# Patient Record
Sex: FEMALE | Race: WHITE | URBAN - METROPOLITAN AREA
[De-identification: names, ages, dates, MRNs, and addresses within clinical notes are randomized per-mention and may not be internally consistent; named-entity substitution may affect disease eponyms.]

---

## 2017-05-03 ENCOUNTER — TRANSFERRED RECORDS (OUTPATIENT)
Dept: HEALTH INFORMATION MANAGEMENT | Facility: CLINIC | Age: 72
End: 2017-05-03

## 2017-05-24 ENCOUNTER — HOSPITAL ENCOUNTER (OUTPATIENT)
Dept: BEHAVIORAL HEALTH | Facility: CLINIC | Age: 72
Discharge: HOME | End: 2017-05-24
Attending: PSYCHIATRY & NEUROLOGY | Admitting: PSYCHIATRY & NEUROLOGY
Payer: MEDICARE

## 2017-05-24 PROCEDURE — 90791 PSYCH DIAGNOSTIC EVALUATION: CPT

## 2017-05-24 ASSESSMENT — PAIN SCALES - GENERAL: PAINLEVEL: NO PAIN (0)

## 2017-05-24 NOTE — MR AVS SNAPSHOT
"                  MRN:[id number]                      After Visit Summary   2017    Katerina Crump    MRN: [id number]           Visit Information        Provider Department      2017  9:00 AM Tiffani Ayon LICSW [facility]        Your next 10 appointments already scheduled     May 24, 2017  9:00 AM CDT   Evaluation with SAHARA Terry   [facility] ([facility])    [address] [place] [id number]   [phone]              MyChart Information     [employer] lets you send messages to your doctor, view your test results, renew your prescriptions, schedule appointments and more. To sign up, go to [email or web address] . Click on \"Log in\" on the left side of the screen, which will take you to the Welcome page. Then click on \"Sign up Now\" on the right side of the page.     You will be asked to enter the access code listed below, as well as some personal information. Please follow the directions to create your username and password.     Your access code is: [email or web address]  Expires: 2017  8:20 AM     Your access code will  in 90 days. If you need help or a new code, please call your [place] clinic or [phone].        Care EveryWhere ID     This is your Care EveryWhere ID. This could be used by other organizations to access your [place] medical records  [id number]        "

## 2017-05-24 NOTE — MR AVS SNAPSHOT
Medication List       Patient:  CÉSAR STEEN   :  1945   Physician:  Rommel Coleman MD           This is your record.  Keep this with you and show to your community pharmacist(s) and physician(s) at each visit.     Allergies:  KEFLEX [CEPHALEXIN] - (reactions not documented)               Medications  Valid as of: May 24, 2017 -  8:20 AM    Generic Name Brand Name Tablet Size Instructions for use    Calcium OS- mg Ruby. Ca 500 MG Take 500 mg by mouth 2 times daily as needed         ClonazePAM klonoPIN 0.5 MG Take 0.5 tablets (0.25 mg) by mouth daily as needed for anxiety        Divalproex Sodium DEPAKOTE  MG Take 3 tablets (750 mg) by mouth At Bedtime        Gabapentin NEURONTIN 300 MG Take 2 capsules (600 mg) by mouth 2 times daily        Multiple Vitamin THERA-VIT  Take 1 tablet by mouth daily        OLANZapine zyPREXA 5 MG Take 1 tablet (5 mg) by mouth At Bedtime        .           .           .           .
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
## 2017-05-26 ENCOUNTER — HOSPITAL ENCOUNTER (OUTPATIENT)
Dept: BEHAVIORAL HEALTH | Facility: CLINIC | Age: 72
End: 2017-05-26
Attending: NURSE PRACTITIONER
Payer: MEDICARE

## 2017-05-26 PROBLEM — F31.9 BIPOLAR DISORDER (H): Status: ACTIVE | Noted: 2017-05-26

## 2017-05-26 PROCEDURE — H2012 BEHAV HLTH DAY TREAT, PER HR: HCPCS

## 2017-05-26 NOTE — PROGRESS NOTES
Group Therapy Progress Notes     Area of Treatment Focus:  Symptom Management, Personal Safety, Community Resources/Discharge Planning, Abstinence/Relapse Prevention, Develop / Improve Independent Living Skills and Develop Socialization / Interpersonal Relationship Skills    Therapeutic Interventions/Treatment Strategies:  Support, Feedback, Safety Assessments, Structured Activity, Problem Solving and Education    Response to Treatment Strategies:  Accepted Feedback, Gave Feedback, Listened, Focused on Goals, Attentive and Accepted Support    Name of Group:  Psychotherapy, Wellness    Progress Note  Psychotherapy:  Session 1: Today was Katerina's first day back at the 55+ program. She shared that she had been here before and it had been quite helpful. Now she finds herself immersed in anxiety again is struggling on daily basis. She fit in well with the group and was quick to begin sharing and participating. She is hopeful that this will again be helpful for her.  Session 2: The group addressed the theme of relapse. As several group members had been in the program before, it seemed to be important for all to explore what might be the signals or behavior changes to watch for that may indicate a potential relapse.  Wellness: The client actively participated in an exercise in sharing personal strengths. The clients followed the handout and directed questions at each other. The group indicated that they felt that this was helpful in getting to know each other and also recognize positive qualities in themselves.           Is this a Weekly Review of the Progress on the Treatment Plan?  Yes.      Are Treatment Plan Goals being addressed?  Yes, continue treatment goals      Are Treatment Plan Strategies to Address Goals Effective?  Yes, continue treatment strategies      Are there any current contracts in place?  Yes. safety

## 2017-05-26 NOTE — PROGRESS NOTES
Group Therapy Progress Notes     Area of Treatment Focus:  Symptom Management, Personal Safety, Community Resources/Discharge Planning, Abstinence/Relapse Prevention, Develop / Improve Independent Living Skills and Develop Socialization / Interpersonal Relationship Skills    Therapeutic Interventions/Treatment Strategies:  Support, Feedback, Safety Assessments, Structured Activity, Problem Solving and Education    Response to Treatment Strategies:  Accepted Feedback, Gave Feedback, Listened, Focused on Goals, Attentive and Accepted Support    Name of Group:  Mental Health Management.    Progress Note  Topic: Self Compassion for depression. Introduced topic, reviewed how to use skill, spent last 15 minutes practicing mindfulness with self compassion as focus. Katerina identified she can be very critical of herself especially when she is depressed and anxious. She engaged in session but was rather quiet, did not offer much feedback. Appeared anxious but engaged with peers.         Is this a Weekly Review of the Progress on the Treatment Plan?  Yes.      Are Treatment Plan Goals being addressed?  Yes, continue treatment goals      Are Treatment Plan Strategies to Address Goals Effective?  Yes, continue treatment strategies      Are there any current contracts in place?  Yes. safety

## 2017-05-30 ENCOUNTER — HOSPITAL ENCOUNTER (OUTPATIENT)
Dept: BEHAVIORAL HEALTH | Facility: CLINIC | Age: 72
End: 2017-05-30
Attending: NURSE PRACTITIONER
Payer: MEDICARE

## 2017-05-30 PROCEDURE — H2012 BEHAV HLTH DAY TREAT, PER HR: HCPCS

## 2017-05-30 PROCEDURE — 99213 OFFICE O/P EST LOW 20 MIN: CPT | Performed by: NURSE PRACTITIONER

## 2017-05-30 NOTE — PROGRESS NOTES
"RN Review of Medical History / Physical Health Screen  Outpatient Behavioral Programs      CLIENT'S NAME: Katerina Crump  MRN:   [id number]  :   1945 AGE:71 year old SEX: female    DATE OF DIAGNOSTIC ASSESSMENT: 17  DATE OF ADMISSION: 17   PROGRAM: 55+ Outpatient Program (55+)     Following admission, the RN reviewed the following:    - Medical History / Physical Health Screen completed during the DA noted above.  - Immediate Health Concerns as noted on the Initial Individual Treatment Plan.    Client height and weight recorded by RN in epic: yes    BMI Review:  Was the patient informed of BMI? yes      Findings No intervention     RN Recommendations include: Has had 8# wt loss over past two months due to \"heartburn\" she has appointment with PMD this week. Advised to follow up with primary care physician for any recommendations.  General nutrition education will be provided in group setting.    ALECIA ALVAREZ  2017    "

## 2017-05-30 NOTE — PROGRESS NOTES
"Group Therapy Progress Notes     Area of Treatment Focus:  Symptom Management and Develop / Improve Independent Living Skills    Therapeutic Interventions/Treatment Strategies:  Support, Feedback, Clarification and Education    Response to Treatment Strategies:  Accepted Feedback, Gave Feedback, Listened, Focused on Goals, Attentive, Accepted Support and Alert    Name of Group:  Psychotherapy     Progress Note  In Psychotherapy Group 1: Katerina reports that she is feeling anxious, is having difficulty making decisions, can't call people, is isolating, has negative thoughts about her life right now, needs a lot of reassurance from spouse and family, and has a hard time knowing what to do with her time. She is uncomfortable being alone. \"I don't feel like myself right now.\" She does walk and attends a support group for people with anxiety. She complains of \"shaking\" and thinks it may be a side effect of medication. She has been in touch with her medication provider and will see her on June 7. Katerina is seeing a therapist in the community but is wondering if she should change providers as she feels therapist is not supportive. Looked at ways to communicate with therapist about Katerina's needs and also ways to get referrals if Katerina decides to pursue a change.    In Psychotherapy Group 2: Katerina participated in a group discussing the theme from Group 1 of not feeling like one's self. Katerina says she loses her self confidence and self-esteem. She loses her sense of humor and ability to be around others. She has been volunteering but now will let that go for awhile. Looked at ways to communicate about depression with others. Katerina says that no one in her social [place], including spouse, understands depression. She feels relieved to be here in the group.            Is this a Weekly Review of the Progress on the Treatment Plan?  No     "

## 2017-05-31 NOTE — PROGRESS NOTES
"  Adult Mental Health Outpatient Group Therapy Progress Note     Katerina verbalizes the following treatment/discharge goals: \"To decrease the fear of each day. Increase my self confidence again. To be able to face the day. Decrease anxiety symptoms\".     Area of Treatment Focus:  Symptom Management and Develop Socialization / Interpersonal Relationship Skills    Therapeutic Interventions/Treatment Strategies:  Support, Feedback, Structured Activity and Education    Response to Treatment Strategies:  Accepted Feedback, Gave Feedback, Listened, Focused on Goals, Attentive, Accepted Support and Alert    Name of Group:  Mental health management     Description and Outcome:  Group checked in identifying sense of losing pieces of self with mental illness, notably for some their sense of humor.  Light music and beach balls were brought into group as a way of initiating sense of lightness and play.  Scarves were also introduced and \"played with\" to further explore lightness.  Group members identified increased energy and were able to acknowledge that they still maintained an ability to play, be creative and spontaneous.  Katerina was engaged in spontaneous movement development and used humor to interact with others.    Is this a Weekly Review of the Progress on the Treatment Plan?  No        "

## 2017-05-31 NOTE — H&P
" DATE OF SERVICE: 5/31/2017                                             ATTENDING PROVIDER: Celso GURROLA DNP  LEGAL STATUS:  Voluntary  SOURCES OF INFORMATION: Information was obtained from the patient and available records.  REASON FOR ASSESSMENT: Continuation of Senior Outpatient Program   HISTORY F PRESENT ILLNESS: Katerina Crump is a 71 year old female referred to the 55 Plus Program by herself. This is her second time in the program and she finds it quiet helpful. Mrs. Crump has a history of bipolar disorder, however she is disputing this diagnosis saying that she has never been manic or hypomanic, she is usually depressed and anxious. States she was first diagnosed with depression at age 16, after one of her friends past away. She has been hospitalized twice, once in the 70's and once in 2015 at [facility]. She has never attempted suicide and has not had ECT. Her PCP is Dr. Coleman and her psychiatrist is Dr. Oneal whom she saw in April and will see again July 3rd.   Current episode of depression and anxiety started in the spring of 2015 after a sergery to remove squamous cell carcinoma. She became very depressed, worried, unable to sleep, relax, eat or function in any way. She was contemplating suicide. She was hospitalized for 3 weeks at [facility] under Dr. Pereyra. She was discharged on regiment of Depakote, Klonopin Zyprexa and Vistaril. She was relatively stable until March of this year. She was scheduled to have an injection in March for osteoarthritis but does not feel that this has been stressful for her. She can't identify any stressors that may have contributed to her current mental states. Current symptoms include: negative thinking/image, high anxiety, poor sleep and appetite (lost 8 lbs this month), shakiness, racing thoughts, restlessness, fear of being alone, depression, and inability to function. She denies SI, SIB. She is avoiding her family and friends \"I don't want to be " "seen this way\". Her psychiatrist recently increased Depakote to a 1000 mg a day but she is not feeling much better.  Denies panic attacks, hallucinations, delusions, suicidal and homicidal ideation, self injuries behaviors, memory problems, obsessions, compulsions, specific fears, and manic symptoms. She is wondering if she needs to be hospitalised but states she hated the experience and does not want to go to the hospital.   SUBSTANCE USE HISTORY:   Denies.     PSYCHIATRIC HISTORY:   History of Bipolar disorder with psychosis for a brief period of time. She was hospitalized twice, the last one in 2015. No suicide attempts. No ECT Treatment.   PAST MEDICAL HISTORY:   Past Medical History:   Diagnosis Date     Arthritis     oesteoporosis and OA-hands     Cancer (H) 2015    skin     Depressive disorder     Bipolar     Past Surgical History:   Procedure Laterality Date     BIOPSY      , breast     COLONOSCOPY  age 66    OK     ENT SURGERY      tonsils removed at age 6     EYE SURGERY  2012    cataracts removed bilaterally     GYN SURGERY      removed pre-cancerous cells from uterous     ORTHOPEDIC SURGERY  2012    left wrist- plate put in       Denies seizures, head injuries, and loss of consciousness.  ALLERGIES:    Allergies   Allergen Reactions     Keflex [Cephalexin]      Nausea hard to eat     FAMILY HISTORY:  Family History   Problem Relation Age of Onset     Depression Mother      Anxiety Disorder Mother      Dementia Mother      Substance Abuse Father      quit at 46     Depression Sister      Substance Abuse Daughter      ?     MENTAL ILLNESS Daughter 19     ED- exercising too much     Depression Other      Suicide Other      Substance Abuse Brother      Depression Brother      Anxiety Disorder Sister      Depression Sister      Substance Abuse Daughter      ?     MENTAL ILLNESS Daughter 19     exercise and restricting     Dementia Maternal Aunt        SOCIAL HISTORY:         Early history: Born and rased in " [place], [place]. She is the 2 of 10 siblings. Two of her sisters have been diagnosed with depression. One nephew completed suicide.  Parents were  and now deciesed.  Father was and alcoholic.    Educational history: Some college    Marital history:    Children: 2 daughter and one son   Current living situation: With    Occupational history: clerical job    Current financial support: [employer]    history: none   Legal history: none   Abuse history:  none     ROS: Negative, except as noted in HPI.     MENTAL STATUS EXAM:      Appearance: well groomed, awake, alert, cooperative, severe distress and thin  Attitude:  cooperative  Eye Contact:  good  Mood:  anxious  Affect:  mood congruent  Speech:  clear, coherent and pressured speech  Psychomotor Behavior:  no evidence of tardive dyskinesia, dystonia, or tics  Throught Process:  logical and goal oriented  Associations:  no loose associations  Thought Content:  no evidence of suicidal ideation or homicidal ideation  Insight:  good  Judgement:  intact  Oriented to:  time, person, and place  Attention Span and Concentration:  intact  Recent and Remote Memory:  intact  Language: no problems expressing self   Fund of Knowledge: adequate for the level of education and training.    DIAGNOSIS:  1. Bipolar Effective Disorder  2. Generalized Anxiety Disorder, recurent, severe  CURRENT MEDICATIONS:   1. Gabapentin 600 mg TID  2. Depakote 1000 mg qhs  3. Melatonin 10 mg qhs  4. Zyprexa 2.5 mg qhs  5. Klonopin 0.25 mg qday, prn  PLAN AND RECOMMENDATIONS:  1. Continue Senior Outpatient Program.   2. Continue current medications. I recommend obtaining Depakote level and adjust dose accordingly. Check thyroid function; hyperthyroidism may cause high anxiety/agitation. Optimize Gabapentin. May use small doses 12.5-25 mg of Seroquel for anxiety and sleep. Klonopin may need to be increased. Seeing a therapist on weekly basis will be beneficial.   3. The  patient was encourage to follow up with PCP and Psychiatrist.   4. The patient was advised to let us know if inpatient admission or further help is needed.  5. The patient was advised to get involved in the community in order to continues to improve.   6. Care was coordinated with the treatment team.  Attestation: Patient has been seen and evaluated by hiren Matamoros DNP, RN, APRN, CNP.  5/31/2017  12:14 PM

## 2017-06-02 ENCOUNTER — HOSPITAL ENCOUNTER (OUTPATIENT)
Dept: BEHAVIORAL HEALTH | Facility: CLINIC | Age: 72
End: 2017-06-02
Attending: NURSE PRACTITIONER
Payer: MEDICARE

## 2017-06-02 PROCEDURE — H2012 BEHAV HLTH DAY TREAT, PER HR: HCPCS

## 2017-06-02 NOTE — PROGRESS NOTES
"  Adult Mental Health Outpatient Group Therapy Progress Note     Client Initial Individualized Goals for Treatment:Katerina verbalizes the following treatment/discharge goals: \"To decrease the fear of each day. Increase my self confidence again. To be able to face the day. Decrease anxiety symptoms\".      See Initial Treatment suggestions for the client during the time between Diagnostic Assessment and completion of the Master Individualized Treatment Plan.    Treatment Goals:     see above       Area of Treatment Focus:  Symptom Management and Develop Socialization / Interpersonal Relationship Skills    Therapeutic Interventions/Treatment Strategies:  Support, Feedback, Structured Activity, Clarification and Education    Response to Treatment Strategies:  Accepted Feedback, Gave Feedback, Listened, Focused on Goals, Attentive and Alert    Name of Group:  Mental health management     Description and Outcome:  Ongoing presentation on Radical Acceptance.  Review of past discussion of description of topic and focus today on barriers to radical acceptance.  Group members asked for clarification on what is meant by \"reality.\"  Identified negative self talk and distorted thinking (often magnified with depression) as a barrier for understanding the reality in some situations.  Stressed importance of reality checking either through feedback from supports and/or practising mindfulness. Katerina asked clarifying questions.  Had insight into need to accept anxiety as part of her reality as a way to better manage current symptoms.    Is this a Weekly Review of the Progress on the Treatment Plan?  No        "

## 2017-06-02 NOTE — PROGRESS NOTES
Group Therapy Progress Notes     Area of Treatment Focus:  Symptom Management, Personal Safety, Community Resources/Discharge Planning, Abstinence/Relapse Prevention, Develop / Improve Independent Living Skills and Develop Socialization / Interpersonal Relationship Skills    Therapeutic Interventions/Treatment Strategies:  Support, Feedback, Safety Assessments, Structured Activity, Problem Solving and Education    Response to Treatment Strategies:  Accepted Feedback, Gave Feedback, Listened, Focused on Goals, Attentive and Accepted Support    Name of Group:  Mental Health Management. Wellness    Progress Note   Topic:Wellness and Mental Health  Progress Note  .Introduced topic of Sympathetic Lindsey as tool for coping with depression. Group read an article together, stopping for reflection and soliciting examples of ways each member has experienced sympathetic lindsey. Katerina verbalized understanding. She shared that it has been hard for her to be happy for her friend who emailed [place] and enjoying the peace and quiet. Katerina endorsed feeling envy- good discussion about this.        Is this a Weekly Review of the Progress on the Treatment Plan?  Yes.      Are Treatment Plan Goals being addressed?  Yes, continue treatment goals      Are Treatment Plan Strategies to Address Goals Effective?  Yes, continue treatment strategies      Are there any current contracts in place?  Yes. Safety- no suicidal ideation endorsed.

## 2017-06-02 NOTE — PROGRESS NOTES
"Group Therapy Progress Notes     Area of Treatment Focus:  Symptom Management and Develop / Improve Independent Living Skills    Therapeutic Interventions/Treatment Strategies:  Support, Feedback, Problem Solving, Clarification and Education    Response to Treatment Strategies:  Accepted Feedback, Gave Feedback, Listened, Focused on Goals, Attentive, Accepted Support and Alert    Name of Group:  Psychotherapy     Progress Note  Session 1: Katerina reports that she is shaking every morning when she wakes up. The shaking lasts about 15 minutes and she feels frightened by it, worrying that this might become a permanent experience in her life. She has spoken with her medical doctor about it and will see her psychiatrist this coming week. Katerina says it has been going on for about two months. Katerina also reports continuing to feel very anxious much of the day. She tries to stay busy and becomes uncomfortable with not having something to do. \"I don't want to just sit there and feel anxious.\" Meditation is something that helps temporarily. She also walks and pushes herself to get out. She had dinner last night with friends and felt some relief. Concentration is difficult. Katerina says she feels abandoned by God and feels angry about having this condition even though she knows that others have problems too. She feels guilty about not being able to meet her roles and obligations, like caring for her grandchildren or house.    Session 2: Katerina participated in a discussion of working with feelings of guilt which was a theme in the first session. Id'd unreasonable guilt as a possible symptom during a depressive episode. Looked at ways to cope with it more effectively by taking action on small goals, increasing tolerance and self acceptance, and countering the thoughts associated with guilty feelings with more realistic expectations.        Is this a Weekly Review of the Progress on the Treatment Plan?  Yes. Continue treatment plan. " Behavioral safety plan in place.

## 2017-06-06 ENCOUNTER — HOSPITAL ENCOUNTER (OUTPATIENT)
Dept: BEHAVIORAL HEALTH | Facility: CLINIC | Age: 72
End: 2017-06-06
Attending: NURSE PRACTITIONER
Payer: MEDICARE

## 2017-06-06 PROCEDURE — H2012 BEHAV HLTH DAY TREAT, PER HR: HCPCS

## 2017-06-06 NOTE — PROGRESS NOTES
Group Therapy Progress Notes     Area of Treatment Focus:  Symptom Management, Personal Safety, Community Resources/Discharge Planning, Abstinence/Relapse Prevention, Develop / Improve Independent Living Skills and Develop Socialization / Interpersonal Relationship Skills    Therapeutic Interventions/Treatment Strategies:  Support, Safety Assessments, Structured Activity and Education    Response to Treatment Strategies:  Accepted Feedback, Listened, Focused on Goals, Attentive and Accepted Support    Name of Group:  Psychotherapy Group 1, Psychotherapy Group 2 and Mental Health Management    Progress Note  In Psychotherapy Group 1, Katerina was able to self disclose in group her mood symptoms as well as her mental health history. She reports anxious mood, worry about her future, difficulty making decisions and restlessness. Denies S/I or safety issues. She was able to drive herself to the program today. She reports feeling uncomfortable due to shakiness.  She was not able to watch her grandchildren over the weekend due to her mood symptoms. She asked her  to go shopping with her and he said no. She does not want to be left alone due to fear. She has an appointment with Dr. Hendrix tomorrow for medication management. We discussed grounding strategies, such as, YOGA, meditation which she has tried in the past. She enjoys reading mysteries.     In Psychotherapy Group 2, coping skills for symptom management were explored including mindfulness and realistic structure.  Katerina was able to identify who she could connect with for social support.    In Mental Health Management, Katerina was able to identify his strengths and create a personal affirmation statement to cultivate self esteem and use as a cognitive behavioral tool.She was able to process with a peer in group.           Is this a Weekly Review of the Progress on the Treatment Plan?    No

## 2017-06-06 NOTE — PROGRESS NOTES
"  Adult Mental Health Outpatient Group Therapy Progress Note     Client Initial Individualized Goals for Treatment:Katerina verbalizes the following treatment/discharge goals: \"To decrease the fear of each day. Increase my self confidence again. To be able to face the day. Decrease anxiety symptoms\".      See Initial Treatment suggestions for the client during the time between Diagnostic Assessment and completion of the Master Individualized Treatment Plan.    Treatment Goals:     see above       Area of Treatment Focus:  Symptom Management and Develop Socialization / Interpersonal Relationship Skills    Therapeutic Interventions/Treatment Strategies:  Support, Feedback, Structured Activity and Education    Response to Treatment Strategies:  Accepted Feedback, Gave Feedback, Listened, Focused on Goals, Attentive, Accepted Support and Alert    Name of Group:  Mental health management     Description and Outcome:  Group began with warmup utilizing music and rhythm to activate.  Information given regarding \"authentic movement\", moving from the inside out.  Group members practised and explored expansion and contraction initiating from the inside.  Discussion developed around attention and focus required to move with intention and how this contrasts with decreased sense of self control with depression.  Katerina was engaged.  She shared that she has meditation CDs but has not listened.  She asked for feedback regarding how to listen to them and was encouraged to listen with intent of being a participant in the relaxation.    Is this a Weekly Review of the Progress on the Treatment Plan?  No        "

## 2017-06-09 ENCOUNTER — HOSPITAL ENCOUNTER (OUTPATIENT)
Dept: BEHAVIORAL HEALTH | Facility: CLINIC | Age: 72
End: 2017-06-09
Attending: NURSE PRACTITIONER
Payer: MEDICARE

## 2017-06-09 PROCEDURE — H2012 BEHAV HLTH DAY TREAT, PER HR: HCPCS

## 2017-06-09 NOTE — PROGRESS NOTES
Group Therapy Progress Notes     Area of Treatment Focus:  Symptom Management, Personal Safety, Community Resources/Discharge Planning, Abstinence/Relapse Prevention, Develop / Improve Independent Living Skills and Develop Socialization / Interpersonal Relationship Skills    Therapeutic Interventions/Treatment Strategies:  Support, Feedback, Safety Assessments, Structured Activity, Problem Solving and Education    Response to Treatment Strategies:  Accepted Feedback, Gave Feedback, Listened, Focused on Goals, Attentive and Accepted Support    Name of Group:  Mental Health Management. Wellness    Progress Note   Topic:Wellness and Mental Health  Progress Note  Read poarmaan Guerrero and reviewed questions the poem addressed. Katerina verbalized that despite severe anxiety and depression she is keeping herself with structure at home, she is eating, exercising and cooking meals. Group impressed with how how she is working and gave her that feedback which she seemed able to take in.    Is this a Weekly Review of the Progress on the Treatment Plan?  Yes.      Are Treatment Plan Goals being addressed?  Yes, continue treatment goals      Are Treatment Plan Strategies to Address Goals Effective?  Yes, continue treatment strategies      Are there any current contracts in place?  Yes. Safety- no suicidal ideation endorsed.

## 2017-06-09 NOTE — PROGRESS NOTES
Group Therapy Progress Notes     Area of Treatment Focus:  Symptom Management, Personal Safety, Community Resources/Discharge Planning, Abstinence/Relapse Prevention, Develop / Improve Independent Living Skills and Develop Socialization / Interpersonal Relationship Skills    Therapeutic Interventions/Treatment Strategies:  Support, Safety Assessments, Structured Activity and Education    Response to Treatment Strategies:  Accepted Feedback, Listened, Focused on Goals, Attentive and Accepted Support    Name of Group:  Psychotherapy Group 1, Psychotherapy Group 2 and Coping with Depression      Progress Note  In Psychotherapy Group 1, Katerina reports less shakiness and anxious mood. She continues to reports rumination and worry. Denies S/I or safety issues. She had a medication consultation with her psychiatrist and medication changes were made. She could not remember her medication and will bring in an updated medication list on Tuesday. She was able to spend time with each daughter on different days. She asserted her need for her  to go to the grocery store and complete errands. She was able to drive today. She has ideas to structure her time over the next few days. Her sister will be coming down from [place] to visit. She was able to disclose in group her mental health history.  The group discussed the impact of aging on functioning level and mood. Group members provided compassion, empathy and understanding. She is finding the structure of the program helpful.     In Psychotherapy Group 2, the group processed coping with symptom elevation in the recovery process. Anxiety Management tools were explored.     In Coping with Depression Group, Guided Imagery was discussed and practiced as a symptom management tool.  Highlights of her week were shared with group members.      Is this a Weekly Review of the Progress on the Treatment Plan?    No

## 2017-06-13 ENCOUNTER — HOSPITAL ENCOUNTER (OUTPATIENT)
Dept: BEHAVIORAL HEALTH | Facility: CLINIC | Age: 72
End: 2017-06-13
Attending: NURSE PRACTITIONER
Payer: MEDICARE

## 2017-06-13 PROCEDURE — H2012 BEHAV HLTH DAY TREAT, PER HR: HCPCS

## 2017-06-13 NOTE — PROGRESS NOTES
Group Therapy Progress Notes     Area of Treatment Focus:  Symptom Management, Personal Safety, Community Resources/Discharge Planning, Abstinence/Relapse Prevention, Develop / Improve Independent Living Skills and Develop Socialization / Interpersonal Relationship Skills    Therapeutic Interventions/Treatment Strategies:  Support, Safety Assessments, Structured Activity and Education    Response to Treatment Strategies:  Accepted Feedback, Listened, Focused on Goals, Attentive and Accepted Support    Name of Group:  Psychotherapy 1, Psychotherapy 2 and Coping with Depression    Progress Note  In Psychotherapy Group 1, Katerina  Reports depressed mood with anxiety. She continues to have shakiness, worry, rumination, fear of being alone, and catastrophizing. She also has fecal incontinence which is creating more anxiety as well as concerns about planning. She has a walk planned with her daughter on Thursday. We explored problem solving ideas. Writer discussed the value of mindfulness as an anxiety management skill. The group identified ways to cope with loss of functioning including both mental health symptoms and co-morbid physical health issues.       In Psychotherapy Group 2, the group processed the importance of realistic, balanced structure as a symptom management tool. Katerina has ideas to structure her time till next day at [facility].    In Coping with Depression, writer and Victor MMohit processed how to move from the emotional space of despair to hope along with helpful spiritual tools. Coping with loss, such as functioning level  was processed with peers in group.       Is this a Weekly Review of the Progress on the Treatment Plan?  No

## 2017-06-13 NOTE — PROGRESS NOTES
"  Adult Mental Health Outpatient Group Therapy Progress Note     Client Initial Individualized Goals for Treatment:Katerina verbalizes the following treatment/discharge goals: \"To decrease the fear of each day. Increase my self confidence again. To be able to face the day. Decrease anxiety symptoms\".      See Initial Treatment suggestions for the client during the time between Diagnostic Assessment and completion of the Master Individualized Treatment Plan.    Treatment Goals:     see above     Area of Treatment Focus:  Symptom Management and Develop Socialization / Interpersonal Relationship Skills    Therapeutic Interventions/Treatment Strategies:  Support, Feedback, Structured Activity, Clarification and Education    Response to Treatment Strategies:  Accepted Feedback, Gave Feedback, Listened, Focused on Goals, Attentive, Accepted Support and Alert    Name of Group:  Mental health management     Description and Outcome:  Group members identified desire to both relax and have fun in this group.  Brief discussion around purpose of group, acknowledging importance of \"opening up\".  Explored postures related to \"being as one should be\" or \"proper\" and \"yielding\".  The group noted that when one was wearing \"proper\" posture breathing was negatively affected.  Breathing became easier when yielding or accepting.  The group also took turns leading movements and asking each other what they needed. Katerina was assertive in her request for fun today.  Shared that she feels her depression has led to a loss in this area.  Shared that she appreciated being able to have fun today.    Is this a Weekly Review of the Progress on the Treatment Plan?  No        "

## 2017-06-16 ENCOUNTER — HOSPITAL ENCOUNTER (OUTPATIENT)
Dept: BEHAVIORAL HEALTH | Facility: CLINIC | Age: 72
End: 2017-06-16
Attending: NURSE PRACTITIONER
Payer: MEDICARE

## 2017-06-16 PROCEDURE — H2012 BEHAV HLTH DAY TREAT, PER HR: HCPCS

## 2017-06-16 NOTE — PROGRESS NOTES
Group Therapy Progress Notes     Area of Treatment Focus:  Symptom Management, Personal Safety, Community Resources/Discharge Planning, Abstinence/Relapse Prevention, Develop / Improve Independent Living Skills and Develop Socialization / Interpersonal Relationship Skills    Therapeutic Interventions/Treatment Strategies:  Support, Safety Assessments, Structured Activity and Education    Response to Treatment Strategies:  Accepted Feedback, Listened, Focused on Goals, Attentive and Accepted Support    Name of Group: Psychotherapy Group 1 and Psychotherapy Group 2    Progress Note  In Psychotherapy Group 1, Katerina reports anxious mood. Denies S/I or safety issues. She was able to go to her PCP who prescribed Citrucel which was helpful. She had an appointment with her therapist. On Wednesday,  she met with a friend for lunch. At night, she only had two hours of sleep due to worry and rumination about her concerns of fecal incontinence. She managed to go on a 4 mile walk with her daughter without incidence.  She reports feeling more hopeful.  Katerina reports difficulty making decisions. She will spend time with a friend over the weekend cutting out fabric squares for a quilt. She and her  will spend Fathers Day together since her children are out of town. They will get together on Monday night.  In this group, themes included the physiology of anxiety and helpful coping skills including breathing and mindfulness.     In Psychotherapy Group 2, the group explored coping with uncertainty and the benefits and importance of self care.      Is this a Weekly Review of the Progress on the Treatment Plan?    No

## 2017-06-16 NOTE — PROGRESS NOTES
"Adult Mental Health Outpatient Group Therapy Progress Note     Client Initial Individualized Goals for Treatment: \"To decrease the fear of each day. Increase my self confidence again. To be able to face the day. Decrease anxiety symptoms\".    See Initial Treatment suggestions for the client during the time between Diagnostic Assessment and completion of the Master Individualized Treatment Plan.    Treatment Goals:    See above     Area of Treatment Focus:  Symptom Management    Therapeutic Interventions/Treatment Strategies:  Support, Feedback, Safety Assessments, Structured Activity and Education    Response to Treatment Strategies:  Accepted Feedback, Listened, Attentive, Accepted Support and Alert    Name of Group:  Coping with Depression and Anxiety     Description and Outcome:  Client presented as alert and mildly anxious..Good focus and concentration during a discussion related to depression management with a focus on crating a vision statement for health and wellness.  Client would benefit from additional opportunities to practice and implement content from this session in her daily mental health recovery.      Is this a Weekly Review of the Progress on the Treatment Plan?  Yes.      Are Treatment Plan Goals being addressed?  Yes, continue treatment goals      Are Treatment Plan Strategies to Address Goals Effective?  Yes, continue treatment strategies      Are there any current contracts in place?  No      "

## 2017-06-16 NOTE — PROGRESS NOTES
"  Adult Mental Health Outpatient Group Therapy Progress Note     Client Initial Individualized Goals for Treatment:Katerina verbalizes the following treatment/discharge goals: \"To decrease the fear of each day. Increase my self confidence again. To be able to face the day. Decrease anxiety symptoms\".      See Initial Treatment suggestions for the client during the time between Diagnostic Assessment and completion of the Master Individualized Treatment Plan.    Treatment Goals:     see above     Area of Treatment Focus:  Symptom Management and Develop Socialization / Interpersonal Relationship Skills    Therapeutic Interventions/Treatment Strategies:  Support, Feedback, Structured Activity, Clarification and Education    Response to Treatment Strategies:  Accepted Feedback, Gave Feedback, Listened, Focused on Goals, Attentive, Accepted Support and Alert    Name of Group:  Mental health management     Description and Outcome:  Education provided on radical acceptance.  Radical acceptance was offered as a skill to tolerate distress.  Discussion around the difficulties in radical acceptance, including fears around what is needed to accept as well as not having the necessary skills.  Group members identified needs to accept realities of aging, mental illness, loss, aging parents and need to move.  Group members were encouraged to practise reality acceptance on areas that are not as significant as above mentioned.  Will continue with this discussion, focusing on skills and practise.  Katerina was engaged.  She shared her fear of allowing herself to accept depression.  She accepted feedback and acknowledged understanding of topic.    Is this a Weekly Review of the Progress on the Treatment Plan?  No        "

## 2017-06-20 ENCOUNTER — HOSPITAL ENCOUNTER (OUTPATIENT)
Dept: BEHAVIORAL HEALTH | Facility: CLINIC | Age: 72
End: 2017-06-20
Attending: NURSE PRACTITIONER
Payer: MEDICARE

## 2017-06-20 PROCEDURE — H2012 BEHAV HLTH DAY TREAT, PER HR: HCPCS

## 2017-06-20 NOTE — PROGRESS NOTES
Group Therapy Progress Notes     Area of Treatment Focus:  Symptom Management, Personal Safety, Community Resources/Discharge Planning, Abstinence/Relapse Prevention, Develop / Improve Independent Living Skills and Develop Socialization / Interpersonal Relationship Skills    Therapeutic Interventions/Treatment Strategies:  Support, Safety Assessments, Structured Activity and Education    Response to Treatment Strategies:  Accepted Feedback, Listened, Focused on Goals, Attentive and Accepted Support    Name of Group: Psychotherapy Group 1, Psychotherapy Group 2 and Coping with Depression    Progress Note  In Psychotherapy Group 1, Katerina reports anxious mood, worry and rumination. Denies S/I or safety issues. She reports she doesn't like to be alone at home. She spent time with her  over the weekend. Her family came over yesterday and brought dinner. She was able to make banana bread. Katerina and the group explored tools to manage anxiety including radical acceptance, reconnecting with social supports and engaging intentional activities that create calm and ease in the body. She plans on going for a walk on Wednesday with her daughter. Katerina has a psychologist appointment on Thursday. She will continue in the 55+ program.    In Psychotherapy Group 2, Katerina and the group explored the cycle of symptom relapse and the importance of lifestyle balance as well as self care.     In Coping with Depression, Katerina  was  able to make a map of her relationships and identify interpersonal relationship dynamics that may contributing to her mood symptoms, stressors and who she would like to enroll in her recovery. She reports some difficulty completing this exercise. She was able to process these themes with peers in group.     Is this a Weekly Review of the Progress on the Treatment Plan?    No

## 2017-06-21 NOTE — PROGRESS NOTES
"  Adult Mental Health Outpatient Group Therapy Progress Note     Client Initial Individualized Goals for Treatment:Katerina verbalizes the following treatment/discharge goals: \"To decrease the fear of each day. Increase my self confidence again. To be able to face the day. Decrease anxiety symptoms\".      See Initial Treatment suggestions for the client during the time between Diagnostic Assessment and completion of the Master Individualized Treatment Plan.    Treatment Goals:     see above       Area of Treatment Focus:  Symptom Management and Develop Socialization / Interpersonal Relationship Skills    Therapeutic Interventions/Treatment Strategies:  Support, Feedback, Structured Activity, Clarification and Education    Response to Treatment Strategies:  Accepted Feedback, Gave Feedback, Listened, Focused on Goals, Attentive, Accepted Support and Alert    Name of Group:  Mental health management     Description and Outcome:  Group began with structured breathing focusing on self awareness and practise in \"attending\" to oneself.  The warmup continued with stretching, developing into a theme of \"letting go.\"  Group members identified needs to let go of anxiety and of low self esteem.  The explored a variety of movement options for expression of \"letting go.\"  Many of these included strength and quickness. This was followed by intentional breathing, with discussion about using breath to intentionally accept pieces that we cannot change by force or will.  Katerina was engaged.  She identified wanting to release \"anxiety.\"  She accepted feedback about her ability to be spontaneous and playful in this group today.    Is this a Weekly Review of the Progress on the Treatment Plan?  No        "

## 2017-06-23 ENCOUNTER — HOSPITAL ENCOUNTER (OUTPATIENT)
Dept: BEHAVIORAL HEALTH | Facility: CLINIC | Age: 72
End: 2017-06-23
Attending: NURSE PRACTITIONER
Payer: MEDICARE

## 2017-06-23 PROCEDURE — H2012 BEHAV HLTH DAY TREAT, PER HR: HCPCS

## 2017-06-23 NOTE — PROGRESS NOTES
Group Therapy Progress Notes     Area of Treatment Focus:  Symptom Management, Personal Safety, Community Resources/Discharge Planning, Abstinence/Relapse Prevention, Develop / Improve Independent Living Skills and Develop Socialization / Interpersonal Relationship Skills    Therapeutic Interventions/Treatment Strategies:  Support, Safety Assessments, Structured Activity and Education    Response to Treatment Strategies:  Accepted Feedback, Listened, Focused on Goals, Attentive and Accepted Support    Name of Group:  Psychotherapy Group 1, Psychotherapy Group 2 and Coping with Depression    Progress Note  In Psychotherapy Group 1, Katerina continues to report anxious mood, worry and rumination. Denies S/I or safety issues.  She saw her psychologist yesterday and was feeling overwhelmed by the feedback during the session. She processed what might be helpful in managing her anxiety symptoms. Validation and reassurance were offered. She will have a psychiatrist appointment on Monday and she will most likely be prescribed a new medication for the shakiness. She had been cooking more at home. She made banana bread and tuna hot dish. She also has been reaching out to friends. She continues her walks. She attended her [facility] group on Tuesday. Katerina and the group discussed the benefits of reassurance from support people  when mood symptoms elevate and how to have balance  productivity level when beginning to feel better to decrease the risk of relapse.     In Psychotherapy Group 2, Katerina and the group discussed how co-morbid physical health issues impact your mental health. Katerina continues to have gastrointestinal issues. The group discussed coping with lapses in mood symptoms as well as asserting needs.    In Coping with Depression, Katerina and the group explored the physiology of anxiety and helpful coping strategies including Guided Imagery. The members of group practiced a stress management guided imagery exercise.     Is this  a Weekly Review of the Progress on the Treatment Plan?    No

## 2017-06-23 NOTE — PROGRESS NOTES
"  Adult Mental Health Outpatient Group Therapy Progress Note     Client Initial Individualized Goals for Treatment:Katerina verbalizes the following treatment/discharge goals: \"To decrease the fear of each day. Increase my self confidence again. To be able to face the day. Decrease anxiety symptoms\".      See Initial Treatment suggestions for the client during the time between Diagnostic Assessment and completion of the Master Individualized Treatment Plan.    Treatment Goals:     see above     Area of Treatment Focus:  Symptom Management and Develop Socialization / Interpersonal Relationship Skills    Therapeutic Interventions/Treatment Strategies:  Support, Feedback, Structured Activity, Clarification and Education    Response to Treatment Strategies:  Accepted Feedback, Gave Feedback, Listened, Focused on Goals, Attentive, Accepted Support and Alert    Name of Group:  Mental health management     Description and Outcome:  Topic- Self Soothing using the Five Senses: Provided information about self care and self soothing, provided handout with some warm up ideas then asked each client to identify 1 activity from each category they could practice over the weekend. Client verbalized understanding of session content by fulling participating and offering ideas to group. Katerina has rather static stare, ruminative, flat affect, endorsed anxiety but still engaged in this activity able to identify all senses she could use and has been using.    Is this a Weekly Review of the Progress on the Treatment Plan?  No      "

## 2017-06-27 ENCOUNTER — HOSPITAL ENCOUNTER (OUTPATIENT)
Dept: BEHAVIORAL HEALTH | Facility: CLINIC | Age: 72
End: 2017-06-27
Attending: NURSE PRACTITIONER
Payer: MEDICARE

## 2017-06-27 PROCEDURE — H2012 BEHAV HLTH DAY TREAT, PER HR: HCPCS

## 2017-06-27 NOTE — PROGRESS NOTES
Group Therapy Progress Notes     Area of Treatment Focus:  Symptom Management, Personal Safety, Community Resources/Discharge Planning, Abstinence/Relapse Prevention, Develop / Improve Independent Living Skills and Develop Socialization / Interpersonal Relationship Skills    Therapeutic Interventions/Treatment Strategies:  Support, Safety Assessments, Structured Activity and Education    Response to Treatment Strategies:  Accepted Feedback, Listened, Focused on Goals, Attentive and Accepted Support    Name of Group:  Psychotherapy Group 1 and Psychotherapy Group 2    Progress Note  In Psychotherapy Group 1, Katerina reports anxious mood, worry and ruminating thoughts. She did meet with her psychiatrist who recommended a small dose of Xanax in the afternoon. Katerina became aware of cognitive distortions of comparing and catastrophizing. We discussed mindfulness and helpful CBT tools. Katerina was able to go on two outings with friends for two hours each. She went shopping at [employer] for a birthday present for her granddaughter. Yesterday she could only stay for ten minutes at her daughter's house. She reports having rumination about her sleep. She has a sore on her ear and was concerned she may not be able to go to sleep. She was going to meet with her MD.  She also was going to meet with a therapist on July 7 that is in her health insurance network.  She has seen this therapist one time before and could not see her weekly. Katerina and the and peers in group explored the importance of lifestyle balance in recovery as well as the benefits of  acceptance when mood symptoms persist. Katerina will continue in the 55+ program.    In Psychotherapy Group 2, Katerina and the group explored activities of self care and explored the emotions that go along with lapses in recovery.           Is this a Weekly Review of the Progress on the Treatment Plan?    No

## 2017-06-27 NOTE — ADDENDUM NOTE
Encounter addended by: Dylon Rahman on: 6/27/2017  9:23 AM<BR>     Actions taken: Visit Navigator Flowsheet section accepted

## 2017-06-28 ASSESSMENT — PATIENT HEALTH QUESTIONNAIRE - PHQ9: SUM OF ALL RESPONSES TO PHQ QUESTIONS 1-9: 15

## 2017-06-28 NOTE — PROGRESS NOTES
"  Adult Mental Health Outpatient Group Therapy Progress Note     Client Initial Individualized Goals for Treatment:Katerina verbalizes the following treatment/discharge goals: \"To decrease the fear of each day. Increase my self confidence again. To be able to face the day. Decrease anxiety symptoms\".      See Initial Treatment suggestions for the client during the time between Diagnostic Assessment and completion of the Master Individualized Treatment Plan.    Treatment Goals:     see above       Area of Treatment Focus:  Symptom Management and Develop Socialization / Interpersonal Relationship Skills    Therapeutic Interventions/Treatment Strategies:  Support, Feedback, Structured Activity and Clarification    Response to Treatment Strategies:  Accepted Feedback, Gave Feedback, Listened, Focused on Goals, Attentive, Accepted Support and Alert    Name of Group:  Mental health management     Description and Outcome:  Group began with focus on breathe and idea of \"yielding.\"  Yield was described as an intentional use of chair for support and was contrasted with idea of collapsing into the chair.  A movement theme developed from the stretch which was described as \"arriving.\" This theme was further developed to include attention to arriving in one own's timing and to make contact with one another when arrived.  The group also explored taking steps forward once arrived.  Katerina was engaged and assertive.  Initiated strength in her movement qualities.    Is this a Weekly Review of the Progress on the Treatment Plan?  No        "

## 2017-06-30 ENCOUNTER — HOSPITAL ENCOUNTER (OUTPATIENT)
Dept: BEHAVIORAL HEALTH | Facility: CLINIC | Age: 72
End: 2017-06-30
Attending: NURSE PRACTITIONER
Payer: MEDICARE

## 2017-06-30 PROCEDURE — H2012 BEHAV HLTH DAY TREAT, PER HR: HCPCS

## 2017-06-30 NOTE — PROGRESS NOTES
"  Adult Mental Health Outpatient Group Therapy Progress Note     Client Initial Individualized Goals for Treatment:Katerina verbalizes the following treatment/discharge goals: \"To decrease the fear of each day. Increase my self confidence again. To be able to face the day. Decrease anxiety symptoms\".      See Initial Treatment suggestions for the client during the time between Diagnostic Assessment and completion of the Master Individualized Treatment Plan.    Treatment Goals:  . Will plan and problem-solve to return to previous socia, enjoyable activities and decrease lonliness and isolation  Will plan and problem-solve to decrease fears and anxiety and increase self confidence  Client will notify staff when needing assistance to develop or implement a coping plan to manage suicidal or self injurious urges.     Area of Treatment Focus:  Symptom Management and Develop Socialization / Interpersonal Relationship Skills    Therapeutic Interventions/Treatment Strategies:  Support, Feedback, Structured Activity and Education    Response to Treatment Strategies:  Accepted Feedback, Gave Feedback, Listened, Focused on Goals, Attentive, Accepted Support and Alert    Name of Group:  Mental health management     Description and Outcome:  Education provided on radical acceptance, as a way to improve tolerance of distressful events.  A brief review of definition of radical acceptance and areas needing to be accepted was given.  Focus today on specific ways of practising the skill of radical acceptance.  Areas needed to be acceptance include aging, losses, illness, and family contact.  Opposite action, observing our thoughts and pros/cons of acceptance were examples of ways to practise.  Katerina shared that she struggles with acceptance of anxiety.  Identified the pros of acceptance vs. The cons of denying reality of illness.    Is this a Weekly Review of the Progress on the Treatment Plan?  No        "

## 2017-06-30 NOTE — PROGRESS NOTES
"  Adult Mental Health Outpatient Group Therapy Progress Note     Client Initial Individualized Goals for Treatment:Katerina verbalizes the following treatment/discharge goals: \"To decrease the fear of each day. Increase my self confidence again. To be able to face the day. Decrease anxiety symptoms\".      See Initial Treatment suggestions for the client during the time between Diagnostic Assessment and completion of the Master Individualized Treatment Plan.    Treatment Goals:     see above     Area of Treatment Focus:  Symptom Management and Develop Socialization / Interpersonal Relationship Skills    Therapeutic Interventions/Treatment Strategies:  Support, Feedback, Structured Activity, Clarification and Education    Response to Treatment Strategies:  Accepted Feedback, Gave Feedback, Listened, Focused on Goals, Attentive, Accepted Support and Alert    Name of Group:  Mental health management Wellness    Description and Outcome:    Viewed VIRIDIANA talk on \" The Happiness Project\". Katerina endorsed feeling very sedated and fell asleep. She reports the xanax is making her sleeping and was to take another dose at 2PM but planned to hold off as she needed to drive home and will talk to her doctor on Monday. She offered that she tries to keep place on the couch where she sits decluttered and a place of peace and respite which can improve energy and happiness.        Is this a Weekly Review of the Progress on the Treatment Plan?  No    "

## 2017-06-30 NOTE — PROGRESS NOTES
Group Therapy Progress Notes     Area of Treatment Focus:  Symptom Management, Personal Safety, Community Resources/Discharge Planning, Abstinence/Relapse Prevention, Develop / Improve Independent Living Skills and Develop Socialization / Interpersonal Relationship Skills    Therapeutic Interventions/Treatment Strategies:  Support, Safety Assessments, Structured Activity and Education    Response to Treatment Strategies:  Accepted Feedback, Listened, Focused on Goals, Attentive and Accepted Support    Name of Group:  Psychotherapy Group 1 and Psychotherapy Group 2    Progress Note  In Psychotherapy Group 1, Katerina and group members explored the benefits of asserting needs and the cycle of acceptance with accepting support in health recovery.     In Psychotherapy Group 2, Katerina reports depressed mood with anxiety, worry, rumination, negative self talk and continues to lose weight. She feels helpless. Denies S/I or safety issues. She does drink Ensure.  Katerina is aware that her anxiety elevates with her rumination and negative thought process. She is aware of cognitive distortions including catastrophizing. We explored cognitive reframing skills and mindfulness tools including a here and now focus. She went for a walk with her daughter and spent time with her grandchildren yesterday which she found enjoyable. Katerina is concerned about taking the Xanax in the afternoon she was getting too sleepy to drive. Writer encouraged her to call her psychiatrist office which she agreed.  She did have her outpatient therapy appointment yesterday and found it helpful. The group explored the relationship between co-morbid health issues and mental health symptoms.  Katerina has ideas for activities to structure her time. She hopes to find a book on mindfulness at the library.  Katerina will continue in the 55+ program.        Is this a Weekly Review of the Progress on the Treatment Plan?    No

## 2017-07-07 ENCOUNTER — HOSPITAL ENCOUNTER (OUTPATIENT)
Dept: BEHAVIORAL HEALTH | Facility: CLINIC | Age: 72
End: 2017-07-07
Attending: NURSE PRACTITIONER
Payer: MEDICARE

## 2017-07-07 PROCEDURE — H2012 BEHAV HLTH DAY TREAT, PER HR: HCPCS

## 2017-07-07 NOTE — PROGRESS NOTES
Group Therapy Progress Notes     Area of Treatment Focus:  Symptom Management, Personal Safety, Community Resources/Discharge Planning, Abstinence/Relapse Prevention, Develop / Improve Independent Living Skills and Develop Socialization / Interpersonal Relationship Skills    Therapeutic Interventions/Treatment Strategies:  Support, Safety Assessments, Structured Activity and Education    Response to Treatment Strategies:  Accepted Feedback, Listened, Focused on Goals, Attentive and Accepted Support    Name of Group:  Psychotherapy Group 1, Psychotherapy Group 2    Progress Note  In Psychotherapy Group 1, Katerina continues to report depressed mood, anxious mood with shakiness, irritability and interrupted sleep. She only had one hour sleep last night. She had an appointment with a [facility] therapist on Wednesday. Her next appointment is in several weeks so she will continue with the therapist whom she pays out of pocket.  Katerina continues to walk with her two daughters, is making dinner and enjoys Yoga. She wraps herself in a blanket upon awakening for 12 minutes for self soothing and to manage the shakiness. She has an episode of fecal incontinence which creates more anxiety when she is out of the home. She met with psychiatrist who recommended Buspar and has taken off the Xanax which was creating drowsiness. This provider recommended a consult with a Neurologist for a Dementia Check. Katerina reports confusion and forgetfulness. She has ideas to structure her time over the next couple days. She may play Golf with her . Katerina finds reassurance helpful and she is able to accept feedback from group members. Group members discussed how daily structure and asserting needs can assist in symptom management. She will continue in the 55+ program.    In Psychotherpy Group 2,  Katerina and peers in group discussed the benefits of radical acceptance as well as meaningful coping strategies that have been helpful in their  recovery.           Is this a Weekly Review of the Progress on the Treatment Plan?  No

## 2017-07-07 NOTE — PROGRESS NOTES
"  Adult Mental Health Outpatient Group Therapy Progress Note     Client Initial Individualized Goals for Treatment:Katerina verbalizes the following treatment/discharge goals: \"To decrease the fear of each day. Increase my self confidence again. To be able to face the day. Decrease anxiety symptoms\".      See Initial Treatment suggestions for the client during the time between Diagnostic Assessment and completion of the Master Individualized Treatment Plan.    Treatment Goals:     see above     Area of Treatment Focus:  Symptom Management and Develop Socialization / Interpersonal Relationship Skills    Therapeutic Interventions/Treatment Strategies:  Support, Feedback, Structured Activity, Clarification and Education    Response to Treatment Strategies:  Accepted Feedback, Gave Feedback, Listened, Focused on Goals, Attentive, Accepted Support and Alert    Name of Group:  Mental health management Wellness    Description and Outcome:    Facilitated chair yoga with 7 poses focusing on affirmations and breath work for anxiety and depression. Then facilitated a body relaxation guided meditation and ended with guided visualization. Katerina reported increased feeling of relaxation and ease. Less anxious.           Is this a Weekly Review of the Progress on the Treatment Plan?  No  "

## 2017-07-11 ENCOUNTER — HOSPITAL ENCOUNTER (OUTPATIENT)
Dept: BEHAVIORAL HEALTH | Facility: CLINIC | Age: 72
End: 2017-07-11
Attending: NURSE PRACTITIONER
Payer: MEDICARE

## 2017-07-11 PROCEDURE — H2012 BEHAV HLTH DAY TREAT, PER HR: HCPCS

## 2017-07-11 NOTE — PROGRESS NOTES
Group Therapy Progress Notes     Area of Treatment Focus:  Symptom Management, Personal Safety, Community Resources/Discharge Planning, Abstinence/Relapse Prevention, Develop / Improve Independent Living Skills and Develop Socialization / Interpersonal Relationship Skills    Therapeutic Interventions/Treatment Strategies:  Support, Safety Assessments, Structured Activity and Education    Response to Treatment Strategies:  Accepted Feedback, Listened, Focused on Goals, Attentive and Accepted Support    Name of Group:  Psychotherapy Group 1, Psychotherapy Group 2 and Coping with Depression    Progress Note  In Psychotherapy Group 1, Katerina reports anxiety and depressed mood. Denies S/I or safety issues. She was able to contact the nurse at her psychiatrist office and was taken off the Buspar due to shakiness and placed on Xanax again. She looked more calm today. She continues to have rumination and worry. She is able to connect with friends and family to engage in distracting activities. She cooks dinner daily. Katerina and her  were able to go to the golf course.  They painted their fence. She explored her acceptance level. Katerina and the peers in group explored symptom management tools to manage depression and anxiety symptoms. In Psychotherapy Group 2, Katerina and the peers in group explored activities of productivity to structure their time in mental health recovery despite elevated mood symptoms. In Coping with Depression, Katerina  identified strengths and traits to create a personal affirmation statement to use as a tool for cognitive reframing and to cultivate self esteem. She was able to process family dynamics as a child that contributed to developing a mood disorder. She will continue in the 55+ program.          Is this a Weekly Review of the Progress on the Treatment Plan?    No

## 2017-07-12 NOTE — PROGRESS NOTES
"  Adult Mental Health Outpatient Group Therapy Progress Note     Client Initial Individualized Goals for Treatment:Katerina verbalizes the following treatment/discharge goals: \"To decrease the fear of each day. Increase my self confidence again. To be able to face the day. Decrease anxiety symptoms\".      See Initial Treatment suggestions for the client during the time between Diagnostic Assessment and completion of the Master Individualized Treatment Plan.    Treatment Goals:  . Will plan and problem-solve to return to previous socia, enjoyable activities and decrease lonliness and isolation  Will plan and problem-solve to decrease fears and anxiety and increase self confidence  Client will notify staff when needing assistance to develop or implement a coping plan to manage suicidal or self injurious urges.     Area of Treatment Focus:  Symptom Management and Develop Socialization / Interpersonal Relationship Skills    Therapeutic Interventions/Treatment Strategies:  Support, Feedback, Clarification and Education    Response to Treatment Strategies:  Accepted Feedback, Gave Feedback, Listened, Focused on Goals, Attentive, Accepted Support and Alert    Name of Group:  Mental health management     Description and Outcome:  Group began with focus on breathing with intention of self soothing and nurturing.  Directions were given to scan body for areas of emotion including finding that part of themselves that they can look to for peace.  Group was then offered instructions for yoga warrior pose, using the pose to ground themselves in the present.  They were then instructed to extend arm forward toward present and extend behind themselves to past.  Group members were encouraged to bring something from the past they needed and to notice the connections with others as they extended forward.   Katerina shared that she was able to experience some calming of anxiety in group.    Is this a Weekly Review of the Progress on the " Treatment Plan?  No

## 2017-07-14 ENCOUNTER — HOSPITAL ENCOUNTER (OUTPATIENT)
Dept: BEHAVIORAL HEALTH | Facility: CLINIC | Age: 72
End: 2017-07-14
Attending: NURSE PRACTITIONER
Payer: MEDICARE

## 2017-07-14 PROCEDURE — H2012 BEHAV HLTH DAY TREAT, PER HR: HCPCS

## 2017-07-14 NOTE — PROGRESS NOTES
"  Adult Mental Health Outpatient Group Therapy Progress Note     Client Initial Individualized Goals for Treatment:Katerina verbalizes the following treatment/discharge goals: \"To decrease the fear of each day. Increase my self confidence again. To be able to face the day. Decrease anxiety symptoms\".      See Initial Treatment suggestions for the client during the time between Diagnostic Assessment and completion of the Master Individualized Treatment Plan.    Treatment Goals:  . Will plan and problem-solve to return to previous socia, enjoyable activities and decrease lonliness and isolation  Will plan and problem-solve to decrease fears and anxiety and increase self confidence  Client will notify staff when needing assistance to develop or implement a coping plan to manage suicidal or self injurious urges.     Area of Treatment Focus:  Symptom Management and Develop Socialization / Interpersonal Relationship Skills    Therapeutic Interventions/Treatment Strategies:  Support, Feedback, Structured Activity, Clarification and Education    Response to Treatment Strategies:  Accepted Feedback, Gave Feedback, Listened, Focused on Goals, Attentive, Accepted Support and Alert    Name of Group:  Mental health management     Description and Outcome:  Ongoing presentation and discussion on radical acceptance.  Group viewed Kelly Jain video on reality acceptance.  Directed to handout that was previously given and to worksheet.  Reviewed directions for identifying area that needs to be radically accepted and ways in which to practise the skill.  Group members were encouraged to keep a record of their practise and rate their levels of acceptance.  Katerina identified wanting to practise radically acceptance her anxiety and shakiness.  She asked for clarification around experience of sadness and acceptance.    Is this a Weekly Review of the Progress on the Treatment Plan?  No        "

## 2017-07-14 NOTE — PROGRESS NOTES
Group Therapy Progress Notes     Area of Treatment Focus:  Symptom Management, Personal Safety, Community Resources/Discharge Planning, Abstinence/Relapse Prevention, Develop / Improve Independent Living Skills and Develop Socialization / Interpersonal Relationship Skills    Therapeutic Interventions/Treatment Strategies:  Support, Safety Assessments, Structured Activity and Education    Response to Treatment Strategies:  Accepted Feedback, Listened, Focused on Goals, Attentive and Accepted Support    Name of Group:  Psychotherapy Group 1 and Psychotherapy Group 2    Progress Note  In Psychotherapy Group 1, Katerina reports anxious mood with worry, rumination and shakiness. She reports increased hopelessness. Denies safety issues. She denies any plan or intent to hurt herself. She reports she can be responsible for herself and her safety. Katerina also reported the shakiness has been influencing interrupted sleep cycle. She continue to do her morning ritual of practicing mindfulness with a blanket and Yoga. She reports how her family is getting caregiver fatigue. We discussed the role of support and explored other people she may want to enroll into her recovery. She also may benefit from being asserting asking for reassurance vs. Problem solving from her family. and group members explored anxiety management strategies. We discussed the physiology of flight and fight response in the Sympathetic Nervous System. We also processed helpful CBT tools for self talk reframing. Katerina is using symptom management tools. She has activities to structure her time tomorrow when her  is at his part time job at [employer]. She will meet a friend for a bagel and another friend for a walk. She will continue in the 55+ program.      In Psychotherapy Group 2, Katerina and the group discussed balancing productivity with self care as a theme. Writer discussed the therapist transition to Krystian Baltazar who is returning from medical leave on 7/17/17.      Is this a Weekly Review of the Progress on the Treatment Plan?    No

## 2017-07-18 ENCOUNTER — HOSPITAL ENCOUNTER (OUTPATIENT)
Dept: BEHAVIORAL HEALTH | Facility: CLINIC | Age: 72
End: 2017-07-18
Attending: NURSE PRACTITIONER
Payer: MEDICARE

## 2017-07-18 PROCEDURE — H2012 BEHAV HLTH DAY TREAT, PER HR: HCPCS

## 2017-07-18 NOTE — PROGRESS NOTES
"  Adult Mental Health Outpatient Group Therapy Progress Note     Client Initial Individualized Goals for Treatment:Katerina verbalizes the following treatment/discharge goals: \"To decrease the fear of each day. Increase my self confidence again. To be able to face the day. Decrease anxiety symptoms\".      See Initial Treatment suggestions for the client during the time between Diagnostic Assessment and completion of the Master Individualized Treatment Plan.    Treatment Goals:     see above     Area of Treatment Focus:  Symptom Management and Develop Socialization / Interpersonal Relationship Skills    Therapeutic Interventions/Treatment Strategies:  Support, Feedback, Structured Activity, Clarification and Education    Response to Treatment Strategies:  Accepted Feedback, Gave Feedback, Listened, Focused on Goals, Attentive, Accepted Support and Alert    Name of Group:  Psychotherapy    Description and Outcome:  Hour 1: Katerina shared with the group that she continues to have extreme anxiety. It is to the point that in the morning she needs to sit wrapped in blanket for a while before she can get herself to stop trembling. She shared that she is now afraid to be in her own home, not only alone, but even if her  is there. She indicated that she does get out and walk daily if possible. She is upset with her psychiatrist because of frequent medication changes, but none seem to help. She is now on Xanax, but complains that she is far too groggy.  Hour 2: The group discussed the theme that anxiety and depression seem to go hand in hand with each other and how each person in the group is battling both. This was validating for the group members and they shared how they each tried to cope with this combination.        Is this a Weekly Review of the Progress on the Treatment Plan?  No  "

## 2017-07-19 NOTE — PROGRESS NOTES
"  Adult Mental Health Outpatient Group Therapy Progress Note     Client Initial Individualized Goals for Treatment:Katerina verbalizes the following treatment/discharge goals: \"To decrease the fear of each day. Increase my self confidence again. To be able to face the day. Decrease anxiety symptoms\".      See Initial Treatment suggestions for the client during the time between Diagnostic Assessment and completion of the Master Individualized Treatment Plan.    Treatment Goals:  . Will plan and problem-solve to return to previous socia, enjoyable activities and decrease lonliness and isolation  Will plan and problem-solve to decrease fears and anxiety and increase self confidence  Client will notify staff when needing assistance to develop or implement a coping plan to manage suicidal or self injurious urges.       Area of Treatment Focus:  Symptom Management and Develop Socialization / Interpersonal Relationship Skills    Therapeutic Interventions/Treatment Strategies:  Support, Feedback, Structured Activity, Clarification and Education    Response to Treatment Strategies:  Accepted Feedback, Gave Feedback, Listened, Focused on Goals, Attentive, Accepted Support and Alert    Name of Group:  Mental health management     Description and Outcome:  Group began with guided breathing with focus on self awareness.  Group members were then encouraged to fill in blank \"I want...\" from  Group today.  Common themes of wanting to move and to decrease anxiety.  The group explored use of center and weight shifting to mindfully focus on the present and decrease anxiety.  As the group explored balance, they were encouraged to utilize each other for support.  Finally a prop was introduced to further explore use of center and support.  Katerina was engaged.  She had difficulty identifying her wants in group and in using available support.  Identified anxiety.    Is this a Weekly Review of the Progress on the Treatment Plan?  No        "

## 2017-07-20 NOTE — PROGRESS NOTES
"Group Therapy Progress Notes     Area of Treatment Focus:  Symptom Management    Therapeutic Interventions/Treatment Strategies:  Support, Feedback, Structured Activity, Problem Solving and Clarification    Response to Treatment Strategies:  Accepted Feedback, Gave Feedback, Listened, Focused on Goals, Attentive, Accepted Support and Alert    Name of Group:  Coping with Depression/Anxiety    Progress Note  Client participated in a discussion about \"Ways to Increase Hope\". She learned about techniques and identified those that she will implement. Katerina identified the importance of a grateful journal.          Is this a Weekly Review of the Progress on the Treatment Plan?  No       "

## 2017-07-21 ENCOUNTER — HOSPITAL ENCOUNTER (OUTPATIENT)
Dept: BEHAVIORAL HEALTH | Facility: CLINIC | Age: 72
End: 2017-07-21
Attending: NURSE PRACTITIONER
Payer: MEDICARE

## 2017-07-21 PROCEDURE — H2012 BEHAV HLTH DAY TREAT, PER HR: HCPCS

## 2017-07-21 NOTE — PROGRESS NOTES
"  Adult Mental Health Outpatient Group Therapy Progress Note     Client Initial Individualized Goals for Treatment:Katerina verbalizes the following treatment/discharge goals: \"To decrease the fear of each day. Increase my self confidence again. To be able to face the day. Decrease anxiety symptoms\".      See Initial Treatment suggestions for the client during the time between Diagnostic Assessment and completion of the Master Individualized Treatment Plan.    Treatment Goals:     see above     Area of Treatment Focus:  Symptom Management and Develop Socialization / Interpersonal Relationship Skills    Therapeutic Interventions/Treatment Strategies:  Support, Feedback, Structured Activity, Clarification and Education    Response to Treatment Strategies:  Accepted Feedback, Gave Feedback, Listened, Focused on Goals, Attentive, Accepted Support and Alert    Name of Group:  Mental health management Wellness    Description and Outcome:    Facilitated 7 chair yoga poses with attention on calming the nervous system by using breath and gentle stretch. Then led progressive relaxation with focus on letting go of thinking, finally led guided meditation. Katerina observing she is tight from sitting all day, reports feeling calmed after guided meditation, reported she thinks it would help to have a taped version of the meditation that she could use.       Is this a Weekly Review of the Progress on the Treatment Plan?  No  "

## 2017-07-21 NOTE — PROGRESS NOTES
"  Adult Mental Health Outpatient Group Therapy Progress Note     Client Initial Individualized Goals for Treatment:Katerina verbalizes the following treatment/discharge goals: \"To decrease the fear of each day. Increase my self confidence again. To be able to face the day. Decrease anxiety symptoms\".      See Initial Treatment suggestions for the client during the time between Diagnostic Assessment and completion of the Master Individualized Treatment Plan.    Treatment Goals:  . Will plan and problem-solve to return to previous socia, enjoyable activities and decrease lonliness and isolation  Will plan and problem-solve to decrease fears and anxiety and increase self confidence  Client will notify staff when needing assistance to develop or implement a coping plan to manage suicidal or self injurious urges.     Area of Treatment Focus:  Symptom Management and Develop Socialization / Interpersonal Relationship Skills    Therapeutic Interventions/Treatment Strategies:  Support, Feedback, Structured Activity, Clarification and Education    Response to Treatment Strategies:  Accepted Feedback, Gave Feedback, Listened, Focused on Goals, Attentive, Accepted Support and Alert    Name of Group:  Mental health management     Description and Outcome:  Ongoing education and discussion of radical acceptance.  Completion of Kelly Jain video on chaos to freedom, focusing on turning the mind and willingness.  Processing of homework completion and barriers to completion. Katerina was engaged and insightful.  States that she is aware of anxiety involved in radical acceptance.  Accepted feedback into her nonverbals as she talked about radical acceptance.  Accepted reassurance.  Is this a Weekly Review of the Progress on the Treatment Plan?  No        "

## 2017-07-21 NOTE — PROGRESS NOTES
"  Adult Mental Health Outpatient Group Therapy Progress Note     Client Initial Individualized Goals for Treatment:Katerina verbalizes the following treatment/discharge goals: \"To decrease the fear of each day. Increase my self confidence again. To be able to face the day. Decrease anxiety symptoms\".      See Initial Treatment suggestions for the client during the time between Diagnostic Assessment and completion of the Master Individualized Treatment Plan.    Treatment Goals:     see above     Area of Treatment Focus:  Symptom Management and Develop Socialization / Interpersonal Relationship Skills    Therapeutic Interventions/Treatment Strategies:  Support, Feedback, Structured Activity, Clarification and Education    Response to Treatment Strategies:  Accepted Feedback, Gave Feedback, Listened, Focused on Goals, Attentive, Accepted Support and Alert    Name of Group:  Psychotherapy    Description and Outcome:  Hour 1: Katerina shared with the group that she continues to be quite anxious. She stated that she is feeling a bit more relaxed now that she was in group. She reported that she did not sleep at all last night and attributes this to watching a rather intense TV show before going to bed. She reported that she became extremely anxious yesterday during the day when her  left to go to work with their son. She was encouraged to try and explore what it is that she is thinking when she has the intense episodes of anxiety. She was also encouraged to practice some relaxation breathing techniques. She was given the \"Anxiety and Phobia Workbook\" to review over the week end to see if there were concrete exercises she could use to help calm herself.  Hour 2: The group explored the common theme of how to deal with mornings when dealing with depression and/or anxiety. It appears to be a struggle to get out of bed or get moving to face the day. The group discussed ways to use meditation and spirituality practices as a way " to calm themselves.        Is this a Weekly Review of the Progress on the Treatment Plan?  Yes.

## 2017-07-25 ENCOUNTER — HOSPITAL ENCOUNTER (OUTPATIENT)
Dept: BEHAVIORAL HEALTH | Facility: CLINIC | Age: 72
End: 2017-07-25
Attending: NURSE PRACTITIONER
Payer: MEDICARE

## 2017-07-25 PROCEDURE — H2012 BEHAV HLTH DAY TREAT, PER HR: HCPCS

## 2017-07-25 NOTE — PROGRESS NOTES
"  Adult Mental Health Outpatient Group Therapy Progress Note     Client Initial Individualized Goals for Treatment:Katerina verbalizes the following treatment/discharge goals: \"To decrease the fear of each day. Increase my self confidence again. To be able to face the day. Decrease anxiety symptoms\".      See Initial Treatment suggestions for the client during the time between Diagnostic Assessment and completion of the Master Individualized Treatment Plan.    Treatment Goals:     see above     Area of Treatment Focus:  Symptom Management and Develop Socialization / Interpersonal Relationship Skills    Therapeutic Interventions/Treatment Strategies:  Support, Feedback, Structured Activity, Clarification and Education    Response to Treatment Strategies:  Accepted Feedback, Gave Feedback, Listened, Focused on Goals, Attentive, Accepted Support and Alert    Name of Group:  Psychotherapy    Description and Outcome:  Hour 1: Katerina reported to the group that she is frustrated with increasing tremulousness in her hands. She indicated that it is particularly bad in the morning and it takes her \"12 minutes\" to calm herself. Her psychiatrist has referred her to see neurologist, but she cannot get in until the end of August. She is considering calling to get herself on a cancellation list to get in sooner if possible. Katerina stated that if it weren't for the shaking, she would be feeling pretty good. However, a bit later she stated that she can't tell if she's getting better or not. The group pointed out to her that she is doing things, like going shopping by herself, that she was not doing when she started the program. She was pleased to hear that and agreed that she must be improving.    Hour 2: The group discussed ways that they individually tried to motivate themselves. A commonality of the group currently is that they are all \"stuck\" due to depression and/or anxiety.         Is this a Weekly Review of the Progress on the " Treatment Plan?  No

## 2017-07-26 NOTE — PROGRESS NOTES
"  Adult Mental Health Outpatient Group Therapy Progress Note     Client Initial Individualized Goals for Treatment:Katerina verbalizes the following treatment/discharge goals: \"To decrease the fear of each day. Increase my self confidence again. To be able to face the day. Decrease anxiety symptoms\".      See Initial Treatment suggestions for the client during the time between Diagnostic Assessment and completion of the Master Individualized Treatment Plan.    Treatment Goals:  . Will plan and problem-solve to return to previous socia, enjoyable activities and decrease lonliness and isolation  Will plan and problem-solve to decrease fears and anxiety and increase self confidence  Client will notify staff when needing assistance to develop or implement a coping plan to manage suicidal or self injurious urges.     Area of Treatment Focus:  Symptom Management and Develop Socialization / Interpersonal Relationship Skills    Therapeutic Interventions/Treatment Strategies:  Support, Feedback, Clarification and Education    Response to Treatment Strategies:  Accepted Feedback, Gave Feedback, Listened, Focused on Goals, Attentive, Accepted Support and Alert    Name of Group:  Mental health management     Description and Outcome:  his group followed group on self compassion led by .  Group members noted that self compassion was a difficult concept to put into practise.  Breathing warmup was given with focus on being kind and gentle with body.  This theme of self compassion was developed into a movement phrase.  The group used imagination to develop a \"synchronized swimming routine\" using self compassion and methaphors such as staying afloat, acceptance, diving deep, using support, etc.  The group was animated and spontaneous.  Reminded group that one could \"play\" with heavy ideas as a way of addressing them.  Katerina was engaged.  She shared that self compassion was difficult but seemed relieved with idea that concept of " "self compassion could be \"played with.\"    Is this a Weekly Review of the Progress on the Treatment Plan?  No        "

## 2017-07-28 ENCOUNTER — HOSPITAL ENCOUNTER (OUTPATIENT)
Dept: BEHAVIORAL HEALTH | Facility: CLINIC | Age: 72
End: 2017-07-28
Attending: NURSE PRACTITIONER
Payer: MEDICARE

## 2017-07-28 PROCEDURE — H2012 BEHAV HLTH DAY TREAT, PER HR: HCPCS

## 2017-07-28 NOTE — PROGRESS NOTES
"  Adult Mental Health Outpatient Group Therapy Progress Note     Client Initial Individualized Goals for Treatment:Katerina verbalizes the following treatment/discharge goals: \"To decrease the fear of each day. Increase my self confidence again. To be able to face the day. Decrease anxiety symptoms\".      See Initial Treatment suggestions for the client during the time between Diagnostic Assessment and completion of the Master Individualized Treatment Plan.    Treatment Goals:     see above     Area of Treatment Focus:  Symptom Management and Develop Socialization / Interpersonal Relationship Skills    Therapeutic Interventions/Treatment Strategies:  Support, Feedback, Structured Activity, Clarification and Education    Response to Treatment Strategies:  Accepted Feedback, Gave Feedback, Listened, Focused on Goals, Attentive, Accepted Support and Alert    Name of Group:  Psychotherapy, Coping With Depression/Anxiety    Description and Outcome:  Hour 1: Katerina shared with the group that she was feeling a bit less anxious today and appeared visibly calmer. However, she reported that the past two days have \"been terrible\". She reports uncontrollable tremulousness and anxiety. Though she did share that she went golfing with her  and another couple yesterday and they spent 4 hours together and it went quite well.   Hour 2: The group discussed their frustration with the insidious nature of depression and how it colors how they see other aspects of their lives in such a negative way. They shared ways in which they individually coped with trying to remain positive and motivated.    Coping with Depression/Anxiety: The group read and discussed the article by Wilian Castro entitled, \"The Most Important Things in Life\". The client actively engaged in the discussion of the article and gave examples of how some of these ideas fit into their life.         Is this a Weekly Review of the Progress on the Treatment Plan?  Yes  "     Are Treatment Plan Goals being addressed?  Yes, continue treatment goals      Are Treatment Plan Strategies to Address Goals Effective?  Yes, continue treatment strategies      Are there any current contracts in place?  Yes. safety

## 2017-07-28 NOTE — PROGRESS NOTES
"  Adult Mental Health Outpatient Group Therapy Progress Note     Client Initial Individualized Goals for Treatment:Katerina verbalizes the following treatment/discharge goals: \"To decrease the fear of each day. Increase my self confidence again. To be able to face the day. Decrease anxiety symptoms\".      See Initial Treatment suggestions for the client during the time between Diagnostic Assessment and completion of the Master Individualized Treatment Plan.    Treatment Goals:     see above     Area of Treatment Focus:  Symptom Management and Develop Socialization / Interpersonal Relationship Skills    Therapeutic Interventions/Treatment Strategies:  Support, Feedback, Structured Activity, Clarification and Education    Response to Treatment Strategies:  Accepted Feedback, Gave Feedback, Listened, Focused on Goals, Attentive, Accepted Support and Alert    Name of Group:  Mental health management Wellness    Description and Outcome    Topic: Stress management. With use of worksheet reviewed \" fight or flight\" response of the sympathetic nervous system and the effect on the nervous system due to cumulative stress, including anxiety and depression. Reviewed 2 protective factors: cognitive reappraisal and group affiliation. Facilitated a discussion about these 2 factors. Ended with short mindfulness meditation to practice at home for calming the nervous system. Katerina verbalized understanding course, identified she is working to reframe how she thinks about her physical health issue of tremulousness in her hands. Appeared calmer today, less anxious and verablized this as well.      Is this a Weekly Review of the Progress on the Treatment Plan?  No  "

## 2017-08-01 ENCOUNTER — HOSPITAL ENCOUNTER (OUTPATIENT)
Dept: BEHAVIORAL HEALTH | Facility: CLINIC | Age: 72
End: 2017-08-01
Attending: NURSE PRACTITIONER
Payer: MEDICARE

## 2017-08-01 PROCEDURE — H2012 BEHAV HLTH DAY TREAT, PER HR: HCPCS

## 2017-08-01 NOTE — PROGRESS NOTES
"Group Therapy Progress Notes     Area of Treatment Focus:  Symptom Management, Develop / Improve Independent Living Skills and Develop Socialization / Interpersonal Relationship Skills    Therapeutic Interventions/Treatment Strategies:  Support, Feedback, Problem Solving, Clarification and Education    Response to Treatment Strategies:  Accepted Feedback, Gave Feedback, Listened, Focused on Goals, Attentive, Accepted Support and Alert    Name of Group:  Psychotherapy     Progress Note  In Session 1, Katerina reported that she was able to manage anxiety when she went out with her  to do errands but had more severe anxiety when she was with family gathered for a birthday party. She reports that she sat by herself and had difficulty talking with others because the anxiety was so high. She says that she became critical of herself for not being able to relate well and compared herself to her  who is very at ease and sociable. Others in the group identified with Katerina and gave her feedback on coping and tolerating.    In Session 2, Katerina talked about anxiety she is feeling about a family reunion that is scheduled for this Saturday. She says she has opted not to go but thinks of her self as a \"coward\". She would have to go alone and she would have to drive quite a ways both of which contribute to increased anxiety. Looked at how negative comparisons with others and self-criticism contribute to anxiety. Identified other, more neutral ways of looking at herself in the situation. Katerina is also concerned about shaking she experiences, especially in the mornings. It becomes worse when she is anxious. She says she has an appt with a neurologist coming up in two weeks.      Is this a Weekly Review of the Progress on the Treatment Plan?  No     "

## 2017-08-02 NOTE — PROGRESS NOTES
"Group Therapy Progress Notes     Area of Treatment Focus:  Symptom Management    Therapeutic Interventions/Treatment Strategies:  Support, Feedback, Structured Activity, Problem Solving and Clarification    Response to Treatment Strategies:  Accepted Feedback, Gave Feedback, Listened, Focused on Goals, Attentive, Accepted Support and Alert    Name of Group:  Mental Health Management    Progress Note  Client participated in the second half of \"Ways to Increase Hope\". An emphasis was placed on taking small steps to reduce depression and anxiety.          Is this a Weekly Review of the Progress on the Treatment Plan?  No  "

## 2017-08-03 NOTE — PROGRESS NOTES
Psychiatry staffing: case discussed  Diagnosis: Bipolar I.   Continue to be anxious. Had med changes recently.     Current Outpatient Prescriptions   Medication     GABAPENTIN PO     OLANZapine (ZYPREXA PO)     Divalproex Sodium (DEPAKOTE PO)     MELATONIN PO     clonazePAM (KLONOPIN) 0.5 MG tablet     multivitamin, therapeutic (THERA-VIT) TABS     calcium carbonate (OS- MG [place]) 500 MG tablet     No current facility-administered medications for this encounter.      Past Medical History:   Diagnosis Date     Arthritis     oesteoporosis and OA-hands     Cancer (H) 2015    skin     Depressive disorder     Bipolar

## 2017-08-04 ENCOUNTER — HOSPITAL ENCOUNTER (OUTPATIENT)
Dept: BEHAVIORAL HEALTH | Facility: CLINIC | Age: 72
End: 2017-08-04
Attending: NURSE PRACTITIONER
Payer: MEDICARE

## 2017-08-04 PROCEDURE — H2012 BEHAV HLTH DAY TREAT, PER HR: HCPCS

## 2017-08-04 NOTE — PROGRESS NOTES
"Group Therapy Progress Notes     Area of Treatment Focus:  Symptom Management    Therapeutic Interventions/Treatment Strategies:  Support, Feedback, Structured Activity, Problem Solving and Clarification    Response to Treatment Strategies:  Accepted Feedback, Gave Feedback, Listened, Focused on Goals, Attentive, Accepted Support and Alert    Name of Group:  Coping with Depression and Anxiety    Progress Note  Client learned about Dysfunctional Beliefs and the ways that these thought patterns increase depression and anxiety. Client reports a struggle with \"perfectionism and anxious over-concern\" and made a plan to try to replace this thought pattern with more reasonable beliefs.          Is this a Weekly Review of the Progress on the Treatment Plan?  No       "

## 2017-08-04 NOTE — PROGRESS NOTES
"Group Therapy Progress Notes     Area of Treatment Focus:  Symptom Management, Abstinence/Relapse Prevention, Develop / Improve Independent Living Skills and Develop Socialization / Interpersonal Relationship Skills    Therapeutic Interventions/Treatment Strategies:  Support, Feedback, Clarification and Education    Response to Treatment Strategies:  Accepted Feedback, Gave Feedback, Listened, Focused on Goals, Attentive, Accepted Support and Alert    Name of Group:  Psychotherapy     Progress Note  In Session 1, Katerina appeared less shaky and reported that she is on a new medication for tremors. She has taken it twice and the effect has been positive. Katerina is taking a \"wait and see\" approach but is hopeful. Katerina reports that her therapist suggested that she write each day about what she is grateful for and read that in the morning when she is most anxious. Katerina has tried that today and thinks that she sees some lessening of anxiety. Katerina expresses negative thinking about herself, especially related to \"not doing enough\" and \"not getting out enough.\" Challenged to be more compassionate toward herself.    In Session 2, Katerina did some problem solving around going to a family reunion this weekend. She reports feeling very ambivalent about driving there and about talking to people when she is there. Looked at some strategies for calming anxiety and for being more assertive while she is with the group. Katerina recalled that the first time she had a panic attack was while she was driving. She pulled over and called 911, then her  came and got her. She says she has never had that happen again and feels confident about driving to the event this weekend. She would be hard on herself for not going and peers and therapist helped her to think of more neutral responses to whatever decision she makes.          Is this a Weekly Review of the Progress on the Treatment Plan?  Yes.      Are Treatment Plan Goals being " addressed?  Yes, continue treatment goals      Are Treatment Plan Strategies to Address Goals Effective?  Yes, continue treatment strategies      Are there any current contracts in place?  Yes. Behavioral

## 2017-08-04 NOTE — PROGRESS NOTES
"  Adult Mental Health Outpatient Group Therapy Progress Note     Client Initial Individualized Goals for Treatment:Katerina verbalizes the following treatment/discharge goals: \"To decrease the fear of each day. Increase my self confidence again. To be able to face the day. Decrease anxiety symptoms\".      See Initial Treatment suggestions for the client during the time between Diagnostic Assessment and completion of the Master Individualized Treatment Plan.    Treatment Goals:     see above     Area of Treatment Focus:  Symptom Management and Develop Socialization / Interpersonal Relationship Skills    Therapeutic Interventions/Treatment Strategies:  Support, Feedback, Structured Activity, Clarification and Education    Response to Treatment Strategies:  Accepted Feedback, Gave Feedback, Listened, Focused on Goals, Attentive, Accepted Support and Alert    Name of Group:  Mental health management Wellness    Description and Outcome    Introduced topic of neuroplasticity, then moved into 7 Chair Yoga Postures to develop both flexibility in body and brain. Ended class with some hand yoga ( mudras) with mindful breathing. Katerina participated fully, appeared less anxious. Verbalized understanding concepts in class. Writer provided skills building around mind body connection.     Is this a Weekly Review of the Progress on the Treatment Plan?  No  "

## 2017-08-08 ENCOUNTER — HOSPITAL ENCOUNTER (OUTPATIENT)
Dept: BEHAVIORAL HEALTH | Facility: CLINIC | Age: 72
End: 2017-08-08
Attending: NURSE PRACTITIONER
Payer: MEDICARE

## 2017-08-08 PROCEDURE — H2012 BEHAV HLTH DAY TREAT, PER HR: HCPCS

## 2017-08-08 NOTE — PROGRESS NOTES
"Group Therapy Progress Notes     Area of Treatment Focus:  Symptom Management, Community Resources/Discharge Planning, Abstinence/Relapse Prevention, Develop / Improve Independent Living Skills and Develop Socialization / Interpersonal Relationship Skills    Therapeutic Interventions/Treatment Strategies:  Support, Feedback, Problem Solving, Clarification and Education    Response to Treatment Strategies:  Accepted Feedback, Gave Feedback, Listened, Focused on Goals, Attentive, Accepted Support and Alert    Name of Group:  Psychotherapy     Progress Note  In Session 1, Katerina reported that she walked with friends on Saturday and also drove to [place] to attend the family reunion she had been feeling anxious about. She reported using self talk to motivate herself and overcome anxious feelings and thoughts. She says she is writing every morning. Medication for tremors is apparently working effectively. Katerina is concerned about being home alone with nothing to do when  goes to work. Looked at motivation for planning an activity during that time or allowing herself to be inactive in a positive way. Katerina says she wants \"peace\" but says she doesn't know what would give her peace. She will explore the question, she says.    In Session 2, Katerina participated in a discussion of dealing with low motivation. Looked at need for and benefit of self knowledge, of what Katerina wants in her life, as well as of being willing to make mistakes or take time away from other tasks to help motivate action toward one's goal.          Is this a Weekly Review of the Progress on the Treatment Plan?  No     "

## 2017-08-08 NOTE — PROGRESS NOTES
Group Therapy Progress Notes     Area of Treatment Focus:  Symptom Management    Therapeutic Interventions/Treatment Strategies:  Support, Feedback, Structured Activity, Problem Solving and Clarification    Response to Treatment Strategies:  Accepted Feedback, Gave Feedback, Listened, Focused on Goals, Attentive, Accepted Support and Alert    Name of Group:  Coping with Depression and Anxiety    Progress Note  Client learned about mind-sets associated with procrastination and identified perfectionism as the one that is most troublesome. Client then learned about ways to counter this mind-set in an effort to reduce depression.           Is this a Weekly Review of the Progress on the Treatment Plan?  No

## 2017-08-09 NOTE — PROGRESS NOTES
"  Adult Mental Health Outpatient Group Therapy Progress Note     Client Initial Individualized Goals for Treatment:Katerina verbalizes the following treatment/discharge goals: \"To decrease the fear of each day. Increase my self confidence again. To be able to face the day. Decrease anxiety symptoms\".      See Initial Treatment suggestions for the client during the time between Diagnostic Assessment and completion of the Master Individualized Treatment Plan.    Treatment Goals:  . Will plan and problem-solve to return to previous socia, enjoyable activities and decrease lonliness and isolation  Will plan and problem-solve to decrease fears and anxiety and increase self confidence  Client will notify staff when needing assistance to develop or implement a coping plan to manage suicidal or self injurious urges.     Area of Treatment Focus:  Symptom Management and Develop Socialization / Interpersonal Relationship Skills    Therapeutic Interventions/Treatment Strategies:  Support, Feedback, Structured Activity, Clarification and Education    Response to Treatment Strategies:  Accepted Feedback, Gave Feedback, Listened, Focused on Goals, Attentive, Accepted Support and Alert    Name of Group:  Mental health management     Description and Outcome:  Focus on use of breathe and imagery to increase sense of calm and self compassion.  Group members when checking in had identified issues with perfectionism and in general being hard on themselves.  Encouraged during inhale to breathe in words of encouragement and let go during inhale of harsh self talk. Group members were asked to pay attention to body cues as they told themselves words of encouragement  And noticed decrease in tension and anxiety. Group members agreed to practise throughout the week.   Katerina shared that she was able to reduce anxiety level during breathing exercise.  Accepted feedback.    Is this a Weekly Review of the Progress on the Treatment Plan?  No        "

## 2017-08-11 ENCOUNTER — HOSPITAL ENCOUNTER (OUTPATIENT)
Dept: BEHAVIORAL HEALTH | Facility: CLINIC | Age: 72
End: 2017-08-11
Attending: NURSE PRACTITIONER
Payer: MEDICARE

## 2017-08-11 PROCEDURE — H2012 BEHAV HLTH DAY TREAT, PER HR: HCPCS

## 2017-08-11 NOTE — PROGRESS NOTES
"  Adult Mental Health Outpatient Group Therapy Progress Note     Client Initial Individualized Goals for Treatment:Katerina verbalizes the following treatment/discharge goals: \"To decrease the fear of each day. Increase my self confidence again. To be able to face the day. Decrease anxiety symptoms\".      See Initial Treatment suggestions for the client during the time between Diagnostic Assessment and completion of the Master Individualized Treatment Plan.    Treatment Goals:     see above     Area of Treatment Focus:  Symptom Management and Develop Socialization / Interpersonal Relationship Skills    Therapeutic Interventions/Treatment Strategies:  Support, Feedback, Structured Activity, Clarification and Education    Response to Treatment Strategies:  Accepted Feedback, Gave Feedback, Listened, Focused on Goals, Attentive, Accepted Support and Alert    Name of Group: Group Psychotherapy    Description and Outcome:  Hour 1: It was noticed that Katerina appeared much less tremulous today. There was no shaking observed in her hands at all. When this was brought up, she reported that she is now \"shaking inside\" and it gets progressively worse everyday. However, she also shared that she gets out and walks everyday, does yoga every morning, and continues to be somewhat socially active. She reported that right now her biggest dilemma is whether she should change from her therapist of two years to a new therapist who practices CBT. She feels she is unable to make a decision. When a suggestion is made to consider one over the other, she is quick to discount it. Eventually, she settled on the idea of talking to her current therapist about giving the CBT a temporary trial and will bring this up the next time they meet.     Hour 2: The group explored ways that they could motivate themselves to get out of the house and increase socializing. This is a common problem for all of the current group members and their support of each " other has been very positive around this. We will address the follow through in the next psychotherapy group.        Is this a Weekly Review of the Progress on the Treatment Plan?  Yes.        Are Treatment Plan Goals being addressed?  Yes, continue treatment goals          Are Treatment Plan Strategies to Address Goals Effective?  Yes, continue treatment strategies          Are there any current contracts in place?  Yes. Safety

## 2017-08-11 NOTE — PROGRESS NOTES
"  Adult Mental Health Outpatient Group Therapy Progress Note     Client Initial Individualized Goals for Treatment:Katerina verbalizes the following treatment/discharge goals: \"To decrease the fear of each day. Increase my self confidence again. To be able to face the day. Decrease anxiety symptoms\".      See Initial Treatment suggestions for the client during the time between Diagnostic Assessment and completion of the Master Individualized Treatment Plan.    Treatment Goals:     see above     Area of Treatment Focus:  Symptom Management and Develop Socialization / Interpersonal Relationship Skills    Therapeutic Interventions/Treatment Strategies:  Support, Feedback, Structured Activity, Clarification and Education    Response to Treatment Strategies:  Accepted Feedback, Gave Feedback, Listened, Focused on Goals, Attentive, Accepted Support and Alert    Name of Group:  Mental health management Wellness/ Mental health management      Description and Outcome     1 PM Hour: Mental health management- Introduced and discussed the concept of equanimity. Defined this term as: balance, composure, calmness, dispassion. Led a discussion of how to work to develop more of an attitude of equanimity to manage emotional and environmental stressors and the benefits of cultivating equanimity such as creating more connectivity to others and empathy. Katerina listened did not participate in discussion other than did assist in reading material to group. She appears less flat in affect, less anxious, but still seems preoccupied and ruminative.     I    2PM Hour: Wellness and Mental Health- moved into working with the concept of equanimity through calming the mind/body. The group participated in chair GiGong and Chair Yoga postures and ended with a 5 minute meditation. Katerina participated did not offer comment.       .Is this a Weekly Review of the Progress on the Treatment Plan?  No  "

## 2017-08-15 ENCOUNTER — HOSPITAL ENCOUNTER (OUTPATIENT)
Dept: BEHAVIORAL HEALTH | Facility: CLINIC | Age: 72
End: 2017-08-15
Attending: NURSE PRACTITIONER
Payer: MEDICARE

## 2017-08-15 PROCEDURE — H2012 BEHAV HLTH DAY TREAT, PER HR: HCPCS

## 2017-08-15 NOTE — PROGRESS NOTES
"  Adult Mental Health Outpatient Group Therapy Progress Note     Client Initial Individualized Goals for Treatment:Katerina verbalizes the following treatment/discharge goals: \"To decrease the fear of each day. Increase my self confidence again. To be able to face the day. Decrease anxiety symptoms\".      See Initial Treatment suggestions for the client during the time between Diagnostic Assessment and completion of the Master Individualized Treatment Plan.    Treatment Goals:     see above     Area of Treatment Focus:  Symptom Management and Develop Socialization / Interpersonal Relationship Skills    Therapeutic Interventions/Treatment Strategies:  Support, Feedback, Structured Activity, Clarification and Education    Response to Treatment Strategies:  Accepted Feedback, Gave Feedback, Listened, Focused on Goals, Attentive, Accepted Support and Alert    Name of Group: Psychotherapy, Coping w/Depression & Anxiety    Description and Outcome   Psychotherapy:  Hour 1: Katerina shared with the group that she continues to struggle with her anxiety. She had gone out for dinner with some other couples with her  on Friday evening and they had to leave early because she was feeling too anxious. She stated that she felt bad about this because her  really wanted to stay. She continues to appear less tremulous, but describes herself as \"shaking inside\". She reported that she has been documenting her anxiety at different points of the day, as well. She sees her psychiatrist tomorrow. She was encouraged to address all of her medications concerns with her psychiatrist and then just leave it at that. Take her medications as prescribed and stop focusing on her anxiety, but rather engage in other activities that she enjoys.     Hour 2: The group explored how depression saps the motivation out of each of them and what behavior changes might be helpful in breaking that condition of \"being stuck\".      Coping w/Depression & " "Anxiety: The group read discussed the handout \"True Sense of Self\". We explored how depression impacts one's perception of themselves and the detrimental effect it has on self-esteem. This in turn makes it more difficult to alfaro depression.        .Is this a Weekly Review of the Progress on the Treatment Plan?  No  "

## 2017-08-16 NOTE — PROGRESS NOTES
"  Adult Mental Health Outpatient Group Therapy Progress Note     Client Initial Individualized Goals for Treatment:Katerina verbalizes the following treatment/discharge goals: \"To decrease the fear of each day. Increase my self confidence again. To be able to face the day. Decrease anxiety symptoms\".      See Initial Treatment suggestions for the client during the time between Diagnostic Assessment and completion of the Master Individualized Treatment Plan.    Treatment Goals:  . Will plan and problem-solve to return to previous socia, enjoyable activities and decrease lonliness and isolation  Will plan and problem-solve to decrease fears and anxiety and increase self confidence  Client will notify staff when needing assistance to develop or implement a coping plan to manage suicidal or self injurious urges.       Area of Treatment Focus:  Symptom Management and Develop Socialization / Interpersonal Relationship Skills    Therapeutic Interventions/Treatment Strategies:  Support, Feedback, Structured Activity and Clarification    Response to Treatment Strategies:  Accepted Feedback, Gave Feedback, Listened, Focused on Goals, Attentive, Accepted Support and Alert    Name of Group:  Mental health management     Description and Outcome:  Group members checked in sharing that they had been discussing self esteem and were processing thoughts about current states and contemplating what they might want to engage in the future.  Reviewed concept of mindfulness, emphasizing role of intention and curiousity (engagement). Group members explored an embodiment of disengaged vs. Engaged using their breath and eye.  They identified improved mental state and less tension when staying present.  Also noted was that everyone in the group had the ability to be engaged. Katerina was engaged.  She noted that she was able to practise engagement and that anxiety lessened when engaged and mindful.    Is this a Weekly Review of the Progress on the " Treatment Plan?  No

## 2017-08-18 ENCOUNTER — HOSPITAL ENCOUNTER (OUTPATIENT)
Dept: BEHAVIORAL HEALTH | Facility: CLINIC | Age: 72
End: 2017-08-18
Attending: NURSE PRACTITIONER
Payer: MEDICARE

## 2017-08-18 PROCEDURE — H2012 BEHAV HLTH DAY TREAT, PER HR: HCPCS

## 2017-08-18 NOTE — PROGRESS NOTES
"  Adult Mental Health Outpatient Group Therapy Progress Note     Client Initial Individualized Goals for Treatment:Katerina verbalizes the following treatment/discharge goals: \"To decrease the fear of each day. Increase my self confidence again. To be able to face the day. Decrease anxiety symptoms\".      See Initial Treatment suggestions for the client during the time between Diagnostic Assessment and completion of the Master Individualized Treatment Plan.    Treatment Goals:     see above     Area of Treatment Focus:  Symptom Management and Develop Socialization / Interpersonal Relationship Skills    Therapeutic Interventions/Treatment Strategies:  Support, Feedback, Structured Activity, Clarification and Education    Response to Treatment Strategies:  Accepted Feedback, Gave Feedback, Listened, Focused on Goals, Attentive, Accepted Support and Alert    Name of Group: Group Psychotherapy    Description and Outcome:  Hour 1: Katerina initially reported that she hasn't been doing anything but lying on the couch. When questioned more directly, it revealed that she has been rather active on a daily basis. Her tremors were not at all apparent today. The group encouraged her to focus more on the things she has been doing, rather than recognizing anything problematic as first statement.     Hour 2: The group focused on ways to cope with and manage anxiety. Members shared what has worked for them in the past, what they are doing now, and possible new skills to try.      Is this a Weekly Review of the Progress on the Treatment Plan?  Yes.        Are Treatment Plan Goals being addressed?  Yes, continue treatment goals          Are Treatment Plan Strategies to Address Goals Effective?  Yes, continue treatment strategies          Are there any current contracts in place?  Yes. Safety  "

## 2017-08-18 NOTE — PROGRESS NOTES
"  Adult Mental Health Outpatient Group Therapy Progress Note     Client Initial Individualized Goals for Treatment:Katerina verbalizes the following treatment/discharge goals: \"To decrease the fear of each day. Increase my self confidence again. To be able to face the day. Decrease anxiety symptoms\".      See Initial Treatment suggestions for the client during the time between Diagnostic Assessment and completion of the Master Individualized Treatment Plan.    Treatment Goals:  . Will plan and problem-solve to return to previous socia, enjoyable activities and decrease lonliness and isolation  Will plan and problem-solve to decrease fears and anxiety and increase self confidence  Client will notify staff when needing assistance to develop or implement a coping plan to manage suicidal or self injurious urges.       Area of Treatment Focus:  Symptom Management and Develop Socialization / Interpersonal Relationship Skills    Therapeutic Interventions/Treatment Strategies:  Support, Feedback, Clarification and Education    Response to Treatment Strategies:  Accepted Feedback, Gave Feedback, Listened, Focused on Goals, Attentive, Accepted Support and Alert    Name of Group:  Mental health management     Description and Outcome:  Group was provided with information/education on authenticity and perfectionism.  Authenticity was described as self validating in contrast to perfectionism which invalidates the self.  Self talk and cognitive distortions were presented as contributing to perfectionistic tendencies.  Group members were encouraged to identify forms of distortions and ways to challenge the self talk.  Katerina describes self as perfectionistic and was insightful into negative self talk.  Accepted feedback.    Is this a Weekly Review of the Progress on the Treatment Plan?  No        "

## 2017-08-22 ENCOUNTER — HOSPITAL ENCOUNTER (OUTPATIENT)
Dept: BEHAVIORAL HEALTH | Facility: CLINIC | Age: 72
End: 2017-08-22
Attending: NURSE PRACTITIONER
Payer: MEDICARE

## 2017-08-22 PROCEDURE — H2012 BEHAV HLTH DAY TREAT, PER HR: HCPCS

## 2017-08-22 NOTE — PROGRESS NOTES
"  Adult Mental Health Outpatient Group Therapy Progress Note     Client Initial Individualized Goals for Treatment:Katerina verbalizes the following treatment/discharge goals: \"To decrease the fear of each day. Increase my self confidence again. To be able to face the day. Decrease anxiety symptoms\".      See Initial Treatment suggestions for the client during the time between Diagnostic Assessment and completion of the Master Individualized Treatment Plan.    Treatment Goals:     see above     Area of Treatment Focus:  Symptom Management and Develop Socialization / Interpersonal Relationship Skills    Therapeutic Interventions/Treatment Strategies:  Support, Feedback, Structured Activity, Clarification and Education    Response to Treatment Strategies:  Accepted Feedback, Gave Feedback, Listened, Focused on Goals, Attentive, Accepted Support and Alert    Name of Group: Psychotherapy    Description and Outcome   Hour 1: Katerina started out sharing how she is spending too much on the couch and is very anxious and tremulous inside. However, she then went on to share how she spent five hours trimming bushes while her  was gone and didn't have any anxiety. She also went shopping at [employer] by herself with no problem and walked for over three miles with her daughter, which is why she laid down on the couch. The group again pointed out how much she has accomplished, which she immediately discounted. A bit later, she did thank the group for helping her to focus on the \"glass being half full\".  Hour 2: The group focused on how they can better focus on the things that are progressing well, rather than on what they feel is \"wrong\" with them. Many group members noted that they are achieving their goals and not even recognizing it, which is a pleasant surprise when it is pointed out.      .Is this a Weekly Review of the Progress on the Treatment Plan?  No  "

## 2017-08-23 NOTE — PROGRESS NOTES
"  Adult Mental Health Outpatient Group Therapy Progress Note     Client Initial Individualized Goals for Treatment:Katerina verbalizes the following treatment/discharge goals: \"To decrease the fear of each day. Increase my self confidence again. To be able to face the day. Decrease anxiety symptoms\".      See Initial Treatment suggestions for the client during the time between Diagnostic Assessment and completion of the Master Individualized Treatment Plan.    Treatment Goals:  . Will plan and problem-solve to return to previous socia, enjoyable activities and decrease lonliness and isolation  Will plan and problem-solve to decrease fears and anxiety and increase self confidence  Client will notify staff when needing assistance to develop or implement a coping plan to manage suicidal or self injurious urges.       Area of Treatment Focus:  Symptom Management and Develop Socialization / Interpersonal Relationship Skills    Therapeutic Interventions/Treatment Strategies:  Support, Feedback, Structured Activity, Clarification and Education    Response to Treatment Strategies:  Accepted Feedback, Gave Feedback, Listened, Focused on Goals, Attentive, Accepted Support and Alert    Name of Group:  Mental health management     Description and Outcome:  Group reviewed concept of authenticity and mindfulness from group on 8/18, focusing on role of curiousity.  Group members were invited to be curious as they focused on their breath and warmup.  Imagery emerged involved a turtle coming out of one's shell and an eagle soaring.  Group members described a sense of freedom and heightened curiousity when soaring.  They were encouraged to attend to the transition between coming out of  One's shell to soaring through the space.  Katerina shared that while exploring being in shell, she felt confined.  Accepted feedback about varying perceptions and encouraged to self validate.  She voiced feeling more free when able to soar and feel " curious.    Is this a Weekly Review of the Progress on the Treatment Plan?  No

## 2017-08-25 ENCOUNTER — HOSPITAL ENCOUNTER (OUTPATIENT)
Dept: BEHAVIORAL HEALTH | Facility: CLINIC | Age: 72
End: 2017-08-25
Attending: NURSE PRACTITIONER
Payer: MEDICARE

## 2017-08-25 PROCEDURE — H2012 BEHAV HLTH DAY TREAT, PER HR: HCPCS

## 2017-08-25 NOTE — PROGRESS NOTES
"  Adult Mental Health Outpatient Group Therapy Progress Note     Client Initial Individualized Goals for Treatment:Katerina verbalizes the following treatment/discharge goals: \"To decrease the fear of each day. Increase my self confidence again. To be able to face the day. Decrease anxiety symptoms\".      See Initial Treatment suggestions for the client during the time between Diagnostic Assessment and completion of the Master Individualized Treatment Plan.    Treatment Goals:  . Will plan and problem-solve to return to previous socia, enjoyable activities and decrease lonliness and isolation  Will plan and problem-solve to decrease fears and anxiety and increase self confidence  Client will notify staff when needing assistance to develop or implement a coping plan to manage suicidal or self injurious urges.     Area of Treatment Focus:  Symptom Management and Develop Socialization / Interpersonal Relationship Skills    Therapeutic Interventions/Treatment Strategies:  Support, Feedback, Structured Activity, Clarification and Education    Response to Treatment Strategies:  Accepted Feedback, Gave Feedback, Listened, Focused on Goals, Attentive, Accepted Support and Alert    Name of Group:  Mental health management 1, mental health management 2     Description and Outcome:  Mental Health Management 1: Ongoing discussion of authenticity and barriers, such as distorted thinking.  Discussion revolved around the development of the authentic self, making intentional choices when values conflict and acceptance that the authentic self may not be who we think it should.  Handout on distorted thinking reviewed.  Clients identified distortions they utilizes and practised refuting the distortion. Katerina identified use of all or nothing distortion.  She offered example of her need to structure day completely to feel productive, or she feels as if she had a wasted day.  Able to identify evidence to refute belief.    Mental Health " Management 2:  Clients were presented with GLAD, a mindful activity focusing on positive thinking.  Clients were encouraged to identify each element of GLAD for the day, gratitude, learning, accomplishment and delight.  Clients were also given option to include something that brought laughter and something that was yielded today. Katerina shared her GLAD with the group, focusing on the delight and laughter she experienced today.    Is this a Weekly Review of the Progress on the Treatment Plan?  No

## 2017-08-25 NOTE — PROGRESS NOTES
"  Adult Mental Health Outpatient Group Therapy Progress Note     Client Initial Individualized Goals for Treatment:Katerina verbalizes the following treatment/discharge goals: \"To decrease the fear of each day. Increase my self confidence again. To be able to face the day. Decrease anxiety symptoms\".      See Initial Treatment suggestions for the client during the time between Diagnostic Assessment and completion of the Master Individualized Treatment Plan.    Treatment Goals:     see above     Area of Treatment Focus:  Symptom Management and Develop Socialization / Interpersonal Relationship Skills    Therapeutic Interventions/Treatment Strategies:  Support, Feedback, Structured Activity, Clarification and Education    Response to Treatment Strategies:  Accepted Feedback, Gave Feedback, Listened, Focused on Goals, Attentive, Accepted Support and Alert    Name of Group: Group Psychotherapy    Description and Outcome:  Hour 1: Katerina shared with the group that she has been staying active at home with cooking and with doing housework. She had also gone on a walk with her daughter and grandchildren. However, she chastised herself for taking a two hour nap. The group encouraged her to give herself a break and that a two hour nap over the course of a week or more is not a big deal. She felt that she was able to do this. She continues to report that she has an\"anxious feeling\" inside of her most of the time. She was able to relax with her  on the porch and have a beer, which felt good to her.  Hour 2: The theme of the group again was geared toward the management of anxiety and how to recognize when symptoms of anxiety are increasing. The group explored ways to identify \"red flags\" for the onset of more more serious anxiety and how to intervene.  Is this a Weekly Review of the Progress on the Treatment Plan?  Yes.        Are Treatment Plan Goals being addressed?  Yes, continue treatment goals          Are Treatment Plan " Strategies to Address Goals Effective?  Yes, continue treatment strategies          Are there any current contracts in place?  Yes. Safety

## 2017-08-29 ENCOUNTER — HOSPITAL ENCOUNTER (OUTPATIENT)
Dept: BEHAVIORAL HEALTH | Facility: CLINIC | Age: 72
End: 2017-08-29
Attending: NURSE PRACTITIONER
Payer: MEDICARE

## 2017-08-29 PROCEDURE — H2012 BEHAV HLTH DAY TREAT, PER HR: HCPCS

## 2017-08-29 NOTE — PROGRESS NOTES
"  Adult Mental Health Outpatient Group Therapy Progress Note     Client Initial Individualized Goals for Treatment:Katerina verbalizes the following treatment/discharge goals: \"To decrease the fear of each day. Increase my self confidence again. To be able to face the day. Decrease anxiety symptoms\".      See Initial Treatment suggestions for the client during the time between Diagnostic Assessment and completion of the Master Individualized Treatment Plan.    Treatment Goals:     see above     Area of Treatment Focus:  Symptom Management and Develop Socialization / Interpersonal Relationship Skills    Therapeutic Interventions/Treatment Strategies:  Support, Feedback, Structured Activity, Clarification and Education    Response to Treatment Strategies:  Accepted Feedback, Gave Feedback, Listened, Focused on Goals, Attentive, Accepted Support and Alert    Name of Group: Psychotherapy    Description and Outcome   Hour 1: Katerina reported to the group that she thinks she is not doing very well and is wondering when she will get better. The group pointed out, yet again, how she is accomplishing a great deal more than when she first started and is, in fact, rather busy. She reported that she did well managing her anxiety while her  was working over the week end by resting on the couch for about 10-15 minutes at a time. She felt that this was wasted time, however.  Hour 2: The group focused on anxiety management and how to focus on what is going right, rather than what is still wrong or what they need help with.      Coping w/ Depression & Anxiety:  The group read and discussed the hand out \"Guidlines for Good Mental Health\". The group members shared what areas they struggle with and shared how they might improve.    .Is this a Weekly Review of the Progress on the Treatment Plan?  No  "

## 2017-08-30 NOTE — PROGRESS NOTES
"   Adult Mental Health Outpatient Group Therapy Progress Note     Client Initial Individualized Goals for Treatment:Katerina verbalizes the following treatment/discharge goals: \"To decrease the fear of each day. Increase my self confidence again. To be able to face the day. Decrease anxiety symptoms\".      See Initial Treatment suggestions for the client during the time between Diagnostic Assessment and completion of the Master Individualized Treatment Plan.    Treatment Goals:  . Will plan and problem-solve to return to previous socia, enjoyable activities and decrease lonliness and isolation  Will plan and problem-solve to decrease fears and anxiety and increase self confidence  Client will notify staff when needing assistance to develop or implement a coping plan to manage suicidal or self injurious urges.     Area of Treatment Focus:  Symptom Management and Develop Socialization / Interpersonal Relationship Skills    Therapeutic Interventions/Treatment Strategies:  Support, Feedback, Structured Activity, Clarification and Education    Response to Treatment Strategies:  Accepted Feedback, Gave Feedback, Listened, Focused on Goals, Attentive, Accepted Support and Alert    Name of Group:  Mental health management     Description and Outcome:  Group began with exploration of grounding and centering through weight shifting.  The exploration of weight shifting developed into image of tight rope.  Group members practised \"walking the tight rope\" and identified it as a metaphor for path to mental health recovery.  Group members took turns walking the rope and identified ways in which they would like to be supported as they made this walk.  Katerina was engaged.  She shared at beginning of group she was feeling \"down\" acknowledging that it was related to ongoing anxiety.  She took risks with asking for group's support in her tight roping journey.    Is this a Weekly Review of the Progress on the Treatment Plan?  No        "

## 2017-09-01 ENCOUNTER — HOSPITAL ENCOUNTER (OUTPATIENT)
Dept: BEHAVIORAL HEALTH | Facility: CLINIC | Age: 72
End: 2017-09-01
Attending: NURSE PRACTITIONER
Payer: MEDICARE

## 2017-09-01 VITALS
HEIGHT: 63 IN | HEART RATE: 75 BPM | SYSTOLIC BLOOD PRESSURE: 118 MMHG | BODY MASS INDEX: 18.78 KG/M2 | DIASTOLIC BLOOD PRESSURE: 49 MMHG | TEMPERATURE: 98.2 F | WEIGHT: 106 LBS

## 2017-09-01 PROCEDURE — H2012 BEHAV HLTH DAY TREAT, PER HR: HCPCS

## 2017-09-01 PROCEDURE — 99214 OFFICE O/P EST MOD 30 MIN: CPT | Performed by: NURSE PRACTITIONER

## 2017-09-01 ASSESSMENT — PAIN SCALES - GENERAL: PAINLEVEL: NO PAIN (0)

## 2017-09-01 NOTE — PROGRESS NOTES
"[facility]   Psychiatric Progress Note        Interim History:   From H&P: Katerina Crump is a 71 year old female referred to the [facility] by herself. This is her second time in the program and she finds it quiet helpful. Mrs. Crump has a history of bipolar disorder, however she is disputing this diagnosis saying that she has never been manic or hypomanic, she is usually depressed and anxious. States she was first diagnosed with depression at age 16, after one of her friends past away. She has been hospitalized twice, once in the 70's and once in 2015 at [facility]. She has never attempted suicide and has not had ECT. Her PCP is Dr. Coleman and her psychiatrist is Dr. Oneal whom she saw in April and will see again July 3rd. Current episode of depression and anxiety started in the spring of 2015 after a sergery to remove squamous cell carcinoma. She became very depressed, worried, unable to sleep, relax, eat or function in any way. She was contemplating suicide. She was hospitalized for 3 weeks at [facility] under Dr. Pereyra. She was discharged on regiment of Depakote, Klonopin Zyprexa and Vistaril. She was relatively stable until March of this year. She was scheduled to have an injection in March for osteoarthritis but does not feel that this has been stressful for her. She can't identify any stressors that may have contributed to her current mental states. Current symptoms include: negative thinking/image, high anxiety, poor sleep and appetite (lost 8 lbs this month), shakiness, racing thoughts, restlessness, fear of being alone, depression, and inability to function. She denies SI, SIB. She is avoiding her family and friends \"I don't want to be seen this way\". Her psychiatrist recently increased Depakote to a 1000 mg a day but she is not feeling much better.  Denies panic attacks, hallucinations, delusions, suicidal and homicidal ideation, self injuries " "behaviors, memory problems, obsessions, compulsions, specific fears, and manic symptoms. She is wondering if she needs to be hospitalised but states she hated the experience and does not want to go to the hospital.     The client's care was discussed with the treatment team during the daily team meeting and/or staff's chart notes were reviewed.  Staff report Katerina has been calm, pleasant, cooperative. She is attending groups and participating appropriately.     Met with client for follow up. Continues to feel very anxious, states nothing worked so far. She is discouraged. Spends her days laying on the couch, does some house chores and go out once in a while. Denies SI, SIB. Depression is moderate. Denies manic and psychotic symptoms. Sleeps 8-9 hours each night. Believes appetite is good \"I always have trouble gaining weight\". Memory and concentration are good, however appear forgetful. She sees her psychiatrist every 2 weeks for med adjustment. We discussed ECT treatment. Discussed medications changes. States she started on Propranolol and is now taking 10 mg, tid. Does not think it is working. Gabapentin was increased to 900 mg, 3 times a day with no effect.   Katerina complains of stomach problems for which she takes medications and drinks soda pop, \"It is not good for me but it helps\".          Medications:   1. Gabapentin 900 mg TID  2. Depakote 750 mg qhs  3. Melatonin 10 mg qhs  4. Zyprexa 2.5 mg qam and 5 mg qhs  5. Klonopin 0.5 mg qhs, prn  6. Propranolol 10, mg, tid       Allergies:     Allergies   Allergen Reactions     Keflex [Cephalexin]      Nausea hard to eat          Labs:   No results found for this or any previous visit (from the past 672 hour(s)).         Psychiatric Examination:                      /49  Pulse 75  Temp 98.2  F (36.8  C) (Oral)  Ht 1.6 m (5' 3\")  Wt 48.1 kg (106 lb)  BMI 18.78 kg/m2    Appearance: well groomed, awake, alert, cooperative, mild distress and thin  Attitude:  " cooperative  Eye Contact:  good  Mood:  anxious  Affect:  mood congruent  Speech:  clear, coherent  Psychomotor Behavior:  no evidence of tardive dyskinesia, dystonia, or tics  Throught Process:  logical and goal oriented  Associations:  no loose associations  Thought Content:  no evidence of suicidal ideation or homicidal ideation, no auditory hallucinations present and no visual hallucinations present  Insight:  fair  Judgement:  fair  Oriented to:  time, person, and place  Attention Span and Concentration:  intact  Recent and Remote Memory:  fair           DIagnoses:   1. Bipolar Effective Disorder  2. Generalized Anxiety Disorder, recurent, severe         Plan:   1. Continue [facility].   2. Continue current medications. I recommend obtaining Depakote level and adjust dose accordingly. Also switch from Depakote qhs to TID.  Check thyroid function; hyperthyroidism may cause high anxiety/agitation. Optimize Gabapentin. May use small doses 12.5-25 mg of Seroquel for anxiety and sleep. Klonopin may need to be increased. Seeing a therapist on weekly basis will be beneficial. ECT might be a good option.   3. The patient was encourage to follow up with PCP and Psychiatrist.   4. The patient was advised to let us know if inpatient admission or further help is needed.  5. The patient was advised to get involved in the community in order to continues to improve. Care was coordinated with the treatment team.     Celso GURROLA CNP  Date: 09/01/17  Time: 1:40 PM

## 2017-09-01 NOTE — PROGRESS NOTES
"Group Therapy Progress Notes     Area of Treatment Focus:  Symptom Management, Abstinence/Relapse Prevention and Develop / Improve Independent Living Skills    Therapeutic Interventions/Treatment Strategies:  Support, Feedback, Clarification and Education    Response to Treatment Strategies:  Accepted Feedback, Listened, Attentive, Accepted Support and Alert    Name of Group:  Coping with Depression and Anxiety     Progress Note  Esther participated in a group focussed on the benefit of maintaining awareness of progress during recovery. Initially, esther stated that she did not really see much progress since she first came to the program. She said she was spending a lot of time on the couch then and she is still doing that. Peers and therapist gave her feedback about progress they observed. This included some decrease in anxiety as evidenced by her being able to sit through the groups more comfortably vs. being too restless and anxious to get through. Also her tremors have stopped, she is smiling more and showing humor, is more social with peers, and is more able to \"push\" herself to be active. Esther was able to agree then that she has made some progress. Looked at how that knowledge can give her hope and motivation going forward.          Is this a Weekly Review of the Progress on the Treatment Plan?  No     "

## 2017-09-01 NOTE — PROGRESS NOTES
"  Adult Mental Health Outpatient Group Therapy Progress Note     Client Initial Individualized Goals for Treatment:Katerina verbalizes the following treatment/discharge goals: \"To decrease the fear of each day. Increase my self confidence again. To be able to face the day. Decrease anxiety symptoms\".      See Initial Treatment suggestions for the client during the time between Diagnostic Assessment and completion of the Master Individualized Treatment Plan.    Treatment Goals:     see above     Area of Treatment Focus:  Symptom Management and Develop Socialization / Interpersonal Relationship Skills    Therapeutic Interventions/Treatment Strategies:  Support, Feedback, Structured Activity, Clarification and Education    Response to Treatment Strategies:  Accepted Feedback, Gave Feedback, Listened, Focused on Goals, Attentive, Accepted Support and Alert    Name of Group: Group Psychotherapy    Description and Outcome:  Hour 1: Katerina shared with the group that she attended a  this morning with her . The event went well, but she is worried about what will happen after she goes home. She is worrying about some events that have been in place for the weekend and she fears that she will not be able to deal with them. Katerina was encouraged to just try and stay in the present and realize that she is ok. It was pointed out that the more she ruminates on what might happen, the more anxious she becomes and almost \"plans for it\" to be difficult for her.  Hour 2: A large focus of the first hour was on the upcoming holiday weekend. The group discussed ways that they would be taking care of themselves and what they could do to have some more fun in their life, particularly over this holiday weekend.       Is this a Weekly Review of the Progress on the Treatment Plan?  Yes.        Are Treatment Plan Goals being addressed?  Yes, continue treatment goals          Are Treatment Plan Strategies to Address Goals " Effective?  Yes, continue treatment strategies          Are there any current contracts in place?  Yes. Safety

## 2017-09-05 ENCOUNTER — HOSPITAL ENCOUNTER (OUTPATIENT)
Dept: BEHAVIORAL HEALTH | Facility: CLINIC | Age: 72
End: 2017-09-05
Attending: NURSE PRACTITIONER
Payer: MEDICARE

## 2017-09-05 PROCEDURE — H2012 BEHAV HLTH DAY TREAT, PER HR: HCPCS

## 2017-09-05 NOTE — PROGRESS NOTES
"  Adult Mental Health Outpatient Group Therapy Progress Note     Client Initial Individualized Goals for Treatment:Katerina verbalizes the following treatment/discharge goals: \"To decrease the fear of each day. Increase my self confidence again. To be able to face the day. Decrease anxiety symptoms\".      See Initial Treatment suggestions for the client during the time between Diagnostic Assessment and completion of the Master Individualized Treatment Plan.    Treatment Goals:     see above     Area of Treatment Focus:  Symptom Management and Develop Socialization / Interpersonal Relationship Skills    Therapeutic Interventions/Treatment Strategies:  Support, Feedback, Structured Activity, Clarification and Education    Response to Treatment Strategies:  Accepted Feedback, Gave Feedback, Listened, Focused on Goals, Attentive, Accepted Support and Alert    Name of Group: Group Psychotherapy    Description and Outcome   Hour 1: Katerina reported to the group that she was able to go and help her friends pack to move for about four hours on Saturday. She initially thought she would only be able to do that for ninety minutes, so she was pleasantly surprised. Her  was very supportive of her, as well. After that she was able to go grocery shopping after taking a short ten minute rest to calm herself. She also reported that she and her  babysat for a three year old on Sunday and Monday. She enjoyed this, but also needed to take some short rests to calm herself, but do so effectively.   Hour 2: The group explored the topic of how to find meaningful activities to structure their time, even if they are not ready or wanting to go back to work.     .Is this a Weekly Review of the Progress on the Treatment Plan?  No  "

## 2017-09-05 NOTE — PROGRESS NOTES
"  Adult Mental Health Outpatient Group Therapy Progress Note     Client Initial Individualized Goals for Treatment:Katerina verbalizes the following treatment/discharge goals: \"To decrease the fear of each day. Increase my self confidence again. To be able to face the day. Decrease anxiety symptoms\".      See Initial Treatment suggestions for the client during the time between Diagnostic Assessment and completion of the Master Individualized Treatment Plan.    Treatment Goals:  . Will plan and problem-solve to return to previous socia, enjoyable activities and decrease lonliness and isolation  Will plan and problem-solve to decrease fears and anxiety and increase self confidence  Client will notify staff when needing assistance to develop or implement a coping plan to manage suicidal or self injurious urges.     Area of Treatment Focus:  Symptom Management and Develop Socialization / Interpersonal Relationship Skills    Therapeutic Interventions/Treatment Strategies:  Support, Feedback, Structured Activity, Clarification and Education    Response to Treatment Strategies:  Accepted Feedback, Gave Feedback, Listened, Focused on Goals, Attentive, Accepted Support and Alert    Name of Group:  Mental health management     Description and Outcome:  Group shared that they had addressed topic of hope in previous group with , sharing that they had discovered that hope resides in their heart, and for some their head.  The group then explored leading or initiating with an open, hopeful heart as contrasted to closed heart.  They noticed that moving with open heart was more difficult but that they could begin to sense difference between closed heart and hopeful heart.  The group had also expressed wish to \"wake up\".  The group contributed movements to the Beach [employer] and were encouraged to expand or vary in some way the movements they had lead.  Group members noted a relationship between hope and laughter.  Katerina was engaged " and active in experiential.  She reports difficulty experiencing hope in her body, but with practise acknowledged a start.    Is this a Weekly Review of the Progress on the Treatment Plan?  No

## 2017-09-07 NOTE — PROGRESS NOTES
"  Adult Mental Health Outpatient Group Therapy Progress Note     Client Initial Individualized Goals for Treatment:Katerina verbalizes the following treatment/discharge goals: \"To decrease the fear of each day. Increase my self confidence again. To be able to face the day. Decrease anxiety symptoms\".        See Initial Treatment suggestions for the client during the time between Diagnostic Assessment and completion of the Master Individualized Treatment Plan.     Treatment Goals:  1. Will plan and problem-solve to return to previous socia, enjoyable activities and decrease lonliness and isolation    2. Will plan and problem-solve to decrease fears and anxiety and increase self confidence    3. Client will notify staff when needing assistance to develop or implement a coping plan to manage suicidal or self injurious urges.     Area of Treatment Focus:  Symptom Management, Develop / Improve Independent Living Skills and Develop Socialization / Interpersonal Relationship Skills    Therapeutic Interventions/Treatment Strategies:  Support, Feedback, Safety Assessments, Structured Activity and Problem Solving    Response to Treatment Strategies:  Accepted Feedback, Gave Feedback, Listened, Focused on Goals, Attentive and Accepted Support    Name of Group:  Mental Health Management     Description and Outcome:  Client attended and participated in a life skills group session where intervention focused on functional improvements in social skills and communication with others. Client had a constricted affect in session. She was meeting with program staff at beginning of session and joined in activity after it started. Initially, client had difficulty learning novel activity. With repeated directions and support from peers, client was able to transition into activity and participate fully. Client would benefit from additional opportunities to practice and implement content from this session. Client addressed ITP goal number 2 in " this session.    Is this a Weekly Review of the Progress on the Treatment Plan?  No

## 2017-09-08 ENCOUNTER — HOSPITAL ENCOUNTER (OUTPATIENT)
Dept: BEHAVIORAL HEALTH | Facility: CLINIC | Age: 72
End: 2017-09-08
Attending: NURSE PRACTITIONER
Payer: MEDICARE

## 2017-09-08 PROCEDURE — H2012 BEHAV HLTH DAY TREAT, PER HR: HCPCS

## 2017-09-08 NOTE — PROGRESS NOTES
Group Therapy Progress Notes      Area of Treatment Focus:  Symptom Management, Develop / Improve Independent Living Skills and Develop Socialization / Interpersonal Relationship Skills     Therapeutic Interventions/Treatment Strategies:  Support, Feedback, Structured Activity, Education and Cognitive Behavioral Therapy     Response to Treatment Strategies:  Accepted Feedback, Gave Feedback, Listened, Focused on Goals, Attentive, Accepted Support and Alert     Name of Group:  Coping with Depression group on Self-Compassion     Progress Note  Katerina participated in a Coping with Depression group on Self-Compassion. She shared examples and offered support to peers with prompting. She verbalized understanding of the concepts.              Is this a Weekly Review of the Progress on the Treatment Plan?  No

## 2017-09-08 NOTE — PROGRESS NOTES
"Group Therapy Progress Notes     Area of Treatment Focus:  Symptom Management, Community Resources/Discharge Planning, Abstinence/Relapse Prevention, Develop / Improve Independent Living Skills and Develop Socialization / Interpersonal Relationship Skills    Therapeutic Interventions/Treatment Strategies:  Support, Feedback, Clarification and Education    Response to Treatment Strategies:  Accepted Feedback, Gave Feedback, Listened, Focused on Goals, Attentive, Accepted Support and Alert    Name of Group:  Psychotherapy, Mental Health Management     Progress Note  In Psychotherapy Group 1, Katerina reports on her activities over the past two days. Wed, she walked with her daughter, rested for one hour, went out to dinner with friends. She was pleased that she was able to be with people for about four hours. On Thursday, she had three doctor's appointments, rested for one hour, then went to her grandson's soccer game. She was able to reports on the game and the players vs only noticing how anxious she was to be there. Still, Katerina says she believes she is the same as when she started the program. Others gave her contrasting feedback. Katerina said she could believe what others said but did not feel it herself.     In Group 2, Katerina participated in the theme of Group 1 by talking about how to cope with the feeling that she has not changed much. Looked at how journaling about how she feels each day and about what she has done each day would give her information about progress. Also talking to group members and therapist as well as family could give her some objective information even when she subjectively feels no better. Also talked about distraction as a way to get her mind off the negative thoughts about never getting better.    In MHM group, Katerina participated in looking at a grid laying out different stages of recovery. She located herself as in an early mid stage of recovery. She says she does not feel \"out of the " "woods\" yet but symptoms have lost some intensity and she can tolerate being alone at times now. She has some good days. Looked at things she could do to try to move herself forward as  has told her that medication is at the right amounts and will not be changed right now. One thing would be to try to return to or start some new activities at [employer]. She had been volunteering but it is only sporadic involvement for the committees she is on.          Is this a Weekly Review of the Progress on the Treatment Plan?  Yes.      Are Treatment Plan Goals being addressed?  Yes, continue treatment goals      Are Treatment Plan Strategies to Address Goals Effective?  Yes, continue treatment strategies      Are there any current contracts in place?  Yes. Behavioral           "

## 2017-09-12 ENCOUNTER — HOSPITAL ENCOUNTER (OUTPATIENT)
Dept: BEHAVIORAL HEALTH | Facility: CLINIC | Age: 72
End: 2017-09-12
Attending: NURSE PRACTITIONER
Payer: MEDICARE

## 2017-09-12 PROCEDURE — H2012 BEHAV HLTH DAY TREAT, PER HR: HCPCS

## 2017-09-12 NOTE — PROGRESS NOTES
"  Adult Mental Health Outpatient Group Therapy Progress Note     Client Initial Individualized Goals for Treatment:Katerina verbalizes the following treatment/discharge goals: \"To decrease the fear of each day. Increase my self confidence again. To be able to face the day. Decrease anxiety symptoms\".      See Initial Treatment suggestions for the client during the time between Diagnostic Assessment and completion of the Master Individualized Treatment Plan.    Treatment Goals:  . Will plan and problem-solve to return to previous socia, enjoyable activities and decrease lonliness and isolation  Will plan and problem-solve to decrease fears and anxiety and increase self confidence  Client will notify staff when needing assistance to develop or implement a coping plan to manage suicidal or self injurious urges.     Area of Treatment Focus:  Symptom Management and Develop Socialization / Interpersonal Relationship Skills    Therapeutic Interventions/Treatment Strategies:  Support, Feedback, Structured Activity, Clarification and Education    Response to Treatment Strategies:  Accepted Feedback, Gave Feedback, Listened, Focused on Goals, Attentive, Accepted Support and Alert    Name of Group:  Mental  Health management     Description and Outcome:  Group was provided with background information on the relationship between mind, body and thought.  Members were reminded of their innate to breathe and challenged to allow and trust themselves to breathe as they know how.  After an initial warmup, a theme developed around \"reaching forward.\"  Once standing the group practised taking steps forward to get closer to each other and to notice or be curious about the ways in which they chose to move forward.  Katerina was actively engaged.  She shared that she notices that she pays more attention to breathe while in this group.    Is this a Weekly Review of the Progress on the Treatment Plan?  No        "

## 2017-09-12 NOTE — PROGRESS NOTES
"  Adult Mental Health Outpatient Group Therapy Progress Note     Client Initial Individualized Goals for Treatment:Katerina verbalizes the following treatment/discharge goals: \"To decrease the fear of each day. Increase my self confidence again. To be able to face the day. Decrease anxiety symptoms\".      See Initial Treatment suggestions for the client during the time between Diagnostic Assessment and completion of the Master Individualized Treatment Plan.    Treatment Goals:     see above     Area of Treatment Focus:  Symptom Management and Develop Socialization / Interpersonal Relationship Skills    Therapeutic Interventions/Treatment Strategies:  Support, Feedback, Structured Activity, Clarification and Education    Response to Treatment Strategies:  Accepted Feedback, Gave Feedback, Listened, Focused on Goals, Attentive, Accepted Support and Alert    Name of Group: Group Psychotherapy    Description and Outcome   Hour 1:  Katerina reported to the group that she had a rather uneventful weekend. Her  did yardwork and she tended to stay in the house. She indicated that she had to lie down frequently to calm down her anxiety. She did go for a walk with her daughter and did the cooking. She is going to try and make some arrangements to get together with some friends for lunch this week to get out of the house.  Hour 2:  The group shared their thoughts on the recurring theme of relapse prevention and how to \"fight through\" doing those things that they know are helpful even when they don't feel like it.        .Is this a Weekly Review of the Progress on the Treatment Plan?  No  "

## 2017-09-15 ENCOUNTER — HOSPITAL ENCOUNTER (OUTPATIENT)
Dept: BEHAVIORAL HEALTH | Facility: CLINIC | Age: 72
End: 2017-09-15
Attending: NURSE PRACTITIONER
Payer: MEDICARE

## 2017-09-15 PROCEDURE — H2012 BEHAV HLTH DAY TREAT, PER HR: HCPCS

## 2017-09-15 NOTE — PROGRESS NOTES
"  Adult Mental Health Outpatient Group Therapy Progress Note     Client Initial Individualized Goals for Treatment:Katerina verbalizes the following treatment/discharge goals: \"To decrease the fear of each day. Increase my self confidence again. To be able to face the day. Decrease anxiety symptoms\".      See Initial Treatment suggestions for the client during the time between Diagnostic Assessment and completion of the Master Individualized Treatment Plan.    Treatment Goals:     see above     Area of Treatment Focus:  Symptom Management and Develop Socialization / Interpersonal Relationship Skills    Therapeutic Interventions/Treatment Strategies:  Support, Feedback, Structured Activity, Clarification and Education    Response to Treatment Strategies:  Accepted Feedback, Gave Feedback, Listened, Focused on Goals, Attentive, Accepted Support and Alert    Name of Group: Group Psychotherapy    Description and Outcome:  Hour 1: Katerina reported to the group that she is remaining anxious \"inside\" and that mornings are very difficult for her. She also reported that she has been somewhat busier than usual and was happy with that. Her affect has appeared brighter and she is laughing and joking more. When pointed out to her, she agrees that she may be feeling a little better, but still not great.  Hour 2: The group reflected upon and discussed the common theme of being alone. Some people are terrified of being alone, physically. Others shared their feelings about being alone emotionally and how painful it is when you are depressed or ill.       Is this a Weekly Review of the Progress on the Treatment Plan?  Yes.        Are Treatment Plan Goals being addressed?  Yes, continue treatment goals          Are Treatment Plan Strategies to Address Goals Effective?  Yes, continue treatment strategies          Are there any current contracts in place?  Yes. Safety  "

## 2017-09-15 NOTE — PROGRESS NOTES
Group Therapy Progress Notes     Area of Treatment Focus:  Symptom Management    Therapeutic Interventions/Treatment Strategies:  Support, Feedback, Structured Activity, Problem Solving and Clarification    Response to Treatment Strategies:  Accepted Feedback, Gave Feedback, Listened, Focused on Goals, Attentive, Accepted Support and Alert    Name of Group:  Coping with Depression    Progress Note  Client learned techniques to increase positive thinking and reduce negative thinking. Katerina identified specific ways that she will begin this practice.          Is this a Weekly Review of the Progress on the Treatment Plan?  No

## 2017-09-15 NOTE — PROGRESS NOTES
"Group Therapy Progress Notes     Area of Treatment Focus:  Symptom Management, Community Resources/Discharge Planning, Abstinence/Relapse Prevention, Develop / Improve Independent Living Skills and Develop Socialization / Interpersonal Relationship Skills    Therapeutic Interventions/Treatment Strategies:  Support, Feedback, Clarification and Education    Response to Treatment Strategies:  Accepted Feedback, Listened, Focused on Goals, Attentive, Accepted Support and Alert    Name of Group:  Mental Health Management     Progress Note  Katerina participated in a group on the topic of \"hidden losses\" and how to cope with them. Katerina says she feels alone. She is isolated and says she doesn't understand how she can change this. She wants to go back to before when she didn't have depression or have to take medication. She relies on her  to ease this feeling of aloneness but when he is gone she has a difficult time now. She has a hard time identifying how she would want her life to be now to deal with the loneliness. Peer and therapist reminded her of recent times when she was able to overcome low motivation and do something she wanted to do.        Is this a Weekly Review of the Progress on the Treatment Plan?  Yes.      Are Treatment Plan Goals being addressed?  Yes, continue treatment goals      Are Treatment Plan Strategies to Address Goals Effective?  Yes, continue treatment strategies      Are there any current contracts in place?  Yes. Behavioral           "

## 2017-09-19 ENCOUNTER — HOSPITAL ENCOUNTER (OUTPATIENT)
Dept: BEHAVIORAL HEALTH | Facility: CLINIC | Age: 72
End: 2017-09-19
Attending: NURSE PRACTITIONER
Payer: MEDICARE

## 2017-09-19 PROCEDURE — H2012 BEHAV HLTH DAY TREAT, PER HR: HCPCS

## 2017-09-19 NOTE — PROGRESS NOTES
"  Adult Mental Health Outpatient Group Therapy Progress Note     Client Initial Individualized Goals for Treatment:Katerina verbalizes the following treatment/discharge goals: \"To decrease the fear of each day. Increase my self confidence again. To be able to face the day. Decrease anxiety symptoms\".      See Initial Treatment suggestions for the client during the time between Diagnostic Assessment and completion of the Master Individualized Treatment Plan.    Treatment Goals:     see above     Area of Treatment Focus:  Symptom Management and Develop Socialization / Interpersonal Relationship Skills    Therapeutic Interventions/Treatment Strategies:  Support, Feedback, Structured Activity, Clarification and Education    Response to Treatment Strategies:  Accepted Feedback, Gave Feedback, Listened, Focused on Goals, Attentive, Accepted Support and Alert    Name of Group: Group Psychotherapy    Description and Outcome   Hour 1: Katerina shared with the group that she once again feels like she has taken too much time \"on the couch\". In reality, she spends possibly 20 minutes and it is to calm herself down. She agrees that she will \"give herself permission\" to use this as a skill to calm down. She is more socially engaged, but tends to minimize this.  Hour 2: The group discussed the theme of events in the past or patterns of behavior are important to acknowledge. It is useful to use this information as a way to prepare for any possible strong reactions that might occur and deal with them more effectively.       .Is this a Weekly Review of the Progress on the Treatment Plan?  No  "

## 2017-09-19 NOTE — PROGRESS NOTES
"Adult Mental Health Outpatient Group Therapy Progress Note     Client Initial Individualized Goals for Treatment: \"To decrease the fear of each day. Increase my self confidence again. To be able to face the day. Decrease anxiety symptoms\".    See Initial Treatment suggestions for the client during the time between Diagnostic Assessment and completion of the Master Individualized Treatment Plan.    Treatment Goals:     See above     Area of Treatment Focus:  Symptom Management and Develop Socialization / Interpersonal Relationship Skills    Therapeutic Interventions/Treatment Strategies:  Support, Feedback and Education    Response to Treatment Strategies:  Accepted Feedback, Listened and Attentive    Name of Group:  Mental Health Management     Description and Outcome:   Facilitator taught and led discussion on dialectics which is about identifying extremes in thought that might be unhelpful and trying to work towards a more moderate point of view.  Discussed how to create more options when we feel stuck with too few or how to narrow down options when we feel we have to many.  Within this discussion, discussed how to identify when we are being willful or stuck and how we might move towards being willing to explore less extreme options in thought and action.  Client appeared attentive and engaged.    Client would benefit from additional opportunities to practice and implement content from this session.    Is this a Weekly Review of the Progress on the Treatment Plan?  No    "

## 2017-09-20 NOTE — PROGRESS NOTES
"  Adult Mental Health Outpatient Group Therapy Progress Note     Client Initial Individualized Goals for Treatment:Katerina verbalizes the following treatment/discharge goals: \"To decrease the fear of each day. Increase my self confidence again. To be able to face the day. Decrease anxiety symptoms\".      See Initial Treatment suggestions for the client during the time between Diagnostic Assessment and completion of the Master Individualized Treatment Plan.    Treatment Goals:  . Will plan and problem-solve to return to previous socia, enjoyable activities and decrease lonliness and isolation  Will plan and problem-solve to decrease fears and anxiety and increase self confidence  Client will notify staff when needing assistance to develop or implement a coping plan to manage suicidal or self injurious urges.     Area of Treatment Focus:  Symptom Management and Develop Socialization / Interpersonal Relationship Skills    Therapeutic Interventions/Treatment Strategies:  Support, Feedback, Clarification and Education    Response to Treatment Strategies:  Accepted Feedback, Gave Feedback, Listened, Focused on Goals, Attentive, Accepted Support and Alert    Name of Group:  Mental  Health management     Description and Outcome:  Group began with guided breathing warmup and continued into warming up of individual body parts as well as whole self.  The group continued to develop theme from last week around taking steps toward connection with other. The group used both eye contact and shaking of hands to explore connection.  Katerina was engaged.  She shared that she felt less anxious at end of group.  Was able to distract self and stay in the moment while moving and focusing on her connection with others.    Is this a Weekly Review of the Progress on the Treatment Plan?  No        "

## 2017-09-22 ENCOUNTER — HOSPITAL ENCOUNTER (OUTPATIENT)
Dept: BEHAVIORAL HEALTH | Facility: CLINIC | Age: 72
End: 2017-09-22
Attending: NURSE PRACTITIONER
Payer: MEDICARE

## 2017-09-22 PROCEDURE — H2012 BEHAV HLTH DAY TREAT, PER HR: HCPCS

## 2017-09-22 NOTE — PROGRESS NOTES
"  Adult Mental Health Outpatient Group Therapy Progress Note     Client Initial Individualized Goals for Treatment:Katerina verbalizes the following treatment/discharge goals: \"To decrease the fear of each day. Increase my self confidence again. To be able to face the day. Decrease anxiety symptoms\".      See Initial Treatment suggestions for the client during the time between Diagnostic Assessment and completion of the Master Individualized Treatment Plan.    Treatment Goals:  . Will plan and problem-solve to return to previous socia, enjoyable activities and decrease lonliness and isolation  Will plan and problem-solve to decrease fears and anxiety and increase self confidence  Client will notify staff when needing assistance to develop or implement a coping plan to manage suicidal or self injurious urges.     Area of Treatment Focus:  Symptom Management and Develop Socialization / Interpersonal Relationship Skills    Therapeutic Interventions/Treatment Strategies:  Support, Feedback, Structured Activity, Clarification and Education    Response to Treatment Strategies:  Accepted Feedback, Gave Feedback, Listened, Focused on Goals, Attentive, Accepted Support and Alert    Name of Group:  Mental health management     Description and Outcome:  The group participated in a structured, psychoeducational discussion and activity comparing and contrasting self esteem and self compassion.  Self esteem was presented as a useful, but limiting construct, as if is prone to change with circumstances and relies on comparisons to others.  On the other hand, self compassion can be described as breathing the self kindly, especially during struggle.  Self compassion facilitates connection to others.  Handouts, including exercises to practise self compassion, were given. Katerina was engaged and participated in discussion.  She shared that she struggles with self compassion and perfectionism.    Is this a Weekly Review of the Progress on " the Treatment Plan?  No

## 2017-09-22 NOTE — PROGRESS NOTES
"  Adult Mental Health Outpatient Group Therapy Progress Note     Client Initial Individualized Goals for Treatment:Katerina verbalizes the following treatment/discharge goals: \"To decrease the fear of each day. Increase my self confidence again. To be able to face the day. Decrease anxiety symptoms\".      See Initial Treatment suggestions for the client during the time between Diagnostic Assessment and completion of the Master Individualized Treatment Plan.    Treatment Goals:     see above     Area of Treatment Focus:  Symptom Management and Develop Socialization / Interpersonal Relationship Skills    Therapeutic Interventions/Treatment Strategies:  Support, Feedback, Structured Activity, Clarification and Education    Response to Treatment Strategies:  Accepted Feedback, Gave Feedback, Listened, Focused on Goals, Attentive, Accepted Support and Alert    Name of Group: Group Psychotherapy    Description and Outcome:  Hour 1: Katerina shared with the group that she had a great day on Wednesday and wasn't sure why it was so different. Thursday, she felt like she was back in the same depressed state. She was strongly encouraged to try and repeat whatever it was she doing to improve her mood. She stated that she thought having things scheduled and not ruminating about them was the most effective piece of the equation.   Hour 2: The group discussed the feeling of apprehension around feeling good because of the possibility of relapse. We discussed how to stay in the moment and set up an expectation of failure.    Is this a Weekly Review of the Progress on the Treatment Plan?  Yes.        Are Treatment Plan Goals being addressed?  Yes, continue treatment goals          Are Treatment Plan Strategies to Address Goals Effective?  Yes, continue treatment strategies          Are there any current contracts in place?  Yes. Safety  "

## 2017-09-26 ENCOUNTER — HOSPITAL ENCOUNTER (OUTPATIENT)
Dept: BEHAVIORAL HEALTH | Facility: CLINIC | Age: 72
End: 2017-09-26
Attending: NURSE PRACTITIONER
Payer: MEDICARE

## 2017-09-26 PROCEDURE — H2012 BEHAV HLTH DAY TREAT, PER HR: HCPCS

## 2017-09-26 NOTE — PROGRESS NOTES
"  Adult Mental Health Outpatient Group Therapy Progress Note     Client Initial Individualized Goals for Treatment:Katerina verbalizes the following treatment/discharge goals: \"To decrease the fear of each day. Increase my self confidence again. To be able to face the day. Decrease anxiety symptoms\".      See Initial Treatment suggestions for the client during the time between Diagnostic Assessment and completion of the Master Individualized Treatment Plan.    Treatment Goals:     see above     Area of Treatment Focus:  Symptom Management and Develop Socialization / Interpersonal Relationship Skills    Therapeutic Interventions/Treatment Strategies:  Support, Feedback, Structured Activity, Clarification and Education    Response to Treatment Strategies:  Accepted Feedback, Gave Feedback, Listened, Focused on Goals, Attentive, Accepted Support and Alert    Name of Group: Group Psychotherapy    Description and Outcome   Hour 1: Katerina shared with the group that she had a fairly decent weekend, though she couldn't recall all that she did. She was quick to add that yesterday was terrible. She had a very early doctor appointment and she stated that it through her entire day off. She indicated that her hands were tremulous throughout the entire day, she laid on the couch for two hours and rested in the afternoon. Later that evening, she and her  went out for dinner with another couple and that seemed to go well even though she wanted to go home after about an hour and half. This didn't seem unreasonable given the day she described.  Hour 2: The group discussed their shared feelings of frustration at what they perceive as lack of progress in dealing with depression. There is a feeling of \"being stuck\" that seems to make it difficult to make decisions. They are, though, making process and were able to identify it in each other, but not see it in themself.     .Is this a Weekly Review of the Progress on the Treatment " Plan?  No

## 2017-09-26 NOTE — PROGRESS NOTES
"  Adult Mental Health Outpatient Group Therapy Progress Note     Client Initial Individualized Goals for Treatment:Katerina verbalizes the following treatment/discharge goals: \"To decrease the fear of each day. Increase my self confidence again. To be able to face the day. Decrease anxiety symptoms\".        See Initial Treatment suggestions for the client during the time between Diagnostic Assessment and completion of the Master Individualized Treatment Plan.     Treatment Goals:  1. Will plan and problem-solve to return to previous socia, enjoyable activities and decrease lonliness and isolation    2. Will plan and problem-solve to decrease fears and anxiety and increase self confidence    3. Client will notify staff when needing assistance to develop or implement a coping plan to manage suicidal or self injurious urges.     Area of Treatment Focus:  Symptom Management, Develop / Improve Independent Living Skills and Develop Socialization / Interpersonal Relationship Skills    Therapeutic Interventions/Treatment Strategies:  Support, Feedback, Safety Assessments, Structured Activity and Problem Solving    Response to Treatment Strategies:  Accepted Feedback, Gave Feedback, Listened, Focused on Goals, Attentive and Accepted Support    Name of Group:  Coping with Depression    Description and Outcome:  Pt attended and participated in a structured life skills group session where intervention focused on sensory system education, strategies to modulate arousal level, and coping through the senses for improved functioning in daily routines and other meaningful tasks. Client had a calm, even affect in session. Actively participated in structured activity. Engaged in some negative self talk regarding her memory and ability to learn new material. Discussed compensatory strategies to use (visual cues). Client verbalized understanding of session content by identifying helpful sensory inputs for herself. Client addressed ITP goal " number 2 in this session.     Is this a Weekly Review of the Progress on the Treatment Plan?  No

## 2017-09-26 NOTE — PROGRESS NOTES
"  Adult Mental Health Outpatient Group Therapy Progress Note     Client Initial Individualized Goals for Treatment:Katerina verbalizes the following treatment/discharge goals: \"To decrease the fear of each day. Increase my self confidence again. To be able to face the day. Decrease anxiety symptoms\".      See Initial Treatment suggestions for the client during the time between Diagnostic Assessment and completion of the Master Individualized Treatment Plan.    Treatment Goals:  . Will plan and problem-solve to return to previous socia, enjoyable activities and decrease lonliness and isolation  Will plan and problem-solve to decrease fears and anxiety and increase self confidence  Client will notify staff when needing assistance to develop or implement a coping plan to manage suicidal or self injurious urges.     Area of Treatment Focus:  Symptom Management and Develop Socialization / Interpersonal Relationship Skills    Therapeutic Interventions/Treatment Strategies:  Support, Feedback, Structured Activity, Clarification and Education    Response to Treatment Strategies:  Accepted Feedback, Gave Feedback, Listened, Focused on Goals, Attentive, Accepted Support and Alert    Name of Group:  Mental health management     Description and Outcome:  Group began with guided breathe focusing on body awareness.  The group identified stiffness that was related to sitting much of the day.  The group then shifted to standing and continued stretches to loosen as well as utilize center to ground.  The group was provided with direction to explore a range of movement efforts, as described by Danial Rios, with the goal of identifying movement preferences (habits, self awareness) and explore alternatives (risk taking, change).  Katerina was engaged and identified punch as her preference today ( difficulty with use of strength).      Is this a Weekly Review of the Progress on the Treatment Plan?  No        "

## 2017-09-26 NOTE — PROGRESS NOTES
Group Therapy Progress Notes     Area of Treatment Focus:  Symptom Management    Therapeutic Interventions/Treatment Strategies:  Support, Feedback, Structured Activity, Problem Solving and Clarification    Response to Treatment Strategies:  Accepted Feedback, Gave Feedback, Listened, Focused on Goals, Attentive, Accepted Support and Alert    Name of Group:  Coping with Depression     Progress Note  Client participated in a follow-up discussion about dysfunctional beliefs and ways these beliefs increase anxiety and depression. Client also participated in a discussion about identifying those areas that you are most proud of in your life.           Is this a Weekly Review of the Progress on the Treatment Plan?  No

## 2017-09-28 NOTE — PROGRESS NOTES
Psychiatry staffing: case discussed  Diagnosis: Bipolar disorder. Doing well. No change.     Current Outpatient Prescriptions   Medication     OLANZapine (ZYPREXA PO)     GABAPENTIN PO     OLANZapine (ZYPREXA PO)     Divalproex Sodium (DEPAKOTE PO)     MELATONIN PO     clonazePAM (KLONOPIN) 0.5 MG tablet     multivitamin, therapeutic (THERA-VIT) TABS     calcium carbonate (OS- MG [place]. [place]) 500 MG tablet     No current facility-administered medications for this encounter.      Past Medical History:   Diagnosis Date     Arthritis     oesteoporosis and OA-hands     Cancer (H) 2015    skin     Depressive disorder     Bipolar

## 2017-09-29 ENCOUNTER — HOSPITAL ENCOUNTER (OUTPATIENT)
Dept: BEHAVIORAL HEALTH | Facility: CLINIC | Age: 72
End: 2017-09-29
Attending: NURSE PRACTITIONER
Payer: MEDICARE

## 2017-09-29 PROCEDURE — H2012 BEHAV HLTH DAY TREAT, PER HR: HCPCS

## 2017-09-29 NOTE — PROGRESS NOTES
"  Adult Mental Health Outpatient Group Therapy Progress Note     Client Initial Individualized Goals for Treatment:Katerina verbalizes the following treatment/discharge goals: \"To decrease the fear of each day. Increase my self confidence again. To be able to face the day. Decrease anxiety symptoms\".      See Initial Treatment suggestions for the client during the time between Diagnostic Assessment and completion of the Master Individualized Treatment Plan.    Treatment Goals:     see above     Area of Treatment Focus:  Symptom Management and Develop Socialization / Interpersonal Relationship Skills    Therapeutic Interventions/Treatment Strategies:  Support, Feedback, Structured Activity, Clarification and Education    Response to Treatment Strategies:  Accepted Feedback, Gave Feedback, Listened, Focused on Goals, Attentive, Accepted Support and Alert    Name of Group: Group Psychotherapy    Description and Outcome:  Hour 1: Katerina reported that she has had another really good day, followed by a really bad day. The bad days don't seem to have any particularly \"bad\" things going, it is just that she feels more anxious. She continues to try and stay busy, particularly while her  is at work.   Hour 2: The group, by and large, is feeling very stuck, hopeless and depressed. The discussion for hour 2 was how do you get motivated enough to get out of the door to start trying to make some changes and not isolate.      Is this a Weekly Review of the Progress on the Treatment Plan?  Yes.        Are Treatment Plan Goals being addressed?  Yes, continue treatment goals          Are Treatment Plan Strategies to Address Goals Effective?  Yes, continue treatment strategies          Are there any current contracts in place?  Yes. Safety  "

## 2017-09-29 NOTE — PROGRESS NOTES
"  Adult Mental Health Outpatient Group Therapy Progress Note     Client Initial Individualized Goals for Treatment:Katerina verbalizes the following treatment/discharge goals: \"To decrease the fear of each day. Increase my self confidence again. To be able to face the day. Decrease anxiety symptoms\".        See Initial Treatment suggestions for the client during the time between Diagnostic Assessment and completion of the Master Individualized Treatment Plan.     Treatment Goals:  1. Will plan and problem-solve to return to previous socia, enjoyable activities and decrease lonliness and isolation    2. Will plan and problem-solve to decrease fears and anxiety and increase self confidence    3. Client will notify staff when needing assistance to develop or implement a coping plan to manage suicidal or self injurious urges.     Area of Treatment Focus:  Symptom Management, Develop / Improve Independent Living Skills and Develop Socialization / Interpersonal Relationship Skills    Therapeutic Interventions/Treatment Strategies:  Support, Feedback, Safety Assessments, Structured Activity and Problem Solving    Response to Treatment Strategies:  Accepted Feedback, Gave Feedback, Listened, Focused on Goals, Attentive and Accepted Support    Name of Group:  Mental health management  /Wellness    Description and Outcome:  reviewed neuroplasticity and negative plasticity then did breathing practice to calm nervous system, chair yoga pose ended with affirmation meditation. All verbalized understanding basic concepts and how they could work with material presented.      Is this a Weekly Review of the Progress on the Treatment Plan?  No          "

## 2017-09-29 NOTE — PROGRESS NOTES
"  Adult Mental Health Outpatient Group Therapy Progress Note       See Initial Treatment suggestions for the client during the time between Diagnostic Assessment and completion of the Master Individualized Treatment Plan.    Treatment Goals:     Will plan and problem-solve to return to previous socia, enjoyable activities and decrease lonliness and isolation  Will plan and problem-solve to decrease fears and anxiety and increase self confidence        Client will notify staff when needing assistance to develop or implement a coping plan to manage suicidal or self injurious urges.       Area of Treatment Focus:  Symptom Management    Therapeutic Interventions/Treatment Strategies:  Support, Feedback, Structured Activity, Problem Solving, Clarification and Education    Response to Treatment Strategies:  Accepted Feedback, Gave minmal Feedback, Listened, Focused on Goals, Attentive, Accepted Support and Alert    Name of Group:  Mental Health Management     Description and Outcome:  Katerina participated in a women's discussion about aging and mental health focusing on messages received from female role models. She appeared with flat affect and added to the discussion only when prompted. She states \"I need to learn how to get over this depression\".    Is this a Weekly Review of the Progress on the Treatment Plan?  No        "

## 2017-10-03 ENCOUNTER — HOSPITAL ENCOUNTER (OUTPATIENT)
Dept: BEHAVIORAL HEALTH | Facility: CLINIC | Age: 72
End: 2017-10-03
Attending: NURSE PRACTITIONER
Payer: MEDICARE

## 2017-10-03 PROCEDURE — H2012 BEHAV HLTH DAY TREAT, PER HR: HCPCS

## 2017-10-03 NOTE — PROGRESS NOTES
"  Adult Mental Health Outpatient Group Therapy Progress Note     Client Initial Individualized Goals for Treatment:Katerina verbalizes the following treatment/discharge goals: \"To decrease the fear of each day. Increase my self confidence again. To be able to face the day. Decrease anxiety symptoms\".      See Initial Treatment suggestions for the client during the time between Diagnostic Assessment and completion of the Master Individualized Treatment Plan.    Treatment Goals:     see above     Area of Treatment Focus:  Symptom Management and Develop Socialization / Interpersonal Relationship Skills    Therapeutic Interventions/Treatment Strategies:  Support, Feedback, Structured Activity, Clarification and Education    Response to Treatment Strategies:  Accepted Feedback, Gave Feedback, Listened, Focused on Goals, Attentive, Accepted Support and Alert    Name of Group: Group Psychotherapy, Coping W/ Depression    Description and Outcome   Hour 1: Katerina reported that she actually had an \"OK\" weekend. This is about the first time she was able to say that. She was busy all week end. Went walking with her daughter and out for lunch with her and her 3 year old granddaughter. She went out for lunch and went to a movie with her  and got some cleaning done around the house. She stated that she felt her anxiety creeping back in yesterday when she didn't have any plans and that having unscheduled times is generally a trigger for anxiety to increase.  Hour 2: The group discussed the general malaise that seems to be happening among group members and how they can collectively help each other become unstuck in this depression.   Coping W/ Depression:  The group engaged in an exercise in visualization, describing a place or time that was very pleasant for them. They were asked to pay attention to all of the senses when describing this and verbalize what that was like for them. The second part of the exercise was to be able to " access those feelings as way to distract or disrupt the depressive rumination.    .Is this a Weekly Review of the Progress on the Treatment Plan?  No

## 2017-10-03 NOTE — PROGRESS NOTES
"  Adult Mental Health Outpatient Group Therapy Progress Note     Client Initial Individualized Goals for Treatment:Katerina verbalizes the following treatment/discharge goals: \"To decrease the fear of each day. Increase my self confidence again. To be able to face the day. Decrease anxiety symptoms\".      See Initial Treatment suggestions for the client during the time between Diagnostic Assessment and completion of the Master Individualized Treatment Plan.    Treatment Goals:  . Will plan and problem-solve to return to previous socia, enjoyable activities and decrease lonliness and isolation  Will plan and problem-solve to decrease fears and anxiety and increase self confidence  Client will notify staff when needing assistance to develop or implement a coping plan to manage suicidal or self injurious urges.     Area of Treatment Focus:  Symptom Management and Develop Socialization / Interpersonal Relationship Skills    Therapeutic Interventions/Treatment Strategies:  Support, Feedback, Structured Activity, Clarification and Education    Response to Treatment Strategies:  Accepted Feedback, Gave Feedback, Listened, Focused on Goals, Attentive, Accepted Support and Alert    Name of Group:  Mental health management     Description and Outcome:  The group was provided with a guided breathing and warmup structure with focus on increasing self awareness and on providing an embodied experience.  Discussion included the importance of listening to body cues as a way of identifying emotion as well as accompanying needs and wants, and as a way of practising self compassion, authenticity, mindfulness, self expression,  and connection to other.  The group explored the body sense of feeling stuck vs. Unstuck.  Using strength the group was able to take not only take steps forward, but could use momentum to continue moving.  Curiousity was identified as useful in keep momentum.   Katerina was engaged and participated in " experiential.    Is this a Weekly Review of the Progress on the Treatment Plan?  No

## 2017-10-06 ENCOUNTER — HOSPITAL ENCOUNTER (OUTPATIENT)
Dept: BEHAVIORAL HEALTH | Facility: CLINIC | Age: 72
End: 2017-10-06
Attending: NURSE PRACTITIONER
Payer: MEDICARE

## 2017-10-06 PROCEDURE — H2012 BEHAV HLTH DAY TREAT, PER HR: HCPCS

## 2017-10-06 NOTE — PROGRESS NOTES
"Group Therapy Progress Notes      Client Initial Individualized Goals for Treatment:Katerina verbalizes the following treatment/discharge goals: \"To decrease the fear of each day. Increase my self confidence again. To be able to face the day. Decrease anxiety symptoms\".      Area of Treatment Focus:  Symptom Management, Personal Safety and Physical Health     Therapeutic Interventions/Treatment Strategies:  Support, Feedback, Safety Assessments, Problem Solving and Education    Response to Treatment Strategies:  Accepted Feedback, Gave Feedback, Listened, Focused on Goals, Attentive, Accepted Support and Alert    Name of Group:  Group therapy, anxiety management    Progress Note  Hour 1:  Client reported increased anxiety this week.  She said she has many physical symptoms of anxiety and is distressed because it does not seem to change from day to day.  She said she wakes with a sick feeling in her stomach and often becomes worse.  She does not notice a pattern to the anxiety symptoms and would like better control of her symptoms.  Problem solved skills to manage the physical symptoms of anxiety; TIP skills such as using an ice pack, paced breathing, and exercise.  Group members provided support and encouragement for skills use.   Hour 2: The group discussed the effect anxiety has on their daily lives and were able to generate support and ideas for acceptance to lessen the distress of not being able to control the frequency and intensity of the anxiety symptoms.  Coping with anxiety:  The group engaged in problem solving skills to manage anxiety.  The group members shared skills that have helped them; breathing, changing focus, decreases senses by closing their eyes, repeating a mantra, and engaging in physical exercise.  Group members encouraged each other to practice skills and report progress next session.       Is this a Weekly Review of the Progress on the Treatment Plan?  Yes.      Are Treatment Plan Goals being " addressed?  Yes, continue treatment goals      Are Treatment Plan Strategies to Address Goals Effective?  Yes, continue treatment strategies      Are there any current contracts in place?  No

## 2017-10-06 NOTE — PROGRESS NOTES
"  Adult Mental Health Outpatient Group Therapy Progress Note     Client Initial Individualized Goals for Treatment:Katerina verbalizes the following treatment/discharge goals: \"To decrease the fear of each day. Increase my self confidence again. To be able to face the day. Decrease anxiety symptoms\".      See Initial Treatment suggestions for the client during the time between Diagnostic Assessment and completion of the Master Individualized Treatment Plan.    Treatment Goals:  . Will plan and problem-solve to return to previous socia, enjoyable activities and decrease lonliness and isolation  Will plan and problem-solve to decrease fears and anxiety and increase self confidence  Client will notify staff when needing assistance to develop or implement a coping plan to manage suicidal or self injurious urges.       Area of Treatment Focus:  Symptom Management and Develop Socialization / Interpersonal Relationship Skills    Therapeutic Interventions/Treatment Strategies:  Support, Feedback, Structured Activity, Clarification and Education    Response to Treatment Strategies:  Accepted Feedback, Gave Feedback, Listened, Focused on Goals, Attentive, Accepted Support and Alert    Name of Group:  Mental health management group     Description and Outcome:  The group was provided with a psychoeducational discussion on opposite to emotion.  Group members explored the use of opposite action to \"get unstuck\" from negative emotions.  A worksheet was given to provide a structure for individual application. Katerina was engaged.  She asked for clarification in application to self.  Able to work through exercise with guidance.    Is this a Weekly Review of the Progress on the Treatment Plan?  No        "

## 2017-10-10 ENCOUNTER — HOSPITAL ENCOUNTER (OUTPATIENT)
Dept: BEHAVIORAL HEALTH | Facility: CLINIC | Age: 72
End: 2017-10-10
Attending: NURSE PRACTITIONER
Payer: MEDICARE

## 2017-10-10 PROCEDURE — H2012 BEHAV HLTH DAY TREAT, PER HR: HCPCS

## 2017-10-10 NOTE — PROGRESS NOTES
"  Adult Mental Health Outpatient Group Therapy Progress Note     Client Initial Individualized Goals for Treatment:Katerina verbalizes the following treatment/discharge goals: \"To decrease the fear of each day. Increase my self confidence again. To be able to face the day. Decrease anxiety symptoms\".      See Initial Treatment suggestions for the client during the time between Diagnostic Assessment and completion of the Master Individualized Treatment Plan.    Treatment Goals:     see above     Area of Treatment Focus:  Symptom Management and Develop Socialization / Interpersonal Relationship Skills    Therapeutic Interventions/Treatment Strategies:  Support, Feedback, Structured Activity, Clarification and Education    Response to Treatment Strategies:  Accepted Feedback, Gave Feedback, Listened, Focused on Goals, Attentive, Accepted Support and Alert    Name of Group: Group Psychotherapy, Mental Health Management  Description and Outcome   Hour 1: Katerina shared with the group that she again had a good week end and was very active with her . They went out with other couples and went to lunch together. He had off from work all week end, so she was pleased that he was home. She indicated that yesterday she did not feel as good and was more anxious. The group pointed out that she can completely overlook the positive of the weekend and just be overwhelmed with yesterday. She acknowledged that this was true and that she will try to continue to focus on the positive.  Hour 2: The group explored the piece of the group that seems to be sorely lacking, and that is the offering of feedback to each other. The group member report that it is difficult to offer support when you don't feel well yourself.     Mental Health Management: The group discussion was how to get \"unstuck\" without relying solely on medication for symptom relief. The group explored different ways to recognize the positive things they are doing, how to " reframe circumstances so they can be seen as positive or neutral, rather than negative, and how to solicit support appropriately.    Is this a Weekly Review of the Progress on the Treatment Plan?  No

## 2017-10-11 NOTE — PROGRESS NOTES
"  Adult Mental Health Outpatient Group Therapy Progress Note     Client Initial Individualized Goals for Treatment:Katerina verbalizes the following treatment/discharge goals: \"To decrease the fear of each day. Increase my self confidence again. To be able to face the day. Decrease anxiety symptoms\".      See Initial Treatment suggestions for the client during the time between Diagnostic Assessment and completion of the Master Individualized Treatment Plan.    Treatment Goals:  . Will plan and problem-solve to return to previous socia, enjoyable activities and decrease lonliness and isolation  Will plan and problem-solve to decrease fears and anxiety and increase self confidence  Client will notify staff when needing assistance to develop or implement a coping plan to manage suicidal or self injurious urges.     Area of Treatment Focus:  Symptom Management and Develop Socialization / Interpersonal Relationship Skills    Therapeutic Interventions/Treatment Strategies:  Support, Feedback, Structured Activity, Clarification and Education    Response to Treatment Strategies:  Accepted Feedback, Gave Feedback, Listened, Focused on Goals, Attentive, Accepted Support and Alert    Name of Group:  Mental health management     Description and Outcome:  The group was provided with a guided breathing and warmup structure with focus on increasing self awareness and on providing an embodied experience.  Discussion included the importance of listening to body cues as a way of identifying emotion as well as accompanying needs and wants, and as a way of practising self compassion, authenticity, mindfulness, self expression,  and connection to other.  The group identified feeling tired at end of day, however as the warmup progressed a movement emerged involving quickness and strength.  A theme developed into \"temper tantrums.\"  The group identified frustration with their illness and explored giving a voice to that frustration through " khushboo Borges was engaged.  She asked clarifying questions around her frustration with anxiety.  Had understood from articles that she had read that she should not be angry with anxiety.  Clarified being frustrated with illness is different from fighting it.     Is this a Weekly Review of the Progress on the Treatment Plan?  No

## 2017-10-13 ENCOUNTER — HOSPITAL ENCOUNTER (OUTPATIENT)
Dept: BEHAVIORAL HEALTH | Facility: CLINIC | Age: 72
End: 2017-10-13
Attending: NURSE PRACTITIONER
Payer: MEDICARE

## 2017-10-13 PROCEDURE — H2012 BEHAV HLTH DAY TREAT, PER HR: HCPCS

## 2017-10-13 NOTE — PROGRESS NOTES
"Group Therapy Progress Notes      Client Initial Individualized Goals for Treatment:Katerina verbalizes the following treatment/discharge goals: \"To decrease the fear of each day. Increase my self confidence again. To be able to face the day. Decrease anxiety symptoms\".      Area of Treatment Focus:  Symptom Management, Personal Safety and Physical Health     Therapeutic Interventions/Treatment Strategies:  Support, Feedback, Safety Assessments, Problem Solving and Education    Response to Treatment Strategies:  Accepted Feedback, Gave Feedback, Listened, Focused on Goals, Attentive, Accepted Support and Alert    Name of Group:  Group Psychotherapy  Progress Note  Hour 1: Katerina shared with the group that she has had a couple more good days. She was active around the house and kept busy with friends. She is planning on babysitting her 3 year old granddaughter this week end. She is a bit anxious about this, but believes that they will have fun. She received much support from the group for her accomplishments, though she tends to downplay them in her own eyes.  Hour 2: The group discussed the recurring issue of finding something meaningful to pursue in their life. Whether this would be in a volunteer capacity, a hobby, or part-time job, it is important to have something that gives one a feeling of accomplishment.    Is this a Weekly Review of the Progress on the Treatment Plan?  Yes.      Are Treatment Plan Goals being addressed?  Yes, continue treatment goals      Are Treatment Plan Strategies to Address Goals Effective?  Yes, continue treatment strategies      Are there any current contracts in place?  No           "
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
## 2017-10-17 ENCOUNTER — HOSPITAL ENCOUNTER (OUTPATIENT)
Dept: BEHAVIORAL HEALTH | Facility: CLINIC | Age: 72
End: 2017-10-17
Attending: NURSE PRACTITIONER
Payer: MEDICARE

## 2017-10-17 PROCEDURE — H2012 BEHAV HLTH DAY TREAT, PER HR: HCPCS

## 2017-10-17 NOTE — PROGRESS NOTES
"  Adult Mental Health Outpatient Group Therapy Progress Note     Client Initial Individualized Goals for Treatment:Katerina verbalizes the following treatment/discharge goals: \"To decrease the fear of each day. Increase my self confidence again. To be able to face the day. Decrease anxiety symptoms\".      See Initial Treatment suggestions for the client during the time between Diagnostic Assessment and completion of the Master Individualized Treatment Plan.    Treatment Goals:     see above     Area of Treatment Focus:  Symptom Management and Develop Socialization / Interpersonal Relationship Skills    Therapeutic Interventions/Treatment Strategies:  Support, Feedback, Structured Activity, Clarification and Education    Response to Treatment Strategies:  Accepted Feedback, Gave Feedback, Listened, Focused on Goals, Attentive, Accepted Support and Alert    Name of Group: Group Psychotherapy, Wellness  Description and Outcome   Hour 1: Katerina shared with the group that she is feeling less anxious today and has not been as shaky as she has been in the past. She babysat her 3 year old granddaughter on the weekend and reported that this went well. Katerina seems brighter in group and is more spontaneous to joke and laugh. However, she has an unblinking stare at times and it is difficult to ascertain what she is thinking at those moments. She states that she is just feeling very sedated from her medications.  Hour 2: The group discussed the importance for all members to be committed to the group. Without that sense of commitment, there is a tendency for members to not fully share what they need to be dealing with.     Wellness: The client actively participated in the group activity of blending some yoga positions with the verbal expression of spiritual values. The group also defined what a \"sense of peace\" was to each individual.    Is this a Weekly Review of the Progress on the Treatment Plan?  No  "

## 2017-10-20 ENCOUNTER — HOSPITAL ENCOUNTER (OUTPATIENT)
Dept: BEHAVIORAL HEALTH | Facility: CLINIC | Age: 72
End: 2017-10-20
Attending: NURSE PRACTITIONER
Payer: MEDICARE

## 2017-10-20 PROCEDURE — H2012 BEHAV HLTH DAY TREAT, PER HR: HCPCS

## 2017-10-20 NOTE — PROGRESS NOTES
"  Adult Mental Health Outpatient Group Therapy Progress Note     Client Initial Individualized Goals for Treatment:Katerina verbalizes the following treatment/discharge goals: \"To decrease the fear of each day. Increase my self confidence again. To be able to face the day. Decrease anxiety symptoms\".      See Initial Treatment suggestions for the client during the time between Diagnostic Assessment and completion of the Master Individualized Treatment Plan.    Treatment Goals:  . Will plan and problem-solve to return to previous socia, enjoyable activities and decrease lonliness and isolation  Will plan and problem-solve to decrease fears and anxiety and increase self confidence  Client will notify staff when needing assistance to develop or implement a coping plan to manage suicidal or self injurious urges.       Area of Treatment Focus:  Symptom Management and Develop Socialization / Interpersonal Relationship Skills    Therapeutic Interventions/Treatment Strategies:  Support, Feedback, Structured Activity, Clarification and Education    Response to Treatment Strategies:  Accepted Feedback, Gave Feedback, Listened, Focused on Goals, Attentive, Accepted Support and Alert    Name of Group:  Mental health management / Wellness    Description and Outcome:  Facilitated topic on ageism, the aging process and cognitive distortions that can contribute to depression and anxiety. Katerina stated, \" I have no motivation\". Writer and group challenged this distortion as she attends programming regularly. She was then able to see other ways she is engaging in recovery including building a healthy routine, reaching out. She to stated this group helped her become more aware of her own internal ageism as well as how prevalent it is in the culture.           Is this a Weekly Review of the Progress on the Treatment Plan?  No        "

## 2017-10-20 NOTE — PROGRESS NOTES
"Group Therapy Progress Notes      Client Initial Individualized Goals for Treatment:Katerina verbalizes the following treatment/discharge goals: \"To decrease the fear of each day. Increase my self confidence again. To be able to face the day. Decrease anxiety symptoms\".      Area of Treatment Focus:  Symptom Management, Personal Safety and Physical Health     Therapeutic Interventions/Treatment Strategies:  Support, Feedback, Safety Assessments, Problem Solving and Education    Response to Treatment Strategies:  Accepted Feedback, Gave Feedback, Listened, Focused on Goals, Attentive, Accepted Support and Alert    Name of Group:  Group Psychotherapy  Progress Note  Hour 1: Katerina reported to the group that she did not sleep at all last night because she was worried about her medications. She is wanting to change psychiatrists, but her therapist recommended against this until she sees a doctor for a second opinion in early December. She has refused to start taking cogentin because she does not want to take another medication and one of the possible side-effects may be drowsiness. She stated that she already feels drowsy so doesn't want it. It was suggested that it may help alleviate some of the shakiness in her hands, and she indicated that in that case, she might consider it.  Hour 2: The group discussed what outside activities they might pursue that will help them move towards developing some outside activity or interest to involve themselves in after discharge from this program.     Is this a Weekly Review of the Progress on the Treatment Plan?  Yes.      Are Treatment Plan Goals being addressed?  Yes, continue treatment goals      Are Treatment Plan Strategies to Address Goals Effective?  Yes, continue treatment strategies      Are there any current contracts in place?  Yes, safety.         "

## 2017-10-20 NOTE — PROGRESS NOTES
"  Adult Mental Health Outpatient Group Therapy Progress Note     Client Initial Individualized Goals for Treatment:Katerina verbalizes the following treatment/discharge goals: \"To decrease the fear of each day. Increase my self confidence again. To be able to face the day. Decrease anxiety symptoms\".      See Initial Treatment suggestions for the client during the time between Diagnostic Assessment and completion of the Master Individualized Treatment Plan.    Treatment Goals:  . Will plan and problem-solve to return to previous socia, enjoyable activities and decrease lonliness and isolation  Will plan and problem-solve to decrease fears and anxiety and increase self confidence  Client will notify staff when needing assistance to develop or implement a coping plan to manage suicidal or self injurious urges.     Area of Treatment Focus:  Symptom Management and Develop Socialization / Interpersonal Relationship Skills    Therapeutic Interventions/Treatment Strategies:  Support, Feedback, Clarification and Education    Response to Treatment Strategies:  Accepted Feedback, Gave Feedback, Listened, Focused on Goals, Attentive, Accepted Support and Alert    Name of Group:  Mental health management     Description and Outcome:  The group viewed a Kelly Jain video on opposite emotion as a continuation from discussion  Two weeks ago.  The group reviewed their thoughts over past two weeks and discussed new material from today, including concept of determining if emotion is justified.  Katerina was engaged. Shared that she had used this skill this week and had found it helpful.  Asked for feedback in using skill to manage her anxiety and frustration losing lip balm today.  Accepted the feedback.    Is this a Weekly Review of the Progress on the Treatment Plan?  No        "

## 2017-10-20 NOTE — ADDENDUM NOTE
Encounter addended by: Estee Díaz RN on: 10/20/2017  3:55 PM<BR>     Actions taken: Flowsheet accepted

## 2017-10-20 NOTE — ADDENDUM NOTE
Encounter addended by: Estee Díaz RN on: 10/20/2017  4:17 PM<BR>     Actions taken: Flowsheet accepted

## 2017-10-20 NOTE — ADDENDUM NOTE
Encounter addended by: Estee Díaz RN on: 10/20/2017  4:21 PM<BR>     Actions taken: Flowsheet accepted

## 2017-10-24 ENCOUNTER — HOSPITAL ENCOUNTER (OUTPATIENT)
Dept: BEHAVIORAL HEALTH | Facility: CLINIC | Age: 72
End: 2017-10-24
Attending: NURSE PRACTITIONER
Payer: MEDICARE

## 2017-10-24 PROCEDURE — H2012 BEHAV HLTH DAY TREAT, PER HR: HCPCS

## 2017-10-24 NOTE — PROGRESS NOTES
"  Adult Mental Health Outpatient Group Therapy Progress Note     Client Initial Individualized Goals for Treatment:Katerina verbalizes the following treatment/discharge goals: \"To decrease the fear of each day. Increase my self confidence again. To be able to face the day. Decrease anxiety symptoms\".      See Initial Treatment suggestions for the client during the time between Diagnostic Assessment and completion of the Master Individualized Treatment Plan.    Treatment Goals:     see above     Area of Treatment Focus:  Symptom Management and Develop Socialization / Interpersonal Relationship Skills    Therapeutic Interventions/Treatment Strategies:  Support, Feedback, Structured Activity, Clarification and Education    Response to Treatment Strategies:  Accepted Feedback, Gave Feedback, Listened, Focused on Goals, Attentive, Accepted Support and Alert    Name of Group: Group Psychotherapy  Description and Outcome   Hour 1: Katerina reported that she had a good weekend. She stayed busy and her mood was good. She was quick to add that yesterday was \"awful\", however. Apparently, her  had planned for them to go on a drive to look at the leaves and this disrupted her daily routine and she stated \"I was all discombobulated for the rest of the day\". It was pointed out that she was focusing on only one negative thing, when three days prior, she had a good time. She also continues to be locked into the idea that a medication is the only thing that will help her \"feel like I used to\".   Hour 2: The group discussed the necessity for action as a path for implementing change. We underscored the thinking that medication is helpful, to a degree, but any change is going to take some action on the part of the individual.     Is this a Weekly Review of the Progress on the Treatment Plan?  No  "

## 2017-10-24 NOTE — PROGRESS NOTES
"  Adult Mental Health Outpatient Group Therapy Progress Note     Client Initial Individualized Goals for Treatment:Katerina verbalizes the following treatment/discharge goals: \"To decrease the fear of each day. Increase my self confidence again. To be able to face the day. Decrease anxiety symptoms\".      See Initial Treatment suggestions for the client during the time between Diagnostic Assessment and completion of the Master Individualized Treatment Plan.    Treatment Goals:  . Will plan and problem-solve to return to previous socia, enjoyable activities and decrease lonliness and isolation  Will plan and problem-solve to decrease fears and anxiety and increase self confidence  Client will notify staff when needing assistance to develop or implement a coping plan to manage suicidal or self injurious urges.       Area of Treatment Focus:  Symptom Management and Develop Socialization / Interpersonal Relationship Skills    Therapeutic Interventions/Treatment Strategies:  Support, Feedback, Structured Activity, Clarification and Education    Response to Treatment Strategies:  Accepted Feedback, Gave Feedback, Listened, Focused on Goals, Attentive, Accepted Support and Alert    Name of Group:  Mental health management     Description and Outcome:  The group was provided with a guided breathing and warmup structure with focus on increasing self awareness and on providing an embodied experience.  Discussion included the importance of listening to body cues as a way of identifying emotion as well as accompanying needs and wants, and as a way of practising self compassion, authenticity, mindfulness, self expression,  and connection to other.  The group developed a movement phrase that explored \"watching life go by\" and taking action, leading into interaction with others.  The group addressed the use/importance of strength and the importance of connection. Katerina was engaged, both in exploration of strength and connection.  She " identifies being able to apply sense of strength to self confidence.    Is this a Weekly Review of the Progress on the Treatment Plan?  No

## 2017-10-27 ENCOUNTER — HOSPITAL ENCOUNTER (OUTPATIENT)
Dept: BEHAVIORAL HEALTH | Facility: CLINIC | Age: 72
End: 2017-10-27
Attending: NURSE PRACTITIONER
Payer: MEDICARE

## 2017-10-27 PROCEDURE — H2012 BEHAV HLTH DAY TREAT, PER HR: HCPCS

## 2017-10-27 NOTE — PROGRESS NOTES
Adult Mental Health Outpatient Group Therapy Progress Note       See Initial Treatment suggestions for the client during the time between Diagnostic Assessment and completion of the Master Individualized Treatment Plan.    Treatment Goals:     Will plan and problem-solve to return to previous socia, enjoyable activities and decrease lonliness and isolation  Will plan and problem-solve to decrease fears and anxiety and increase self confidence                        Client will notify staff when needing assistance to develop or implement a coping plan to manage suicidal or self injurious urges.       Area of Treatment Focus:  Symptom Management    Therapeutic Interventions/Treatment Strategies:  Support, Feedback, Structured Activity, Problem Solving, Clarification and Education    Response to Treatment Strategies:  Accepted Feedback, Gave Feedback, Listened, Focused on Goals, Attentive, Accepted Support and Alert    Name of Group:  Wellness and Mental Health Mgt     Description and Outcome:  In Wellness group client participated in a discussion about women's roles, aging and purpose with other female group members. In Mental Health Mgt group client participated in a discussion about perfectionism and ways that this impedes recovery from depression.  Is this a Weekly Review of the Progress on the Treatment Plan?  No

## 2017-10-27 NOTE — PROGRESS NOTES
"Group Therapy Progress Notes      Client Initial Individualized Goals for Treatment:Katerina verbalizes the following treatment/discharge goals: \"To decrease the fear of each day. Increase my self confidence again. To be able to face the day. Decrease anxiety symptoms\".      Area of Treatment Focus:  Symptom Management, Personal Safety and Physical Health     Therapeutic Interventions/Treatment Strategies:  Support, Feedback, Safety Assessments, Problem Solving and Education    Response to Treatment Strategies:  Accepted Feedback, Gave Feedback, Listened, Focused on Goals, Attentive, Accepted Support and Alert    Name of Group:  Group Psychotherapy  Progress Note  Hour 1: Katerina shared with the group that she feels anxious today. The previous days were OK, \"but not the greatest\". She shared that she has been arguing quite a bit with her  and that he is resentful of having to limit their plans because of her anxiety. She reported that he can be \"domineering, but I already knew that about him\". She stated that she has been standing up for herself more lately and that is what tends to trigger the arguments.  Hour 2: The group discussed the common theme of managing anxiety, while trying to maintain a more active social life and how to do so in a relationship. Many group members indicated that their spouses become frustrated when engagements get cut short or missed due to their symptoms.     Is this a Weekly Review of the Progress on the Treatment Plan?  Yes.      Are Treatment Plan Goals being addressed?  Yes, continue treatment goals      Are Treatment Plan Strategies to Address Goals Effective?  Yes, continue treatment strategies      Are there any current contracts in place?  Yes, safety.         "

## 2017-10-31 ENCOUNTER — HOSPITAL ENCOUNTER (OUTPATIENT)
Dept: BEHAVIORAL HEALTH | Facility: CLINIC | Age: 72
End: 2017-10-31
Attending: NURSE PRACTITIONER
Payer: MEDICARE

## 2017-10-31 PROCEDURE — H2012 BEHAV HLTH DAY TREAT, PER HR: HCPCS

## 2017-10-31 NOTE — PROGRESS NOTES
"  Adult Mental Health Outpatient Group Therapy Progress Note     Client Initial Individualized Goals for Treatment:Katerina verbalizes the following treatment/discharge goals: \"To decrease the fear of each day. Increase my self confidence again. To be able to face the day. Decrease anxiety symptoms\".      See Initial Treatment suggestions for the client during the time between Diagnostic Assessment and completion of the Master Individualized Treatment Plan.    Treatment Goals:     see above     Area of Treatment Focus:  Symptom Management and Develop Socialization / Interpersonal Relationship Skills    Therapeutic Interventions/Treatment Strategies:  Support, Feedback, Structured Activity, Clarification and Education    Response to Treatment Strategies:  Accepted Feedback, Gave Feedback, Listened, Focused on Goals, Attentive, Accepted Support and Alert    Name of Group: Group Psychotherapy  Description and Outcome   Hour 1: Katerina reported to the group that she has been feeling down \"for about a week now\". She was upset because her  was making her take her car to the garage and for the repair work to be done. She was reporting that this was causing her a great deal of anxiety. We discussed the notion that she had already decided it was going to be a bad experience before ever going there. She agreed and was going to try and approach it from a more positive standpoint, such as, she was taking care of her own things. She did not like that her  is \"making\" her do things to get better.  Hour 2: The group discussed the common repercussions that anxiety has been having on their lives and the relationship between depression and anxiety.      Is this a Weekly Review of the Progress on the Treatment Plan?  No  "

## 2017-11-01 NOTE — PROGRESS NOTES
Psychiatry staffing: case discussed  Diagnosis: Bipolar d/o. Doing well. Close to discharge.   Current Outpatient Prescriptions   Medication     OLANZapine (ZYPREXA PO)     GABAPENTIN PO     OLANZapine (ZYPREXA PO)     Divalproex Sodium (DEPAKOTE PO)     MELATONIN PO     clonazePAM (KLONOPIN) 0.5 MG tablet     multivitamin, therapeutic (THERA-VIT) TABS     calcium carbonate (OS- MG [place]. [place]) 500 MG tablet     No current facility-administered medications for this encounter.      Past Medical History:   Diagnosis Date     Arthritis     oesteoporosis and OA-hands     Cancer (H) 2015    skin     Depressive disorder     Bipolar

## 2017-11-07 ENCOUNTER — HOSPITAL ENCOUNTER (OUTPATIENT)
Dept: BEHAVIORAL HEALTH | Facility: CLINIC | Age: 72
End: 2017-11-07
Attending: NURSE PRACTITIONER
Payer: MEDICARE

## 2017-11-07 PROCEDURE — H2012 BEHAV HLTH DAY TREAT, PER HR: HCPCS

## 2017-11-08 NOTE — PROGRESS NOTES
"  Adult Mental Health Outpatient Group Therapy Progress Note     Client Initial Individualized Goals for Treatment:Katerina verbalizes the following treatment/discharge goals: \"To decrease the fear of each day. Increase my self confidence again. To be able to face the day. Decrease anxiety symptoms\".      See Initial Treatment suggestions for the client during the time between Diagnostic Assessment and completion of the Master Individualized Treatment Plan.    Treatment Goals:  . Will plan and problem-solve to return to previous socia, enjoyable activities and decrease lonliness and isolation  Will plan and problem-solve to decrease fears and anxiety and increase self confidence  Client will notify staff when needing assistance to develop or implement a coping plan to manage suicidal or self injurious urges.     Area of Treatment Focus:  Symptom Management and Develop Socialization / Interpersonal Relationship Skills    Therapeutic Interventions/Treatment Strategies:  Support, Feedback, Structured Activity, Clarification and Education    Response to Treatment Strategies:  Accepted Feedback, Gave Feedback, Listened, Focused on Goals, Attentive, Accepted Support and Alert    Name of Group:  Mental health management     Description and Outcome:  The group was provided with a guided breathing and warmup structure with focus on increasing self awareness and on providing an embodied experience.  Discussion included the importance of listening to body cues as a way of identifying emotion as well as accompanying needs and wants, and as a way of practising self compassion, authenticity, mindfulness, self expression,  and connection to other.  The group focused on embodying the contrast between \"rigidity\" and \"fluidity\" using the images of soldier and jellyfish.  The group discussed the freedom and flexibility that was associated with jelly fish image.  Group members individually shared a movement with the group, allowing " themselves to be witnessed and joined. Katerina was engaged.  She shared observations regarding awareness of body and needs.    Is this a Weekly Review of the Progress on the Treatment Plan?  No

## 2017-11-08 NOTE — PROGRESS NOTES
"  Adult Mental Health Outpatient Group Therapy Progress Note     Client Initial Individualized Goals for Treatment:Katerina verbalizes the following treatment/discharge goals: \"To decrease the fear of each day. Increase my self confidence again. To be able to face the day. Decrease anxiety symptoms\".      See Initial Treatment suggestions for the client during the time between Diagnostic Assessment and completion of the Master Individualized Treatment Plan.    Treatment Goals:     see above     Area of Treatment Focus:  Symptom Management and Develop Socialization / Interpersonal Relationship Skills    Therapeutic Interventions/Treatment Strategies:  Support, Feedback, Structured Activity, Clarification and Education    Response to Treatment Strategies:  Accepted Feedback, Gave Feedback, Listened, Focused on Goals, Attentive, Accepted Support and Alert    Name of Group: Group Psychotherapy, Mental Health Management  Description and Outcome   Hour 1: Katerina shared with group that she had a good time at the [facility] last Friday and having lunch with her friends. She reported that the week end went well, but then reported that yesterday \"was terrible\". When asked what was terrible, she simply stated that she didn't have anything scheduled so she didn't know what to do with herself and her anxiety felt out of control. Again, it was pointed out that there were several good days that she completely dismissed and focused solely on one period of one day where she was anxious. She was strongly encouraged by the group to focus on what is going right. She also reported that she and her  are going to start couples counseling soon.  Hour 2: The group again focused on the recurring theme of feeling overwhelmingly anxious. This is an ongoing problem for most in the group, an more prominently the increase in anxiety of the prospect or the actuality of being alone at home. The group exchanged ideas of how they dealt with " "it and what was effective for them.      Mental Health Management: Katerina participated in the group discussion of the handout \"Barriers to Self-Esteem\". Ultimately, the discussion led back to exploring messages that came from the family of origin and how that continues to interfere today. Frequently, it seemed that the message or criticism was not meant in an intentionally hurtful way, but it may have been presented poorly or understood to be personal condemnation. The group explored ways to reframe that message into something could be helpful.      Is this a Weekly Review of the Progress on the Treatment Plan?  No  "

## 2017-11-10 ENCOUNTER — HOSPITAL ENCOUNTER (OUTPATIENT)
Dept: BEHAVIORAL HEALTH | Facility: CLINIC | Age: 72
End: 2017-11-10
Attending: NURSE PRACTITIONER
Payer: MEDICARE

## 2017-11-10 ENCOUNTER — TELEPHONE (OUTPATIENT)
Dept: BEHAVIORAL HEALTH | Facility: CLINIC | Age: 72
End: 2017-11-10

## 2017-11-10 PROCEDURE — H2012 BEHAV HLTH DAY TREAT, PER HR: HCPCS

## 2017-11-10 NOTE — TELEPHONE ENCOUNTER
----- Message from Dylon Rahman sent at 11/10/2017  9:55 AM CST -----  Regarding: need more appts for 55+ starting 11/10/17  Pt needs more appts for 55+ Program starting 11/10/17 in C track under [facility].  Thanks!

## 2017-11-10 NOTE — PROGRESS NOTES
"  Adult Mental Health Outpatient Group Therapy Progress Note     Client Initial Individualized Goals for Treatment:Katerina verbalizes the following treatment/discharge goals: \"To decrease the fear of each day. Increase my self confidence again. To be able to face the day. Decrease anxiety symptoms\".      See Initial Treatment suggestions for the client during the time between Diagnostic Assessment and completion of the Master Individualized Treatment Plan.    Treatment Goals:  . Will plan and problem-solve to return to previous socia, enjoyable activities and decrease lonliness and isolation  Will plan and problem-solve to decrease fears and anxiety and increase self confidence  Client will notify staff when needing assistance to develop or implement a coping plan to manage suicidal or self injurious urges.       Area of Treatment Focus:  Symptom Management and Develop Socialization / Interpersonal Relationship Skills    Therapeutic Interventions/Treatment Strategies:  Support, Feedback, Structured Activity, Clarification and Education    Response to Treatment Strategies:  Accepted Feedback, Gave Feedback, Listened, Focused on Goals, Attentive, Accepted Support and Alert    Name of Group:  Coping with Depression    Description and Outcome:  Facilitated meditation group using breath work and visualization, as well as mindful chair movement. Katerina engaged in practice, did verbalize that she felt more relaxed but limited feedback about how she could use skills.    Is this a Weekly Review of the Progress on the Treatment Plan?  No        "

## 2017-11-10 NOTE — PROGRESS NOTES
"Group Therapy Progress Notes      Client Initial Individualized Goals for Treatment:Katerina verbalizes the following treatment/discharge goals: \"To decrease the fear of each day. Increase my self confidence again. To be able to face the day. Decrease anxiety symptoms\".      Area of Treatment Focus:  Symptom Management, Personal Safety and Physical Health     Therapeutic Interventions/Treatment Strategies:  Support, Feedback, Safety Assessments, Problem Solving and Education    Response to Treatment Strategies:  Accepted Feedback, Gave Feedback, Listened, Focused on Goals, Attentive, Accepted Support and Alert    Name of Group:  Group Psychotherapy  Progress Note  Hour 1: Katerina shared with the group that her week has been going along well. She based this on a dermatologist and dentist appointment that both went well. She seemed to have to some trouble with her recall of what she did even as recent as yesterday. She reported that she was upset with her  because he was now doing her tasks, such as dusting and vacuuming. When asked why he was doing this, she reported it was because she was not keeping up with any household chores. She was also encouraged to continue to practice not focusing on future plans as already being negative, and thusly becoming a self-fulfilling prophecy. She will try to remain neutral or positive, but she added, if I remember.  Hour 2: The group problem-solved a problem common to all in the group, avoiding telephone calls. We discussed what might be behind and explored the notion of shame or guilt that each person was dealing with because of their depression. This avoidance also had consequences that fed into more isolation and falling of contact with friends who might be supportive, or missing events. The group decided a good option was to wait until they felt comfortable and then return the call. It would provide a feeling of being more in control and they could prepare themselves to " share only what they wished to.        Is this a Weekly Review of the Progress on the Treatment Plan?  Yes.      Are Treatment Plan Goals being addressed?  Yes, continue treatment goals      Are Treatment Plan Strategies to Address Goals Effective?  Yes, continue treatment strategies      Are there any current contracts in place?  Yes, safety.

## 2017-11-10 NOTE — PROGRESS NOTES
Adult Mental Health Outpatient Group Therapy Progress Note         See Initial Treatment suggestions for the client during the time between Diagnostic Assessment and completion of the Master Individualized Treatment Plan.    Treatment Goals:     Will plan and problem-solve to return to previous socia, enjoyable activities and decrease lonliness and isolation  Will plan and problem-solve to decrease fears and anxiety and increase self confidence                       Status: Active  Client will notify staff when needing assistance to develop or implement a coping plan to manage suicidal or self injurious urges.       Area of Treatment Focus:  Symptom Management    Therapeutic Interventions/Treatment Strategies:  Support, Feedback, Structured Activity, Problem Solving, Clarification and Education    Response to Treatment Strategies:  Accepted Feedback, Gave Feedback, Listened, Focused on Goals, Attentive, Accepted Support and Alert    Name of Group:  Mental health management     Description and Outcome:  Client participated in a narrative therapy exercise writing about 1. Someone who's made a difference in my life 2. What most excites you in the world. Katerina appeared with flat affect and questioned her own ability to write, but later became more engaged and brighter.    Is this a Weekly Review of the Progress on the Treatment Plan?  No

## 2017-11-14 ENCOUNTER — HOSPITAL ENCOUNTER (OUTPATIENT)
Dept: BEHAVIORAL HEALTH | Facility: CLINIC | Age: 72
End: 2017-11-14
Attending: NURSE PRACTITIONER
Payer: MEDICARE

## 2017-11-14 PROCEDURE — H2012 BEHAV HLTH DAY TREAT, PER HR: HCPCS

## 2017-11-14 NOTE — PROGRESS NOTES
"  Adult Mental Health Outpatient Group Therapy Progress Note     Client Initial Individualized Goals for Treatment:Katerina verbalizes the following treatment/discharge goals: \"To decrease the fear of each day. Increase my self confidence again. To be able to face the day. Decrease anxiety symptoms\".      See Initial Treatment suggestions for the client during the time between Diagnostic Assessment and completion of the Master Individualized Treatment Plan.    Treatment Goals:     see above     Area of Treatment Focus:  Symptom Management and Develop Socialization / Interpersonal Relationship Skills    Therapeutic Interventions/Treatment Strategies:  Support, Feedback, Structured Activity, Clarification and Education    Response to Treatment Strategies:  Accepted Feedback, Gave Feedback, Listened, Focused on Goals, Attentive, Accepted Support and Alert    Name of Group: Group Psychotherapy, Mental Health Management  Description and Outcome   Hour 1: Katerina shared with the group that she was busy all day Saturday with friends for lunch and then a walk with her daughter. She had taken steps to make sure that she was busy all day because her  was working. She was upset because her  watched football all day on Sunday and she felt like she had nothing to do, though she later indicated that she has quite a bit of housework that she could be doing. They did, however, go out to a movie on Sunday night. She was again encouraged by the group to focus more on the positive things that she is doing rather than one negative event.   Hour 2: The group discussed how their families dealt with problems in their home when they were growing up and how it impacts them in their lives today. They also discussed the toxicity of silence and confusion around \"family secrets\".    Mental Health Management: The group did a short pencil and paper checklist regarding the attitude around asking for help in their family of origin. Then " discussed how much those attitudes prevailed in their thinking today.      Is this a Weekly Review of the Progress on the Treatment Plan?  No

## 2017-11-15 NOTE — PROGRESS NOTES
"  Adult Mental Health Outpatient Group Therapy Progress Note     Client Initial Individualized Goals for Treatment:Katerina verbalizes the following treatment/discharge goals: \"To decrease the fear of each day. Increase my self confidence again. To be able to face the day. Decrease anxiety symptoms\".      See Initial Treatment suggestions for the client during the time between Diagnostic Assessment and completion of the Master Individualized Treatment Plan.    Treatment Goals:  . Will plan and problem-solve to return to previous socia, enjoyable activities and decrease lonliness and isolation  Will plan and problem-solve to decrease fears and anxiety and increase self confidence  Client will notify staff when needing assistance to develop or implement a coping plan to manage suicidal or self injurious urges.     Area of Treatment Focus:  Symptom Management and Develop Socialization / Interpersonal Relationship Skills    Therapeutic Interventions/Treatment Strategies:  Support, Feedback, Structured Activity, Clarification and Education    Response to Treatment Strategies:  Accepted Feedback, Gave Feedback, Listened, Focused on Goals, Attentive, Accepted Support and Alert    Name of Group:  Mental health management     Description and Outcome:  The group was provided with a guided breathing and warmup structure with focus on increasing self awareness and on providing an embodied experience.  Discussion included the importance of listening to body cues as a way of identifying emotion as well as accompanying needs and wants, and as a way of practising self compassion, authenticity, mindfulness, self expression,  and connection to other.  The group explored \"awake\" relaxation and its importance in initiation.  They explored [place]-Cohen's satisfaction cycle identifying barriers in obtaining what they reach for.  Katerina was engaged.  She shared that she was less anxious at end of group, noting that while moving, was " focusing on here and now.    Is this a Weekly Review of the Progress on the Treatment Plan?  No

## 2017-11-21 ENCOUNTER — HOSPITAL ENCOUNTER (OUTPATIENT)
Dept: BEHAVIORAL HEALTH | Facility: CLINIC | Age: 72
End: 2017-11-21
Attending: NURSE PRACTITIONER
Payer: MEDICARE

## 2017-11-21 PROCEDURE — H2012 BEHAV HLTH DAY TREAT, PER HR: HCPCS

## 2017-11-21 PROCEDURE — H2035 A/D TX PROGRAM, PER HOUR: HCPCS | Mod: HQ

## 2017-11-21 NOTE — PROGRESS NOTES
"  Adult Mental Health Outpatient Group Therapy Progress Note     Client Initial Individualized Goals for Treatment:Katerina verbalizes the following treatment/discharge goals: \"To decrease the fear of each day. Increase my self confidence again. To be able to face the day. Decrease anxiety symptoms\".      See Initial Treatment suggestions for the client during the time between Diagnostic Assessment and completion of the Master Individualized Treatment Plan.    Treatment Goals:  . Will plan and problem-solve to return to previous socia, enjoyable activities and decrease lonliness and isolation  Will plan and problem-solve to decrease fears and anxiety and increase self confidence  Client will notify staff when needing assistance to develop or implement a coping plan to manage suicidal or self injurious urges.       Area of Treatment Focus:  Symptom Management and Develop Socialization / Interpersonal Relationship Skills    Therapeutic Interventions/Treatment Strategies:  Support, Feedback, Clarification and Education    Response to Treatment Strategies:  Accepted Feedback, Gave Feedback, Listened, Focused on Goals, Attentive, Accepted Support and Alert    Name of Group:  Mental health management     Description and Outcome:  The group was provided with a guided breathing and warmup structure with focus on increasing self awareness and on providing an embodied experience.  Discussion included the importance of listening to body cues as a way of identifying emotion as well as accompanying needs and wants, and as a way of practising self compassion, authenticity, mindfulness, self expression,  and connection to other.  The group focused on feeling and need identification which led to an exploration of noticing amarilys and beauty both within  Themselves and in the environment.  Katerina was engaged and participated in experiential and discussion.  Reported decrease in anxiety at end of session.    Is this a Weekly Review of the " Progress on the Treatment Plan?  No

## 2017-11-21 NOTE — PROGRESS NOTES
"Group Therapy Progress Notes      Client Initial Individualized Goals for Treatment:Katerina verbalizes the following treatment/discharge goals: \"To decrease the fear of each day. Increase my self confidence again. To be able to face the day. Decrease anxiety symptoms\".      Area of Treatment Focus:  Symptom Management, Personal Safety and Physical Health     Therapeutic Interventions/Treatment Strategies:  Support, Feedback, Safety Assessments, Problem Solving and Education    Response to Treatment Strategies:  Accepted Feedback, Gave Feedback, Listened, Focused on Goals, Attentive, Accepted Support and Alert    Name of Group:  Group Psychotherapy, Coping with Depression  Progress Note  Hour 1: Katerina reported that she went to another concert and out for lunch with her friends last Friday. She indicated that she had a good time, but also reiterated that she was anxious the whole time. She reported that her hands have shaking, but on days that she comes to 55+, she doesn't seem to have that problem. She continues to do her yoga in the morning and that also helps alleviate the shakiness. While she reports that she is doing things that she enjoys, she will also insist that her anxiety is quite extreme and there is little distraction. She is scheduled to meet with a new psychiatrist to exam her medications.  Hour 2: The group shared their mutual experiences with fighting through anxiety to accomplish something and then the next time they faced the same obstacle, it was a little bit easier, because they new they had already accomplished it once before.   Coping with Depression: The group read and discussed the handout \"Making Changes\". The handout outlined specific and tangible ways to make changes in one's life. It stressed the importance of the goals being attainable, realistic, and measurable. The group then discussed ways that they may have used these methods in their life and the effectiveness of this approach.      Is " this a Weekly Review of the Progress on the Treatment Plan?  Yes.      Are Treatment Plan Goals being addressed?  Yes, continue treatment goals      Are Treatment Plan Strategies to Address Goals Effective?  Yes, continue treatment strategies      Are there any current contracts in place?  Yes, safety.

## 2017-11-28 ENCOUNTER — HOSPITAL ENCOUNTER (OUTPATIENT)
Dept: BEHAVIORAL HEALTH | Facility: CLINIC | Age: 72
End: 2017-11-28
Attending: NURSE PRACTITIONER
Payer: MEDICARE

## 2017-11-28 PROCEDURE — H2012 BEHAV HLTH DAY TREAT, PER HR: HCPCS

## 2017-11-28 NOTE — PROGRESS NOTES
"  Adult Mental Health Outpatient Group Therapy Progress Note     Client Initial Individualized Goals for Treatment:Katerina verbalizes the following treatment/discharge goals: \"To decrease the fear of each day. Increase my self confidence again. To be able to face the day. Decrease anxiety symptoms\".      See Initial Treatment suggestions for the client during the time between Diagnostic Assessment and completion of the Master Individualized Treatment Plan.    Treatment Goals:     see above     Area of Treatment Focus:  Symptom Management and Develop Socialization / Interpersonal Relationship Skills    Therapeutic Interventions/Treatment Strategies:  Support, Feedback, Structured Activity, Clarification and Education    Response to Treatment Strategies:  Accepted Feedback, Gave Feedback, Listened, Focused on Goals, Attentive, Accepted Support and Alert    Name of Group: Group Psychotherapy    Description and Outcome   Hour 1: Katerina shared with the group that she is doing \"terrible, as bad as ever\". She stated that she begins every day telling herself, \"another day to deal with this anxiety and shakiness, I can't take it\". She was strongly encouraged to try and change that script for herself. As long as she decides that she is going to have a bad day from the very start, she more than likely, will have a bad. The group pointed a number of positive things she has accomplished, yet she minimizes this. She agrees that she only focuses on the negative and unless someone has something for her to do, she feels lost and frets and paces around the house. She was strongly encouraged to make plans of her own and get out of the house.  Hour 2: The group discussed the common theme of how to expand their world. Depression and anxiety has caused the group to isolate more and more, decreasing the size of their world. We explored ways to push through the anxiety and increase social contact and ways that group members had been " successful in doing so.      Is this a Weekly Review of the Progress on the Treatment Plan?  No

## 2017-11-29 NOTE — PROGRESS NOTES
Psychiatry staffing: case discussed  Diagnosis: Depression, Anxiety. Continues to focus on the negative and does not acknowledge when something positive happen.     Current Outpatient Prescriptions   Medication     OLANZapine (ZYPREXA PO)     GABAPENTIN PO     OLANZapine (ZYPREXA PO)     Divalproex Sodium (DEPAKOTE PO)     MELATONIN PO     clonazePAM (KLONOPIN) 0.5 MG tablet     multivitamin, therapeutic (THERA-VIT) TABS     calcium carbonate (OS- MG [place]. [place]) 500 MG tablet     No current facility-administered medications for this encounter.      Past Medical History:   Diagnosis Date     Arthritis     oesteoporosis and OA-hands     Cancer (H) 2015    skin     Depressive disorder     Bipolar

## 2017-11-29 NOTE — PROGRESS NOTES
"  Adult Mental Health Outpatient Group Therapy Progress Note     Client Initial Individualized Goals for Treatment:Katerina verbalizes the following treatment/discharge goals: \"To decrease the fear of each day. Increase my self confidence again. To be able to face the day. Decrease anxiety symptoms\".      See Initial Treatment suggestions for the client during the time between Diagnostic Assessment and completion of the Master Individualized Treatment Plan.    Treatment Goals:  . Will plan and problem-solve to return to previous socia, enjoyable activities and decrease lonliness and isolation  Will plan and problem-solve to decrease fears and anxiety and increase self confidence  Client will notify staff when needing assistance to develop or implement a coping plan to manage suicidal or self injurious urges.       Area of Treatment Focus:  Symptom Management and Develop Socialization / Interpersonal Relationship Skills    Therapeutic Interventions/Treatment Strategies:  Support, Feedback, Structured Activity, Clarification and Education    Response to Treatment Strategies:  Accepted Feedback, Gave Feedback, Listened, Focused on Goals, Attentive, Accepted Support and Alert    Name of Group:  Mental health management     Description and Outcome:  The group was provided with a guided breathing and warmup structure with focus on increasing self awareness and on providing an embodied experience.  Discussion included the importance of listening to body cues as a way of identifying emotion as well as accompanying needs and wants, and as a way of practising self compassion, authenticity, mindfulness, self expression,  and connection to other.  The group developed a movement inspired by image of \"enough\" and image of taking in.  Discussion around need to use strength to allow space to take in and also the impact of allowing oneself to receive.  Katerina was engaged.  She initially reported anxiety, experienced in her throat at " beginning of group.  She reported a decrease in anxiety at end of group and was aware of the importance of staying present focused.    Is this a Weekly Review of the Progress on the Treatment Plan?  No

## 2017-12-01 ENCOUNTER — HOSPITAL ENCOUNTER (OUTPATIENT)
Dept: BEHAVIORAL HEALTH | Facility: CLINIC | Age: 72
End: 2017-12-01
Attending: NURSE PRACTITIONER
Payer: MEDICARE

## 2017-12-01 PROCEDURE — H2012 BEHAV HLTH DAY TREAT, PER HR: HCPCS

## 2017-12-01 NOTE — PROGRESS NOTES
"Group Therapy Progress Notes      Client Initial Individualized Goals for Treatment:Katerina verbalizes the following treatment/discharge goals: \"To decrease the fear of each day. Increase my self confidence again. To be able to face the day. Decrease anxiety symptoms\".      Area of Treatment Focus:  Symptom Management, Personal Safety and Physical Health     Therapeutic Interventions/Treatment Strategies:  Support, Feedback, Safety Assessments, Problem Solving and Education    Response to Treatment Strategies:  Accepted Feedback, Gave Feedback, Listened, Focused on Goals, Attentive, Accepted Support and Alert    Name of Group:  Group Psychotherapy  Progress Note  Hour 1: Katerina shared with the group that she is practicing reframing situations to focus on the positive, rather than the negative. She is finding that it is difficult but is helpful in starting out her day. She reports that she is still a bit tremulous in the morning but not so much. She has plans to get together with her sister to bake cookies again, and reports that she is making plans to keep herself occupied while her  is watching football.  Hour 2: The group discussed the common theme of how to deal with anxiety. Also the importance of changing behaviors before expecting a \"feeling\" to change. It is imperative to begin new behaviors in order to become comfortable enough with the desired behaviors, for the feelings associated with that behavior change to also change.      Description and Outcome:  Client verbalized understanding of session content by discussing with the group those feelings they wished to change. Also by sharing what behavior changes have been effective in implementing these changes.      Is this a Weekly Review of the Progress on the Treatment Plan?  Yes.      Are Treatment Plan Goals being addressed?  Yes, continue treatment goals      Are Treatment Plan Strategies to Address Goals Effective?  Yes, continue treatment " strategies      Are there any current contracts in place?  Yes, safety.

## 2017-12-01 NOTE — PROGRESS NOTES
"Group Therapy Progress Notes      Client Initial Individualized Goals for Treatment:Katerina verbalizes the following treatment/discharge goals: \"To decrease the fear of each day. Increase my self confidence again. To be able to face the day. Decrease anxiety symptoms\".        See Initial Treatment suggestions for the client during the time between Diagnostic Assessment and completion of the Master Individualized Treatment Plan.     Treatment Goals:  . Will plan and problem-solve to return to previous socia, enjoyable activities and decrease lonliness and isolation  Will plan and problem-solve to decrease fears and anxiety and increase self confidence  Client will notify staff when needing assistance to develop or implement a coping plan to manage suicidal or self injurious urges.         Area of Treatment Focus:  Develop Socialization / Interpersonal Relationship Skills    Therapeutic Interventions/Treatment Strategies:  Support, Structured Activity, Problem Solving, Clarification and Education    Response to Treatment Strategies:  Accepted Feedback, Gave Feedback, Listened, Focused on Goals, Attentive, Accepted Support and Alert    Name of Group:  Mental health management    Progress Note  Client participated in an education group on managing guilt and shame.  Writer presented definitions, physical affects, changes in cognition, and additional feelings associated with guilt and shame.  Writer presented a plan for managing emotions and changing behavior to reduce shame and guilt.  Client provided feedback and ideas to others.  Client demonstrated understanding of session content by engaging in problem solving.          Is this a Weekly Review of the Progress on the Treatment Plan?  Yes.      Are Treatment Plan Goals being addressed?  Yes, continue treatment goals      Are Treatment Plan Strategies to Address Goals Effective?  Yes, continue treatment strategies      Are there any current contracts in place?  No         "

## 2017-12-01 NOTE — PROGRESS NOTES
"  Adult Mental Health Outpatient Group Therapy Progress Note     Client Initial Individualized Goals for Treatment:Katerina verbalizes the following treatment/discharge goals: \"To decrease the fear of each day. Increase my self confidence again. To be able to face the day. Decrease anxiety symptoms\".      See Initial Treatment suggestions for the client during the time between Diagnostic Assessment and completion of the Master Individualized Treatment Plan.    Treatment Goals:  . Will plan and problem-solve to return to previous socia, enjoyable activities and decrease lonliness and isolation  Will plan and problem-solve to decrease fears and anxiety and increase self confidence  Client will notify staff when needing assistance to develop or implement a coping plan to manage suicidal or self injurious urges.       Area of Treatment Focus:  Symptom Management and Develop Socialization / Interpersonal Relationship Skills    Therapeutic Interventions/Treatment Strategies:  Support, Feedback, Clarification and Education    Response to Treatment Strategies:  Accepted Feedback, Gave Feedback, Listened, Focused on Goals, Attentive, Accepted Support and Alert    Name of Group:  Mental health management & Wellness    Description and Outcome:  Facilitated discussion ( with worksheet) on Radical Acceptance. Defined the term, then had discussion based on discussion questions on worksheet. All verbalized some understanding of this concept and offered examples of how they can continue to use it especially when feeling defeated or discouraged.Katerina has been working on using more life affirming statements around feeling anxious. When she can accept anxiety and use phrases she reports feeling better but is not always able to use phrases.   Is this a Weekly Review of the Progress on the Treatment Plan?  No        "

## 2017-12-05 ENCOUNTER — HOSPITAL ENCOUNTER (OUTPATIENT)
Dept: BEHAVIORAL HEALTH | Facility: CLINIC | Age: 72
End: 2017-12-05
Attending: NURSE PRACTITIONER
Payer: MEDICARE

## 2017-12-05 PROCEDURE — H2012 BEHAV HLTH DAY TREAT, PER HR: HCPCS

## 2017-12-05 PROCEDURE — 99214 OFFICE O/P EST MOD 30 MIN: CPT | Performed by: NURSE PRACTITIONER

## 2017-12-05 NOTE — PROGRESS NOTES
"[facility]   Psychiatric Progress Note        Interim History:   From H&P: Katerina Crump is a 71 year old female referred to the [facility] by herself. This is her second time in the program and she finds it quiet helpful. Mrs. Crump has a history of bipolar disorder, however she is disputing this diagnosis saying that she has never been manic or hypomanic, she is usually depressed and anxious. States she was first diagnosed with depression at age 16, after one of her friends past away. She has been hospitalized twice, once in the 70's and once in 2015 at [facility]. She has never attempted suicide and has not had ECT. Her PCP is Dr. Coleman and her psychiatrist is Dr. Oneal whom she saw in April and will see again July 3rd. Current episode of depression and anxiety started in the spring of 2015 after a sergery to remove squamous cell carcinoma. She became very depressed, worried, unable to sleep, relax, eat or function in any way. She was contemplating suicide. She was hospitalized for 3 weeks at [facility] under Dr. Pereyra. She was discharged on regiment of Depakote, Klonopin Zyprexa and Vistaril. She was relatively stable until March of this year. She was scheduled to have an injection in March for osteoarthritis but does not feel that this has been stressful for her. She can't identify any stressors that may have contributed to her current mental states. Current symptoms include: negative thinking/image, high anxiety, poor sleep and appetite (lost 8 lbs this month), shakiness, racing thoughts, restlessness, fear of being alone, depression, and inability to function. She denies SI, SIB. She is avoiding her family and friends \"I don't want to be seen this way\". Her psychiatrist recently increased Depakote to a 1000 mg a day but she is not feeling much better.  Denies panic attacks, hallucinations, delusions, suicidal and homicidal ideation, self injuries " "behaviors, memory problems, obsessions, compulsions, specific fears, and manic symptoms. She is wondering if she needs to be hospitalised but states she hated the experience and does not want to go to the hospital.   The client's care was discussed with the treatment team during the daily team meeting and/or staff's chart notes were reviewed.  Staff report Katerina continues to be anxious and focusing on the negative. She has difficulties identifying anything positive in her life.  She is attending groups and participating appropriately.    Met with client for follow up. Continues to feel very anxious, states nothing worked so far. She is discouraged. Spends her days laying on the couch, does some house chores and go out once in a while. Denies SI, SIB. Depression is moderate. Denies manic and psychotic symptoms. Sleeps 8-9 hours each night. Believes appetite is good \"I always have trouble gaining weight\". Memory and concentration are good, however appear forgetful. She sees her psychiatrist every 2 weeks for med adjustment. We discussed ECT treatment, she does not want to go with it for now. Discussed medications changes. States she is now on Cogentin but wasn't sure about the dose. Propranolol was decreased to \"half a tablet at bed time\".  She saw her psychiatrist 2 weeks ago who refer her to another psychiatrist for a 2nd opinion which will ocure next week. She is also seeing a NP tomorrow \"a medication specialist, to go over my medication\".            Medications:          Allergies:     Allergies   Allergen Reactions     Keflex [Cephalexin]      Nausea hard to eat          Labs:   No results found for this or any previous visit (from the past 672 hour(s)).         Psychiatric Examination:                      /49  Pulse 75  Temp 98.2  F (36.8  C) (Oral)  Ht 1.6 m (5' 3\")  Wt 48.1 kg (106 lb)  BMI 18.78 kg/m2     Appearance: well groomed, awake, alert, cooperative, mild distress and thin  Attitude:  " cooperative  Eye Contact:  good  Mood:  anxious  Affect:  mood congruent  Speech:  clear, coherent  Psychomotor Behavior:  no evidence of tardive dyskinesia, dystonia, or tics  Throught Process:  logical and goal oriented  Associations:  no loose associations  Thought Content:  no evidence of suicidal ideation or homicidal ideation, no auditory hallucinations present and no visual hallucinations present  Insight:  fair  Judgement:  fair  Oriented to:  time, person, and place  Attention Span and Concentration:  intact  Recent and Remote Memory:  fair         Precautions:           DIagnoses:   1. Bipolar Effective Disorder  2. Generalized Anxiety Disorder, recurent, severe       Plan:     1. Continue [facility].   2. Continue current medications. I recommend:  ---Switch from Depakote ER, qhs, to immediate release, TID for better control of her anxiety. ---Check thyroid function; hyperthyroidism may cause high anxiety/agitation.   ---May use small doses 12.5-25 mg of Seroquel for anxiety and sleep.   ---Klonopin may need to be increased.   ---Seeing a therapist on weekly basis will be beneficial.   ---ECT might be a good option.   3. The patient was encourage to follow up with PCP and Psychiatrist.   4. The patient was advised to let us know if inpatient admission or further help is needed.  5. The patient was advised to get involved in the community in order to continues to improve. Care was coordinated with the treatment team.     Celso GURROLA CNP  Date: 12/05/17  Time: 12:24 PM

## 2017-12-06 NOTE — PROGRESS NOTES
"  Adult Mental Health Outpatient Group Therapy Progress Note     Client Initial Individualized Goals for Treatment:Katerina verbalizes the following treatment/discharge goals: \"To decrease the fear of each day. Increase my self confidence again. To be able to face the day. Decrease anxiety symptoms\".      See Initial Treatment suggestions for the client during the time between Diagnostic Assessment and completion of the Master Individualized Treatment Plan.    Treatment Goals:  . Will plan and problem-solve to return to previous socia, enjoyable activities and decrease lonliness and isolation  Will plan and problem-solve to decrease fears and anxiety and increase self confidence  Client will notify staff when needing assistance to develop or implement a coping plan to manage suicidal or self injurious urges.     Area of Treatment Focus:  Symptom Management and Develop Socialization / Interpersonal Relationship Skills    Therapeutic Interventions/Treatment Strategies:  Support, Feedback, Structured Activity and Clarification    Response to Treatment Strategies:  Accepted Feedback, Gave Feedback, Listened, Focused on Goals, Attentive, Accepted Support and Alert    Name of Group:  Mental health mangement     Description and Outcome:  The group was provided with a guided breathing and warmup structure with focus on increasing self awareness and on providing an embodied experience.  Discussion included the importance of listening to body cues as a way of identifying emotion as well as accompanying needs and wants, and as a way of practising self compassion, authenticity, mindfulness, self expression,  and connection to other.  The group explored image of \"getting rid of anxiety\" noting that they most connected with image of \"throwing it away.\"  The group explored recovery, each developing own movement gesture which was then shared and reflected back by the group.  Discussion around use of creativity in building something " new and the use of mirroring in building sense of connection.  Katerina was engaged.  She shared that she found exploring creativity as encouraging, reminding her of the possibility of something new.    Is this a Weekly Review of the Progress on the Treatment Plan?  No

## 2017-12-06 NOTE — PROGRESS NOTES
"  Adult Mental Health Outpatient Group Therapy Progress Note     Client Initial Individualized Goals for Treatment:Katerina verbalizes the following treatment/discharge goals: \"To decrease the fear of each day. Increase my self confidence again. To be able to face the day. Decrease anxiety symptoms\".      See Initial Treatment suggestions for the client during the time between Diagnostic Assessment and completion of the Master Individualized Treatment Plan.    Treatment Goals:     see above     Area of Treatment Focus:  Symptom Management and Develop Socialization / Interpersonal Relationship Skills    Therapeutic Interventions/Treatment Strategies:  Support, Feedback, Structured Activity, Clarification and Education    Response to Treatment Strategies:  Accepted Feedback, Gave Feedback, Listened, Focused on Goals, Attentive, Accepted Support and Alert    Name of Group: Group Psychotherapy, Mental Health Management    Description and Outcome   Hour 1: Katerina stated to the group that she was feeling overwhelmed with anxiety today. She had decided to end with her CBT therapist and return to her original therapist, Estefania. She now was doubting her decision after reading the complete note her CBT therapist gave her. She was in a quandary, fearing that her  would be angry, if she terminated with Estefania. She continues to be in the same pattern of focusing on some anxiety provoking event, to the exclusion of all else.   Hour 2: The group discussed the common issue of trying to manage anxiety, identifying the triggers, and what has been effective in alleviating the symptoms of anxiety, particularly associated with depression.      Mental Health Management: The group discussed the handout \"Change the Channel\" by Viet Rodriguez. In it, it describes multiple ways to take some action and interrupt a cycle of rumination. It also helps to reinforce the benefit of taking initiative on one's own behalf and being proactive, and so " having a sense of at least a bit more control of their life.       Is this a Weekly Review of the Progress on the Treatment Plan?  No

## 2017-12-08 ENCOUNTER — HOSPITAL ENCOUNTER (OUTPATIENT)
Dept: BEHAVIORAL HEALTH | Facility: CLINIC | Age: 72
End: 2017-12-08
Attending: NURSE PRACTITIONER
Payer: MEDICARE

## 2017-12-08 PROCEDURE — H2012 BEHAV HLTH DAY TREAT, PER HR: HCPCS

## 2017-12-08 NOTE — PROGRESS NOTES
Group Therapy Progress Notes     Area of Treatment Focus:  Symptom Management    Therapeutic Interventions/Treatment Strategies:  Support, Feedback and Structured Activity    Response to Treatment Strategies:  Accepted Feedback, Gave Feedback, Listened, Focused on Goals, Attentive, Accepted Support and Alert    Name of Group:  Mental health management      Progress Note  Facilitated discussion and experiential component of meditation. Defined the following concepts: embodiment,  negative bias, eco-therapy. After discussion concepts all verbalized understanding these, then moved into facilitation of meditation. Spent several minutes processing experience, and discussing how they could bring a meditation practice into their individual lives.              Is this a Weekly Review of the Progress on the Treatment Plan?  No

## 2017-12-08 NOTE — PROGRESS NOTES
"Group Therapy Progress Notes      Client Initial Individualized Goals for Treatment:Katerina verbalizes the following treatment/discharge goals: \"To decrease the fear of each day. Increase my self confidence again. To be able to face the day. Decrease anxiety symptoms\".      Area of Treatment Focus:  Symptom Management, Personal Safety and Physical Health     Therapeutic Interventions/Treatment Strategies:  Support, Feedback, Safety Assessments, Problem Solving and Education    Response to Treatment Strategies:  Accepted Feedback, Gave Feedback, Listened, Focused on Goals, Attentive, Accepted Support and Alert    Name of Group:  Group Psychotherapy  Progress Note  Hour 1: Katerina reported to the group that she continues to be overwhelmed with anxiety. She stated that she cannot find things to do and simply paces around at home, becoming more anxious. She will say that one day was just fine and she was very busy, then go on to say \"but yesterday was terrible\". She states that she really has no interests and cannot recall what she has done for fun. When it was pointed out that she likes to golf and that maybe she could consider going to an indoor driving, she rejects the idea flatly, stating \"that doesn't sound at all appealing\". Every suggestion seems to be discounted with the exception of possibly going for a walk with her daughter.  Hour 2: The group once again focused on the ongoing theme of being stuck and the difficulty of fighting through anxiety. The lesson overall seemed to be that you can't wait until you feel better to act, but your actions will help you to feel better.     Description and Outcome:  Client verbalized understanding of session content by discussing with the group those feelings they wished to change. Also by sharing what behavior changes have been effective in implementing these changes.      Is this a Weekly Review of the Progress on the Treatment Plan?  Yes.      Are Treatment Plan Goals being " addressed?  Yes, continue treatment goals      Are Treatment Plan Strategies to Address Goals Effective?  Yes, continue treatment strategies      Are there any current contracts in place?  Yes, safety.

## 2017-12-08 NOTE — PROGRESS NOTES
Group Therapy Progress Notes     Area of Treatment Focus:  Symptom Management    Therapeutic Interventions/Treatment Strategies:  Support, Feedback and Structured Activity    Response to Treatment Strategies:  Accepted Feedback, Gave Feedback, Listened, Focused on Goals, Attentive, Accepted Support and Alert    Name of Group:  Coping with Depression and Anxiety    Progress Note  Client learned about anxiety and panic reduction techniques. Client demonstrated understanding of the material by  participating in the discussion. She talked about her unending anxiety, but agreed to try some of the techniques.          Is this a Weekly Review of the Progress on the Treatment Plan?  No

## 2017-12-12 ENCOUNTER — HOSPITAL ENCOUNTER (OUTPATIENT)
Dept: BEHAVIORAL HEALTH | Facility: CLINIC | Age: 72
End: 2017-12-12
Attending: NURSE PRACTITIONER
Payer: MEDICARE

## 2017-12-12 PROCEDURE — H2012 BEHAV HLTH DAY TREAT, PER HR: HCPCS

## 2017-12-12 NOTE — PROGRESS NOTES
"  Adult Mental Health Outpatient Group Therapy Progress Note     Client Initial Individualized Goals for Treatment:Katerina verbalizes the following treatment/discharge goals: \"To decrease the fear of each day. Increase my self confidence again. To be able to face the day. Decrease anxiety symptoms\".      See Initial Treatment suggestions for the client during the time between Diagnostic Assessment and completion of the Master Individualized Treatment Plan.    Treatment Goals:     see above     Area of Treatment Focus:  Symptom Management and Develop Socialization / Interpersonal Relationship Skills    Therapeutic Interventions/Treatment Strategies:  Support, Feedback, Structured Activity, Clarification and Education    Response to Treatment Strategies:  Accepted Feedback, Gave Feedback, Listened, Focused on Goals, Attentive, Accepted Support and Alert    Name of Group: Group Psychotherapy  Description and Outcome   Hour 1: Katerina shared with the group that she is doing well. She reported that she had a good weekend and kept busy when her  was working. She watched the football game with him, even though she doesn't care for football. She mostly enjoyed sitting close together and holding hands. She was going to meet with a NP this afternoon to review some genetic testing to try and pinpoint some medications that would be most effective for her. She is optimistic, but guarded about this. She reported that she did have one bad day since we met last but was not going to talk about it because she is trying to focus only on the positive.    Hour 2: The group discussed the common problems that they are experiencing with anxiety and how they can deal with it. There is also a component of the upcoming holidays and ensuing family gatherings that are exacerbating this problem. There has also been some feelings of shame or guilt expressed around the thought that being with one's own family makes them anxious.     The client " expressed an understanding of the material presented by participating actively in the exercise and sharing and processing with the group their experience.      Is this a Weekly Review of the Progress on the Treatment Plan?  No

## 2017-12-15 ENCOUNTER — HOSPITAL ENCOUNTER (OUTPATIENT)
Dept: BEHAVIORAL HEALTH | Facility: CLINIC | Age: 72
End: 2017-12-15
Attending: NURSE PRACTITIONER
Payer: MEDICARE

## 2017-12-15 PROCEDURE — H2012 BEHAV HLTH DAY TREAT, PER HR: HCPCS

## 2017-12-15 NOTE — PROGRESS NOTES
"  Adult Mental Health Outpatient Group Therapy Progress Note     Client Initial Individualized Goals for Treatment:Katerina verbalizes the following treatment/discharge goals: \"To decrease the fear of each day. Increase my self confidence again. To be able to face the day. Decrease anxiety symptoms\".      See Initial Treatment suggestions for the client during the time between Diagnostic Assessment and completion of the Master Individualized Treatment Plan.    Treatment Goals:  . Will plan and problem-solve to return to previous socia, enjoyable activities and decrease lonliness and isolation  Will plan and problem-solve to decrease fears and anxiety and increase self confidence  Client will notify staff when needing assistance to develop or implement a coping plan to manage suicidal or self injurious urges.       Area of Treatment Focus:  Symptom Management and Develop Socialization / Interpersonal Relationship Skills    Therapeutic Interventions/Treatment Strategies:  Support, Feedback, Structured Activity, Clarification and Education    Response to Treatment Strategies:  Accepted Feedback, Gave Feedback, Listened, Focused on Goals, Attentive, Accepted Support and Alert    Name of Group:  Mental health management     Description and Outcome:  The group was provided with a guided breathing and warmup structure with focus on increasing self awareness and on providing an embodied experience.  Discussion included the importance of listening to body cues as a way of identifying emotion as well as accompanying needs and wants, and as a way of practising self compassion, authenticity, mindfulness, self expression,  and connection to other.  The group developed a theme around receiving attention.  Group members were supported in their asking for attention and were encouraged to think of themselves of being important enough.  Katerina was engaged.  She shared that it felt good to receive attention, as she feels she gets little " from her .  Reported anxiety decreased during session.    Is this a Weekly Review of the Progress on the Treatment Plan?  No

## 2017-12-15 NOTE — PROGRESS NOTES
"Group Therapy Progress Notes      Client Initial Individualized Goals for Treatment:Katerian verbalizes the following treatment/discharge goals: \"To decrease the fear of each day. Increase my self confidence again. To be able to face the day. Decrease anxiety symptoms\".      Area of Treatment Focus:  Symptom Management, Personal Safety and Physical Health     Therapeutic Interventions/Treatment Strategies:  Support, Feedback, Safety Assessments, Problem Solving and Education    Response to Treatment Strategies:  Accepted Feedback, Gave Feedback, Listened, Focused on Goals, Attentive, Accepted Support and Alert    Name of Group:  Group Psychotherapy  Progress Note  Hour 1: Katerina shared with the group that she has been doing better and has been trying to keep her schedule full. She has been finding that this has been helpful with managing her anxiety. However, as soon as she says that she's doing well, she qualifies that with something that caused a problem. However, she does not get into it as much as should did previously. She was disappointed with her genetic testing because it really didn't provide her with any concrete information. She did note that she is now taken off of the Depakote and is hopefull that this will diminish her shakiness.  Hour 2: The group all expressed difficulty getting out of the house either because of depression, anxiety, or both. These symptoms are increasing as the [place] holiday season nears and expectations rise. The group again revisited the approach of having to take some action, not wait for their mood to change first. They explored ways to support each other in \"just getting out the door\".     Description and Outcome:  Client verbalized understanding of session content by discussing with the group those feelings they wished to change. Also by sharing what behavior changes have been effective in implementing these changes.        Is this a Weekly Review of the Progress on the " Treatment Plan?  Yes.      Are Treatment Plan Goals being addressed?  Yes, continue treatment goals      Are Treatment Plan Strategies to Address Goals Effective?  Yes, continue treatment strategies      Are there any current contracts in place?  Yes, safety.

## 2017-12-15 NOTE — PROGRESS NOTES
Adult Mental Health Outpatient Group Therapy Progress Note       See Initial Treatment suggestions for the client during the time between Diagnostic Assessment and completion of the Master Individualized Treatment Plan.    Treatment Goals:     Will plan and problem-solve to return to previous socia, enjoyable activities and decrease lonliness and isolation  Will plan and problem-solve to decrease fears and anxiety and increase self confidence                       Status: Active  Client will notify staff when needing assistance to develop or implement a coping plan to manage suicidal or self injurious urges.       Area of Treatment Focus:  Symptom Management    Therapeutic Interventions/Treatment Strategies:  Support, Feedback and Education    Response to Treatment Strategies:  Accepted Feedback, Gave Feedback, Listened, Focused on Goals, Attentive, Accepted Support and Alert    Name of Group:  Coping with Depression     Description and Outcome:  Client participated in a discussion about messages received from society about the holidays and ways to cope with these pressures while depressed/anxious. Client demonstrated understanding of content by fully participating in discussion and creating a self-care plan.    Is this a Weekly Review of the Progress on the Treatment Plan?  No

## 2017-12-19 ENCOUNTER — HOSPITAL ENCOUNTER (OUTPATIENT)
Dept: BEHAVIORAL HEALTH | Facility: CLINIC | Age: 72
End: 2017-12-19
Attending: NURSE PRACTITIONER
Payer: MEDICARE

## 2017-12-19 PROCEDURE — H2012 BEHAV HLTH DAY TREAT, PER HR: HCPCS

## 2017-12-19 NOTE — PROGRESS NOTES
Adult Mental Health Outpatient Group Therapy Progress Note       See Initial Treatment suggestions for the client during the time between Diagnostic Assessment and completion of the Master Individualized Treatment Plan.    Treatment Goals:     Will plan and problem-solve to return to previous socia, enjoyable activities and decrease lonliness and isolation  Will plan and problem-solve to decrease fears and anxiety and increase self confidence                       Status: Active  Client will notify staff when needing assistance to develop or implement a coping plan to manage suicidal or self injurious urges.       Area of Treatment Focus:  Symptom Management    Therapeutic Interventions/Treatment Strategies:  Support, Feedback, Structured Activity and Education    Response to Treatment Strategies:  Accepted Feedback, Gave Feedback, Listened, Focused on Goals, Attentive, Accepted Support and Alert    Name of Group:  Coping with Depression      Description and Outcome:  Client learned about the [place]  (those places in the world where people live to be very old, yet remain very healthy) and the commonalities those places share. Client participated in a discussion about ways to incorporate those common lifestyle practices into her own life. Client demonstrated an understanding of the material by joining in the discussion and identifying lifestyle changes to make.      Is this a Weekly Review of the Progress on the Treatment Plan?  No

## 2017-12-19 NOTE — PROGRESS NOTES
"  Adult Mental Health Outpatient Group Therapy Progress Note     Client Initial Individualized Goals for Treatment:Katerina verbalizes the following treatment/discharge goals: \"To decrease the fear of each day. Increase my self confidence again. To be able to face the day. Decrease anxiety symptoms\".      See Initial Treatment suggestions for the client during the time between Diagnostic Assessment and completion of the Master Individualized Treatment Plan.    Treatment Goals:     see above     Area of Treatment Focus:  Symptom Management and Develop Socialization / Interpersonal Relationship Skills    Therapeutic Interventions/Treatment Strategies:  Support, Feedback, Structured Activity, Clarification and Education    Response to Treatment Strategies:  Accepted Feedback, Gave Feedback, Listened, Focused on Goals, Attentive, Accepted Support and Alert    Name of Group:   Hour 1: Katerina shared with the group that she had a good week end. She indicated that she kept busy walking with her daughter and going to a friends house for lunch. She indicated that anxiety was constantly present but was not debilitating. She stressed that it is important for her to push through the anxiety to get things done or there will be no change for her. She is planning to have things in place for the next couple of days because her  will be working,so she needs to have some things to do.  Hour 2: The group shared their common experiences with anxiety getting in their way to achieve things that they know they need to do to make the changes in their lives, that they need to make.     Description and Outcome:  The client expressed an understanding of the material presented by participating actively in the exercise, while sharing and processing with the group the experience of struggling with anxiety.      Is this a Weekly Review of the Progress on the Treatment Plan?  No  "

## 2017-12-20 NOTE — PROGRESS NOTES
"  Adult Mental Health Outpatient Group Therapy Progress Note     Client Initial Individualized Goals for Treatment:Katerina verbalizes the following treatment/discharge goals: \"To decrease the fear of each day. Increase my self confidence again. To be able to face the day. Decrease anxiety symptoms\".      See Initial Treatment suggestions for the client during the time between Diagnostic Assessment and completion of the Master Individualized Treatment Plan.    Treatment Goals:  . Will plan and problem-solve to return to previous socia, enjoyable activities and decrease lonliness and isolation  Will plan and problem-solve to decrease fears and anxiety and increase self confidence  Client will notify staff when needing assistance to develop or implement a coping plan to manage suicidal or self injurious urges.       Area of Treatment Focus:  Symptom Management and Develop Socialization / Interpersonal Relationship Skills    Therapeutic Interventions/Treatment Strategies:  Support, Feedback, Structured Activity, Clarification and Education    Response to Treatment Strategies:  Accepted Feedback, Gave Feedback, Listened, Focused on Goals, Attentive, Accepted Support and Alert    Name of Group:  Mental health management     Description and Outcome:  The group was provided with a guided breathing and warmup structure with focus on increasing self awareness and on providing an embodied experience.  Discussion included the importance of listening to body cues as a way of identifying emotion as well as accompanying needs and wants, and as a way of practising self compassion, authenticity, mindfulness, self expression,  and connection to other.  The group explored use of strength in movement which the group described as building a sense of self empowerment.  Katerina was engaged.  She shared that she felt hopeful after experiencing sense of power.    Is this a Weekly Review of the Progress on the Treatment Plan?  No        "

## 2017-12-21 ENCOUNTER — TRANSFERRED RECORDS (OUTPATIENT)
Dept: HEALTH INFORMATION MANAGEMENT | Facility: CLINIC | Age: 72
End: 2017-12-21

## 2017-12-22 ENCOUNTER — HOSPITAL ENCOUNTER (OUTPATIENT)
Dept: BEHAVIORAL HEALTH | Facility: CLINIC | Age: 72
End: 2017-12-22
Attending: NURSE PRACTITIONER
Payer: MEDICARE

## 2017-12-22 PROCEDURE — H2012 BEHAV HLTH DAY TREAT, PER HR: HCPCS

## 2017-12-22 NOTE — PROGRESS NOTES
Group Therapy Progress Notes     Area of Treatment Focus:  Symptom Management    Therapeutic Interventions/Treatment Strategies:  Support, Feedback and Structured Activity    Response to Treatment Strategies:  Accepted Feedback, Gave Feedback, Listened, Focused on Goals, Attentive, Accepted Support and Alert    Name of Group:  Mental health management/Wellness      Progress Note:  Facilitated group using rubber ball as interactive device, gave instruction to toss ball to another member with a loving kindness phrase. Ended session with guided meditation for grounding, calming and again bringing in loving kindness. All participated.           Is this a Weekly Review of the Progress on the Treatment Plan?  No

## 2017-12-22 NOTE — PROGRESS NOTES
Group Therapy Progress Notes     Area of Treatment Focus:  Symptom Management    Therapeutic Interventions/Treatment Strategies:  Support, Feedback and Structured Activity    Response to Treatment Strategies:  Accepted Feedback, Gave Feedback, Listened, Focused on Goals, Attentive, Accepted Support and Alert    Name of Group:  Mental health management      Progress Note:  Discussed one of the roles of group is to provide mutual aid to members. Group able to define mutual aid as offering support. Facilitated exercise each member offered a statement of support to one other member. Then discussed using internal resources as strategy to cope. Identified 1 resource as positive memories. Katerina shared memory of first date with her  when she was 24. How she knew she would  him pretty early on in dating him.             Is this a Weekly Review of the Progress on the Treatment Plan?  No

## 2017-12-22 NOTE — PROGRESS NOTES
"  Adult Mental Health Outpatient Group Therapy Progress Note       See Initial Treatment suggestions for the client during the time between Diagnostic Assessment and completion of the Master Individualized Treatment Plan.    Treatment Goals:     Will plan and problem-solve to return to previous socia, enjoyable activities and decrease lonliness and isolation  Will plan and problem-solve to decrease fears and anxiety and increase self confidence                       Status: Active  Client will notify staff when needing assistance to develop or implement a coping plan to manage suicidal or self injurious urges.       Area of Treatment Focus:  Symptom Management, Community Resources/Discharge Planning and Develop Socialization / Interpersonal Relationship Skills    Therapeutic Interventions/Treatment Strategies:  Support, Feedback, Structured Activity, Problem Solving, Clarification and Education    Response to Treatment Strategies:  Accepted Feedback, Gave Feedback, Listened, Focused on Goals, Attentive, Accepted Support and Alert    Name of Group:  Psychotherapy      Description and Outcome:  In session one client reports feeling good about spending two days alone while her  worked. Last night however she got only one hour of sleep due to extreme anxiety. She reports obsessing about \"what would I do if my car broke down\". Client states that she could not stop ruminating about this until she talked to her  in the morning about who to call, should this happen. Client is very worried about [place] and wishes it was over. She reviewed her plans, but denies that this helped. She wonders if her anxiety has increased due to her meds being decreased. Meds are being decreased due to a tremor. She denies suicidal ideation.  In session two a common theme of sleep problems were noted. Sleep hygiene techniques were reviewed.  Client demonstrates an understanding of both of the sessions' content by fully " participating, sharing and problem solving.    Is this a Weekly Review of the Progress on the Treatment Plan?  Yes.      Are Treatment Plan Goals being addressed?  Yes, continue treatment goals      Are Treatment Plan Strategies to Address Goals Effective?  Yes, continue treatment strategies      Are there any current contracts in place?  No

## 2017-12-26 ENCOUNTER — HOSPITAL ENCOUNTER (OUTPATIENT)
Dept: BEHAVIORAL HEALTH | Facility: CLINIC | Age: 72
End: 2017-12-26
Attending: NURSE PRACTITIONER
Payer: MEDICARE

## 2017-12-26 PROCEDURE — H2012 BEHAV HLTH DAY TREAT, PER HR: HCPCS

## 2017-12-26 NOTE — PROGRESS NOTES
"  Adult Mental Health Outpatient Group Therapy Progress Note     Client Initial Individualized Goals for Treatment:Katerina verbalizes the following treatment/discharge goals: \"To decrease the fear of each day. Increase my self confidence again. To be able to face the day. Decrease anxiety symptoms\".      See Initial Treatment suggestions for the client during the time between Diagnostic Assessment and completion of the Master Individualized Treatment Plan.    Treatment Goals:  . Will plan and problem-solve to return to previous socia, enjoyable activities and decrease lonliness and isolation  Will plan and problem-solve to decrease fears and anxiety and increase self confidence  Client will notify staff when needing assistance to develop or implement a coping plan to manage suicidal or self injurious urges.       Area of Treatment Focus:  Symptom Management and Develop Socialization / Interpersonal Relationship Skills    Therapeutic Interventions/Treatment Strategies:  Support, Feedback, Structured Activity, Clarification and Education    Response to Treatment Strategies:  Accepted Feedback, Gave Feedback, Listened, Focused on Goals, Attentive, Accepted Support and Alert    Name of Group:  Mental health management     Description and Outcome:  The group was provided with a guided breathing and warmup structure with focus on increasing self awareness and on providing an embodied experience.  Discussion included the importance of listening to body cues as a way of identifying emotion as well as accompanying needs and wants, and as a way of practising self compassion, authenticity, mindfulness, self expression,  and connection to other.  The group described feeling exhausted from holiday weekend while also experiencing \"post holiday relief\".  The group used movement/dance to express/embody the their experience of past few days.  The \"dance\" concluded with an acknowledgment of being \"presents worth keeping.).  Katerina was " engaged.  She reported feeling less anxious at end of group and was able to identify that her lessening of anxiety coincided with interaction with peers.    Is this a Weekly Review of the Progress on the Treatment Plan?  No

## 2017-12-26 NOTE — PROGRESS NOTES
"  Adult Mental Health Outpatient Group Therapy Progress Note       See Initial Treatment suggestions for the client during the time between Diagnostic Assessment and completion of the Master Individualized Treatment Plan.    Treatment Goals:     Will plan and problem-solve to return to previous socia, enjoyable activities and decrease lonliness and isolation  Will plan and problem-solve to decrease fears and anxiety and increase self confidence                       Status: Active  Client will notify staff when needing assistance to develop or implement a coping plan to manage suicidal or self injurious urges.       Area of Treatment Focus:  Symptom Management, Community Resources/Discharge Planning and Develop Socialization / Interpersonal Relationship Skills    Therapeutic Interventions/Treatment Strategies:  Support, Feedback, Structured Activity, Problem Solving, Clarification and Education    Response to Treatment Strategies:  Accepted Feedback, Gave Feedback, Listened, Focused on Goals, Attentive, Accepted Support and Alert    Name of Group:  Psychotherapy      Description and Outcome:  In session 1, client reports unrelenting anxiety and states \"everything is hard\". She woke up this morning ruminating about the possibility that no one would be at the program, due to the weather and that she would need to go home. She was panicky about what she would do with her time there. Katerina reports that her [place] gatherings went well overall, despite much obsessing about this beforehand. When asked if she can use this experience to avoid catastrophic thinking in the future, she was unsure. Katerina talked about not knowing what to do with her time at home and feeling guilty when she rests on the couch. We talked about making a list or schedule of ideas, but she was unsure if that would be effective. She has plans to see the dr and get together with her daughter this week.  In session 2, the importance of distraction, " when recovering from depression/anxiety, was discussed.   Client demonstrated an understanding of the session by participating, sharing and giving feedback.    Is this a Weekly Review of the Progress on the Treatment Plan?  No

## 2017-12-26 NOTE — PROGRESS NOTES
Psychiatry staffing: case discussed  Diagnosis: Depression, Anxiety, possibly cognitive decline. Saw three different psychiatrist.     Current Outpatient Prescriptions   Medication     OLANZapine (ZYPREXA PO)     GABAPENTIN PO     OLANZapine (ZYPREXA PO)     Divalproex Sodium (DEPAKOTE PO)     MELATONIN PO     clonazePAM (KLONOPIN) 0.5 MG tablet     multivitamin, therapeutic (THERA-VIT) TABS     calcium carbonate (OS- MG [place]) 500 MG tablet     No current facility-administered medications for this encounter.      Past Medical History:   Diagnosis Date     Arthritis     oesteoporosis and OA-hands     Cancer (H) 2015    skin     Depressive disorder     Bipolar

## 2018-01-02 ENCOUNTER — HOSPITAL ENCOUNTER (OUTPATIENT)
Dept: BEHAVIORAL HEALTH | Facility: CLINIC | Age: 73
End: 2018-01-02
Attending: NURSE PRACTITIONER
Payer: MEDICARE

## 2018-01-02 PROCEDURE — H2012 BEHAV HLTH DAY TREAT, PER HR: HCPCS

## 2018-01-02 NOTE — PROGRESS NOTES
"  Adult Mental Health Outpatient Group Therapy Progress Note     Client Initial Individualized Goals for Treatment:Katerina verbalizes the following treatment/discharge goals: \"To decrease the fear of each day. Increase my self confidence again. To be able to face the day. Decrease anxiety symptoms\".      See Initial Treatment suggestions for the client during the time between Diagnostic Assessment and completion of the Master Individualized Treatment Plan.    Treatment Goals:     see above     Area of Treatment Focus:  Symptom Management and Develop Socialization / Interpersonal Relationship Skills    Therapeutic Interventions/Treatment Strategies:  Support, Feedback, Structured Activity, Clarification and Education    Response to Treatment Strategies:  Accepted Feedback, Gave Feedback, Listened, Focused on Goals, Attentive, Accepted Support and Alert    Name of Group:   Hour 1: Katerina shared with the group that she had a good week end. She reported that she was able to stay at the party for four hours on New Years [place] with no problem, but she did very little interacting by her report. She went on to say that she was not doing very well today, but could not say why. It was again pointed out that her focus consistently tends to be on the negative and she agrees to try and look on the positive side of things.   Session 2: The group discussed the topic of how to objectively measure the progress they are making towards their goals in the program.  Description and Outcome:   Client demonstrated an understanding of the group by actively participating in the discussion and sharing information pertinent to the topic at hand, as well as offering support to peers.      Is this a Weekly Review of the Progress on the Treatment Plan?  No  "

## 2018-01-02 NOTE — PROGRESS NOTES
"  Adult Mental Health Outpatient Group Therapy Progress Note     Client Initial Individualized Goals for Treatment:Katerina verbalizes the following treatment/discharge goals: \"To decrease the fear of each day. Increase my self confidence again. To be able to face the day. Decrease anxiety symptoms\".      See Initial Treatment suggestions for the client during the time between Diagnostic Assessment and completion of the Master Individualized Treatment Plan.    Treatment Goals:  . Will plan and problem-solve to return to previous socia, enjoyable activities and decrease lonliness and isolation  Will plan and problem-solve to decrease fears and anxiety and increase self confidence  Client will notify staff when needing assistance to develop or implement a coping plan to manage suicidal or self injurious urges.     Area of Treatment Focus:  Symptom Management and Develop Socialization / Interpersonal Relationship Skills    Therapeutic Interventions/Treatment Strategies:  Support, Feedback, Structured Activity and Clarification    Response to Treatment Strategies:  Accepted Feedback, Gave Feedback, Listened, Focused on Goals, Attentive, Accepted Support and Alert    Name of Group:  Mental health management     Description and Outcome:  The group was provided with a guided breathing and warmup structure with focus on increasing self awareness and on providing an embodied experience.  Discussion included the importance of listening to body cues as a way of identifying emotion as well as accompanying needs and wants, and as a way of practising self compassion, authenticity, mindfulness, self expression,  and connection to other.  The group developed a \"movement story\" , centering on squirrels nesting for the winter and collecting nuts.  Themes emerged around having what you need, sharing when you may have extra, planning ahead and safety. Katerina was engaged and involved.  She reported a decrease in anxiety at the end of " group.    Is this a Weekly Review of the Progress on the Treatment Plan?  No

## 2018-01-05 ENCOUNTER — HOSPITAL ENCOUNTER (OUTPATIENT)
Dept: BEHAVIORAL HEALTH | Facility: CLINIC | Age: 73
End: 2018-01-05
Attending: NURSE PRACTITIONER
Payer: MEDICARE

## 2018-01-05 PROCEDURE — H2012 BEHAV HLTH DAY TREAT, PER HR: HCPCS

## 2018-01-05 NOTE — PROGRESS NOTES
Adult Mental Health Outpatient Group Therapy Progress Note       See Initial Treatment suggestions for the client during the time between Diagnostic Assessment and completion of the Master Individualized Treatment Plan.    Treatment Goals:     Will plan and problem-solve to return to previous socia, enjoyable activities and decrease lonliness and isolation  Will plan and problem-solve to decrease fears and anxiety and increase self confidence                       Status: Active  Client will notify staff when needing assistance to develop or implement a coping plan to manage suicidal or self injurious urges.       Area of Treatment Focus:  Symptom Management    Therapeutic Interventions/Treatment Strategies:  Support, Feedback, Structured Activity, Problem Solving, Clarification and Education    Response to Treatment Strategies:  Accepted Feedback, Gave minimal Feedback, Listened, Focused on Goals, Attentive, Accepted Support and Alert    Name of Group:  Mental Health Management     Description and Outcome:  Client participated in a women's issues group looking at messages received from family on aging and  female family members who have struggled with mental illness. The group also discussed changing purpose and identity. Katerina talked about her mother's lack of empathy towards her and her teen years mental illness. She also was focused on her changed identity from wife, mother, grandmother, friend and volunteer to her current inability to be active again. She is uncertain how to regain her functioning level. Client demonstrated an understanding of group content by  participating, offering some feedback.    Is this a Weekly Review of the Progress on the Treatment Plan?  No

## 2018-01-05 NOTE — PROGRESS NOTES
"  Adult Mental Health Outpatient Group Therapy Progress Note       See Initial Treatment suggestions for the client during the time between Diagnostic Assessment and completion of the Master Individualized Treatment Plan.    Treatment Goals:     Will plan and problem-solve to return to previous socia, enjoyable activities and decrease lonliness and isolation  Will plan and problem-solve to decrease fears and anxiety and increase self confidence                       Status: Active  Client will notify staff when needing assistance to develop or implement a coping plan to manage suicidal or self injurious urges.       Area of Treatment Focus:  Symptom Management    Therapeutic Interventions/Treatment Strategies:  Support, Feedback, Problem Solving, Clarification and Education    Response to Treatment Strategies:  Accepted Feedback, Gave Feedback, Listened, Focused on Goals, Attentive, Accepted Support and Alert    Name of Group:  Coping with depression      Description and Outcome:  Client learned about \"feelings and needs\" along with the importance of connecting with one's own needs, asking for support, when needed.    Is this a Weekly Review of the Progress on the Treatment Plan?  No    "

## 2018-01-05 NOTE — PROGRESS NOTES
"  Adult Mental Health Outpatient Group Therapy Progress Note       See Initial Treatment suggestions for the client during the time between Diagnostic Assessment and completion of the Master Individualized Treatment Plan.    Treatment Goals:     Will plan and problem-solve to return to previous socia, enjoyable activities and decrease lonliness and isolation  Will plan and problem-solve to decrease fears and anxiety and increase self confidence  Client will notify staff when needing assistance to develop or implement a coping plan to manage suicidal or self injurious urges.       Area of Treatment Focus:  Symptom Management, Community Resources/Discharge Planning and Develop Socialization / Interpersonal Relationship Skills    Therapeutic Interventions/Treatment Strategies:  Support, Feedback, Structured Activity, Problem Solving, Clarification and Education    Response to Treatment Strategies:  Accepted Feedback, Gave Feedback, Listened, Focused on Goals, Attentive, Accepted Support and Alert    Name of Group:  Psychotherapy      Description and Outcome:  In session one, Katerina shared that she feels that she continues to try hard to focus on the positive, yet she has a difficult time naming something positive she has accomplished. The group is able to provide her with multiple examples, and she will reluctantly agree, but minimize them. She indicated that she will follow the advice of her therapist and go on as if this anxiety is her \"new normal\" and try not to control it but just go with it.  Hour 2: The group focused on the idea of control and what in their life that they have control over. The result is that most felt that they had little or no control over the things that worry them the most. We explored ways to try and let this control go and focus on just what they could do something about.     Client demonstrated an understanding of the group by actively participating in the discussion and sharing information " pertinent to the topic at hand, as well as offering support to peers.       Is this a Weekly Review of the Progress on the Treatment Plan?  Yes.      Are Treatment Plan Goals being addressed?  Yes, continue treatment goals      Are Treatment Plan Strategies to Address Goals Effective?  Yes, continue treatment strategies      Are there any current contracts in place?  No

## 2018-01-09 ENCOUNTER — HOSPITAL ENCOUNTER (OUTPATIENT)
Dept: BEHAVIORAL HEALTH | Facility: CLINIC | Age: 73
End: 2018-01-09
Attending: NURSE PRACTITIONER
Payer: MEDICARE

## 2018-01-09 PROCEDURE — H2012 BEHAV HLTH DAY TREAT, PER HR: HCPCS

## 2018-01-09 NOTE — PROGRESS NOTES
"  Adult Mental Health Outpatient Group Therapy Progress Note     Client Initial Individualized Goals for Treatment:Katerina verbalizes the following treatment/discharge goals: \"To decrease the fear of each day. Increase my self confidence again. To be able to face the day. Decrease anxiety symptoms\".      See Initial Treatment suggestions for the client during the time between Diagnostic Assessment and completion of the Master Individualized Treatment Plan.    Treatment Goals:     see above     Area of Treatment Focus:  Symptom Management and Develop Socialization / Interpersonal Relationship Skills    Therapeutic Interventions/Treatment Strategies:  Support, Feedback, Structured Activity, Clarification and Education    Response to Treatment Strategies:  Accepted Feedback, Gave Feedback, Listened, Focused on Goals, Attentive, Accepted Support and Alert    Name of Group: Group Psychotherapy  Session 1: Katerina shared with the group that she was upset because her daughter told her that she thought she should be doing better by now. Katerina wanted to know what coping skills and tools she could use. The group was able to point out examples of multiple coping skills and tools she had used successfully in the very recent past. She wrote them all down and was going to hang on to them to use in the future. However, she stated that she didn't know what to use at what time.   Session 2: The group discussed the common theme of remaining positive and focusing on the things they have accomplished, as opposed to what they feel they need to do. They practice the exercise of reframing \"what I should do\", with \"what I could do\". This leaves more room for choices, and a better chance for positive outcomes.      Description and Outcome:  Client demonstrated an understanding of group content by fully participating, offering support and feedback.     Is this a Weekly Review of the Progress on the Treatment Plan?  No  "

## 2018-01-12 ENCOUNTER — HOSPITAL ENCOUNTER (OUTPATIENT)
Dept: BEHAVIORAL HEALTH | Facility: CLINIC | Age: 73
End: 2018-01-12
Attending: NURSE PRACTITIONER
Payer: MEDICARE

## 2018-01-12 PROCEDURE — H2012 BEHAV HLTH DAY TREAT, PER HR: HCPCS

## 2018-01-12 NOTE — PROGRESS NOTES
"Adult Mental Health Outpatient Group Therapy Progress Note     Client Initial Individualized Goals for Treatment: \"To decrease the fear of each day. Increase my self confidence again. To be able to face the day. Decrease anxiety symptoms\".    Treatment Goals:   Will plan and problem-solve to return to previous socia, enjoyable activities and decrease lonliness and isolation  Will plan and problem-solve to decrease fears and anxiety and increase self confidence  Client will notify staff when needing assistance to develop or implement a coping plan to manage suicidal or self injurious urges.       Area of Treatment Focus:  Symptom Management and Develop / Improve Independent Living Skills    Therapeutic Interventions/Treatment Strategies:  Support, Redirection, Feedback, Limit/Boundaries, Structured Activity, Problem Solving, Clarification, Education, Motivational Enhancement Therapy and required repetition and slow speaking so she could record each word.    Response to Treatment Strategies:  Accepted Feedback, Gave Feedback, Listened, Focused on Goals, Attentive, Accepted Support, Alert, Demonstrates Behavior Change and seemingly perfectionistic worry.    Name of Group:   Mental Health Management     Description and Outcome:  Mind-mapping: used the wellness wheel as an example of how to mind map after reading about this technique. This client colored and used symbols to make it memorable and fit for her.    Worry Management: Client identified strategies that she felt would be helpful and effective for her to manage her worry and highlighted them. As a group we read in turn from the provided handout. They learned about [facility] and about the book, \"Embracing the Fear\" by Zenaida Charles.    Client demonstrated understanding of session content by choosing, highlighting and discussing strategies and interacting with the mind-map.    Is this a Weekly Review of the Progress on the Treatment Plan?  No         "

## 2018-01-12 NOTE — PROGRESS NOTES
"Adult Mental Health Outpatient Group Therapy Progress Note     Client Initial Individualized Goals for Treatment: \"To decrease the fear of each day. Increase my self confidence again. To be able to face the day. Decrease anxiety symptoms\".    Treatment Goals:   Will plan and problem-solve to return to previous socia, enjoyable activities and decrease lonliness and isolation  Will plan and problem-solve to decrease fears and anxiety and increase self confidence  Client will notify staff when needing assistance to develop or implement a coping plan to manage suicidal or self injurious urges.       Area of Treatment Focus:  Symptom Management and Develop / Improve Independent Living Skills    Therapeutic Interventions/Treatment Strategies:  Support, Redirection, Feedback, Limit/Boundaries, Structured Activity, Problem Solving, Clarification, Education, Motivational Enhancement Therapy and required repetition and slow speaking so she could record each word.    Response to Treatment Strategies:  Accepted Feedback, Gave Feedback, Listened, Focused on Goals, Attentive, Accepted Support, Alert, Demonstrates Behavior Change and seemingly perfectionistic worry.    Name of Group:   Mental Health Management /Wellness    Description and Outcome:  Neuroplasticity and Yoga: Practiced mindful movement and breathing. Introduced \" Power Poses\",for engaging in feeling more able to act in client's life. Discussed the power of positive focus. Ended with more mindful movement.  Client demonstrated understanding of session content by: engaging in group, shared she felt more relaxed and positive, expressed interest in developing her own home practice.   Is this a Weekly Review of the Progress on the Treatment Plan?  No         "

## 2018-01-16 ENCOUNTER — HOSPITAL ENCOUNTER (OUTPATIENT)
Dept: BEHAVIORAL HEALTH | Facility: CLINIC | Age: 73
End: 2018-01-16
Attending: NURSE PRACTITIONER
Payer: MEDICARE

## 2018-01-16 PROCEDURE — H2012 BEHAV HLTH DAY TREAT, PER HR: HCPCS

## 2018-01-16 NOTE — PROGRESS NOTES
"  Adult Mental Health Outpatient Group Therapy Progress Note     Client Initial Individualized Goals for Treatment:Katerina verbalizes the following treatment/discharge goals: \"To decrease the fear of each day. Increase my self confidence again. To be able to face the day. Decrease anxiety symptoms\".      See Initial Treatment suggestions for the client during the time between Diagnostic Assessment and completion of the Master Individualized Treatment Plan.    Treatment Goals:     see above     Area of Treatment Focus:  Symptom Management and Develop Socialization / Interpersonal Relationship Skills    Therapeutic Interventions/Treatment Strategies:  Support, Feedback, Structured Activity, Clarification and Education    Response to Treatment Strategies:  Accepted Feedback, Gave Feedback, Listened, Focused on Goals, Attentive, Accepted Support and Alert    Name of Group: Group Psychotherapy  Session 1: Katerina reported to the group that she feels like she is doing well, but quickly refutes this, citing increasing anxiety while she is speaking. She also reports rising anxiety over attending her book club tomorrow, a function she enjoys. She stated that she wants to \"take my life back\", but cannot describe what that might look like.  Session 2: The group discussed what options they might pursue to deal with their illness, other than simply medication. A common option was exercise, as well as getting enough sleep and eating well. They identified the necessity of getting out of the house and pursuing something of interest or meaning to them.       Description and Outcome:  Client demonstrated an understanding of group content by fully participating, offering support and feedback.     Is this a Weekly Review of the Progress on the Treatment Plan?  No  "

## 2018-01-17 NOTE — PROGRESS NOTES
"  Adult Mental Health Outpatient Group Therapy Progress Note     Client Initial Individualized Goals for Treatment:Katerina verbalizes the following treatment/discharge goals: \"To decrease the fear of each day. Increase my self confidence again. To be able to face the day. Decrease anxiety symptoms\".      See Initial Treatment suggestions for the client during the time between Diagnostic Assessment and completion of the Master Individualized Treatment Plan.    Treatment Goals:  . Will plan and problem-solve to return to previous socia, enjoyable activities and decrease lonliness and isolation  Will plan and problem-solve to decrease fears and anxiety and increase self confidence  Client will notify staff when needing assistance to develop or implement a coping plan to manage suicidal or self injurious urges.     Area of Treatment Focus:  Symptom Management and Develop Socialization / Interpersonal Relationship Skills    Therapeutic Interventions/Treatment Strategies:  Support, Feedback, Structured Activity, Clarification and Education    Response to Treatment Strategies:  Accepted Feedback, Gave Feedback, Listened, Focused on Goals, Attentive, Accepted Support and Alert    Name of Group:  Mental health management     Description and Outcome:  The group was provided with a guided breathing and warmup structure with focus on increasing self awareness and on providing an embodied experience.  Discussion included the importance of listening to body cues as a way of identifying emotion as well as accompanying needs and wants, and as a way of practising self compassion, authenticity, mindfulness, self expression,  and connection to other.  The group used image of being at a beach, surfing, building sand castles, etc. To explore themes around risk taking, \"going with the flow,\" and inhibition.  Katerina was engaged.  She demonstrated understanding of session by taking risks with assertion.    Is this a Weekly Review of the " Progress on the Treatment Plan?  No

## 2018-01-23 ENCOUNTER — HOSPITAL ENCOUNTER (OUTPATIENT)
Dept: BEHAVIORAL HEALTH | Facility: CLINIC | Age: 73
End: 2018-01-23
Attending: NURSE PRACTITIONER
Payer: MEDICARE

## 2018-01-23 PROCEDURE — H2012 BEHAV HLTH DAY TREAT, PER HR: HCPCS

## 2018-01-23 NOTE — ADDENDUM NOTE
Encounter addended by: Krystian Baltazar, [facility] on: 1/23/2018  4:48 PM<BR>     Actions taken: Flowsheet accepted, Sign clinical note

## 2018-01-24 NOTE — PROGRESS NOTES
"  Adult Mental Health Outpatient Group Therapy Progress Note     Client Initial Individualized Goals for Treatment:Katerina verbalizes the following treatment/discharge goals: \"To decrease the fear of each day. Increase my self confidence again. To be able to face the day. Decrease anxiety symptoms\".      See Initial Treatment suggestions for the client during the time between Diagnostic Assessment and completion of the Master Individualized Treatment Plan.    Treatment Goals:  . Will plan and problem-solve to return to previous socia, enjoyable activities and decrease lonliness and isolation  Will plan and problem-solve to decrease fears and anxiety and increase self confidence  Client will notify staff when needing assistance to develop or implement a coping plan to manage suicidal or self injurious urges.     Area of Treatment Focus:  Symptom Management and Develop Socialization / Interpersonal Relationship Skills    Therapeutic Interventions/Treatment Strategies:  Support, Feedback, Structured Activity and Clarification    Response to Treatment Strategies:  Accepted Feedback, Gave Feedback, Listened, Focused on Goals, Attentive, Accepted Support and Alert    Name of Group:  Mental health management     Description and Outcome:  The group was provided with a guided breathing and warmup structure with focus on increasing self awareness and on providing an embodied experience.  Discussion included the importance of listening to body cues as a way of identifying emotion as well as accompanying needs and wants, and as a way of practising self compassion, authenticity, mindfulness, self expression,  and connection to other.  The group explored self soothing/gentleness through movement as well as using strength to \"attack\" obstacles.  Katerina shared that she was aware, through the movement experiential that she needed to practise self compassion.  Participated in discussion about using the energy of anger to take outward " action.    Is this a Weekly Review of the Progress on the Treatment Plan?  No

## 2018-01-25 NOTE — PROGRESS NOTES
"  Adult Mental Health Outpatient Group Therapy Progress Note       See Initial Treatment suggestions for the client during the time between Diagnostic Assessment and completion of the Master Individualized Treatment Plan.    Treatment Goals:     Will plan and problem-solve to return to previous socia, enjoyable activities and decrease lonliness and isolation  Will plan and problem-solve to decrease fears and anxiety and increase self confidence                       Status: Active  Client will notify staff when needing assistance to develop or implement a coping plan to manage suicidal or self injurious urges.       Area of Treatment Focus:  Symptom Management    Therapeutic Interventions/Treatment Strategies:  Support, Feedback, Structured Activity, Problem Solving, Clarification and Education    Response to Treatment Strategies:  Accepted Feedback, Listened, Attentive, Accepted Support and Alert    Name of Group:  Coping with Depression     Description and Outcome:  Client learned about various ways to increase hope. Group members were then asked to identify one action step that they can take this week to increase hope. Katerina struggled to do this. She chose \"set concrete goals and take action\" but then specified \"stop worrying so much\". Eventually she identified \"make meatloaf this week, because I haven't  done it for awhile and my  would be glad\". Client appears to struggle with constant worry and rumination.  Client needs continued support and assistance to understand today's group content. This topic will be continued on 1/26/18.     Is this a Weekly Review of the Progress on the Treatment Plan?  No        "

## 2018-01-26 ENCOUNTER — HOSPITAL ENCOUNTER (OUTPATIENT)
Dept: BEHAVIORAL HEALTH | Facility: CLINIC | Age: 73
End: 2018-01-26
Attending: NURSE PRACTITIONER
Payer: MEDICARE

## 2018-01-26 PROCEDURE — H2012 BEHAV HLTH DAY TREAT, PER HR: HCPCS

## 2018-01-26 NOTE — PROGRESS NOTES
"  Adult Mental Health Outpatient Group Therapy Progress Note       See Initial Treatment suggestions for the client during the time between Diagnostic Assessment and completion of the Master Individualized Treatment Plan.    Treatment Goals:     Will plan and problem-solve to return to previous socia, enjoyable activities and decrease lonliness and isolation  Will plan and problem-solve to decrease fears and anxiety and increase self confidence                       Status: Active  Client will notify staff when needing assistance to develop or implement a coping plan to manage suicidal or self injurious urges.       Area of Treatment Focus:  Symptom Management    Therapeutic Interventions/Treatment Strategies:  Support, Feedback, Structured Activity, Problem Solving, Clarification and Education    Response to Treatment Strategies:  Accepted Feedback, Gave Feedback, Listened, Focused on Goals, Attentive, Accepted Support and Alert    Name of Group:  Mental Health Management     Description and Outcome:  Client participated in part 2 of \"Ways to Increase Hope\" learning a variety of practices to decrease depression/anxiety. Client made a plan to get together with friends as a way to have fun this weekend.   Client demonstrates an understanding of group content by fully participating, sharing and giving feedback. Client states that this session helped to increase her hope.    Is this a Weekly Review of the Progress on the Treatment Plan?  No    "

## 2018-01-30 ENCOUNTER — HOSPITAL ENCOUNTER (OUTPATIENT)
Dept: BEHAVIORAL HEALTH | Facility: CLINIC | Age: 73
End: 2018-01-30
Attending: NURSE PRACTITIONER
Payer: MEDICARE

## 2018-01-30 PROCEDURE — H2012 BEHAV HLTH DAY TREAT, PER HR: HCPCS

## 2018-01-30 NOTE — PROGRESS NOTES
Psychiatry staffing: case discussed  Diagnosis: Depression and anxiety. Will have neuropsych testing- not scheduled, yet.     Current Outpatient Prescriptions   Medication     OLANZapine (ZYPREXA PO)     GABAPENTIN PO     OLANZapine (ZYPREXA PO)     Divalproex Sodium (DEPAKOTE PO)     MELATONIN PO     clonazePAM (KLONOPIN) 0.5 MG tablet     multivitamin, therapeutic (THERA-VIT) TABS     calcium carbonate (OS- MG [place]. [place]) 500 MG tablet     No current facility-administered medications for this encounter.      Past Medical History:   Diagnosis Date     Arthritis     oesteoporosis and OA-hands     Cancer (H) 2015    skin     Depressive disorder     Bipolar

## 2018-01-30 NOTE — PROGRESS NOTES
"  Adult Mental Health Outpatient Group Therapy Progress Note     Client Initial Individualized Goals for Treatment:Katerina verbalizes the following treatment/discharge goals: \"To decrease the fear of each day. Increase my self confidence again. To be able to face the day. Decrease anxiety symptoms\".      See Initial Treatment suggestions for the client during the time between Diagnostic Assessment and completion of the Master Individualized Treatment Plan.    Treatment Goals:     see above     Area of Treatment Focus:  Symptom Management and Develop Socialization / Interpersonal Relationship Skills    Therapeutic Interventions/Treatment Strategies:  Support, Feedback, Structured Activity, Clarification and Education    Response to Treatment Strategies:  Accepted Feedback, Gave Feedback, Listened, Focused on Goals, Attentive, Accepted Support and Alert    Name of Group: Group Psychotherapy  Session 1: Katerina reported to the group she is doing fine, \"for right now\". She seems to always feel the need to qualify her mood when she is doing well. She reported that she has not heard back from Dr. Chung's office for scheduling a neuropsych eval. Katerina reported doing a number of things over the week end with her  and having a fairly good time. She is now involved with some volunteer services and remains fairly active. However, she is focused on the time she is home and that is almost intolerable for her to be in her home, particularly alone.  Session 2: The group explored the ongoing theme of taking responsibility for what is \"mine\" versus what is someone else's agenda for me. Also, how to be able to focus on what it is that I want to accomplish for myself.        Description and Outcome:   Client demonstrated an understanding of the group by actively participating in the discussion and sharing information pertinent to the topic at hand, as well as offering support to peers.         Is this a Weekly Review of the " Progress on the Treatment Plan?  No

## 2018-01-30 NOTE — PROGRESS NOTES
"  Adult Mental Health Outpatient Group Therapy Progress Note     Client Initial Individualized Goals for Treatment:Katerina verbalizes the following treatment/discharge goals: \"To decrease the fear of each day. Increase my self confidence again. To be able to face the day. Decrease anxiety symptoms\".      See Initial Treatment suggestions for the client during the time between Diagnostic Assessment and completion of the Master Individualized Treatment Plan.    Treatment Goals:  . Will plan and problem-solve to return to previous socia, enjoyable activities and decrease lonliness and isolation  Will plan and problem-solve to decrease fears and anxiety and increase self confidence  Client will notify staff when needing assistance to develop or implement a coping plan to manage suicidal or self injurious urges.     Area of Treatment Focus:  Symptom Management and Develop Socialization / Interpersonal Relationship Skills    Therapeutic Interventions/Treatment Strategies:  Support, Feedback, Structured Activity, Clarification and Education    Response to Treatment Strategies:  Accepted Feedback, Gave Feedback, Listened, Focused on Goals, Attentive, Accepted Support and Alert    Name of Group:  Mental health management     Description and Outcome:  The group was provided with a guided breathing and warmup structure with focus on increasing self awareness and on providing an embodied experience.  Discussion included the importance of listening to body cues as a way of identifying emotion as well as accompanying needs and wants, and as a way of practising self compassion, authenticity, mindfulness, self expression,  and connection to other.  The group focused today on attending to oneself and self soothing.  Katerina demonstrated understanding of session by participating in experiential.  She reported finding that she found it difficult to refocus from stomach discomfort, which she related to anxiety.  Accepted feedback regarding " observation that when she is active she is better able to redirect.    Is this a Weekly Review of the Progress on the Treatment Plan?  No

## 2018-02-06 ENCOUNTER — HOSPITAL ENCOUNTER (OUTPATIENT)
Dept: BEHAVIORAL HEALTH | Facility: CLINIC | Age: 73
End: 2018-02-06
Attending: NURSE PRACTITIONER
Payer: MEDICARE

## 2018-02-06 PROCEDURE — H2012 BEHAV HLTH DAY TREAT, PER HR: HCPCS

## 2018-02-06 NOTE — PROGRESS NOTES
"  Adult Mental Health Outpatient Group Therapy Progress Note     Client Initial Individualized Goals for Treatment:Katerina verbalizes the following treatment/discharge goals: \"To decrease the fear of each day. Increase my self confidence again. To be able to face the day. Decrease anxiety symptoms\".      See Initial Treatment suggestions for the client during the time between Diagnostic Assessment and completion of the Master Individualized Treatment Plan.    Treatment Goals:  . Will plan and problem-solve to return to previous socia, enjoyable activities and decrease lonliness and isolation  Will plan and problem-solve to decrease fears and anxiety and increase self confidence  Client will notify staff when needing assistance to develop or implement a coping plan to manage suicidal or self injurious urges.     Area of Treatment Focus:  Symptom Management and Develop Socialization / Interpersonal Relationship Skills    Therapeutic Interventions/Treatment Strategies:  Support, Feedback, Structured Activity, Clarification and Education    Response to Treatment Strategies:  Accepted Feedback, Gave Feedback, Listened, Focused on Goals, Attentive, Accepted Support and Alert    Name of Group:  Mental health management     Description and Outcome:  The group was provided with a guided breathing and warmup structure with focus on increasing self awareness and on providing an embodied experience.  Discussion included the importance of listening to body cues as a way of identifying emotion as well as accompanying needs and wants, and as a way of practising self compassion, authenticity, mindfulness, self expression,  and connection to other.   The group focused on connection with others, including eye contact.  The group discussed their observation that while focusing on connection with others, they were less focused on their own issues.  They were encouraged how to apply that observation to everyday life.   Katerina demonstrated " understanding of session by sharing observations.  She was aware that she had difficulty shifting her focus from painful back.    Is this a Weekly Review of the Progress on the Treatment Plan?  No

## 2018-02-06 NOTE — PROGRESS NOTES
"  Adult Mental Health Outpatient Group Therapy Progress Note     Client Initial Individualized Goals for Treatment:Katerina verbalizes the following treatment/discharge goals: \"To decrease the fear of each day. Increase my self confidence again. To be able to face the day. Decrease anxiety symptoms\".      See Initial Treatment suggestions for the client during the time between Diagnostic Assessment and completion of the Master Individualized Treatment Plan.    Treatment Goals:     see above     Area of Treatment Focus:  Symptom Management and Develop Socialization / Interpersonal Relationship Skills    Therapeutic Interventions/Treatment Strategies:  Support, Feedback, Structured Activity, Clarification and Education    Response to Treatment Strategies:  Accepted Feedback, Gave Feedback, Listened, Focused on Goals, Attentive, Accepted Support and Alert    Name of Group: Group Psychotherapy  Session 1: Katerina reported to the group that she continues to have diarrhea as well as back pain. She attributes the diarrhea to her anxiety and the more she worries about it the worse it gets. She was strongly encouraged by the group to stop focusing on her anxiety. Her family went on a camping trip over the weekend and felt that she would be better off not going because of the GI problems she was having. She was encouraged to try to do more mediation to calm herself. She reported that she meditatates for 8 minutes, but can never focus long enough to meditate. She reported that she is unable to get an appointment for a neuropsych eval until she has a doctor's order.   Session 2: The group had an open discussion about the lack of progress being felt by the group members. People are frustrated that they don't feel they're making progress. After some discussion, it was evident that people are, in fact making progress. However it has been slow, and sometimes hindered by unrelated complications, such as physical illness. Members were " encouraged to focus on those things that have changed, progress they have made, and build upon that, rather than focus solely on what's not going well.     Description and Outcome:   Client demonstrated an understanding of the group by actively participating in the discussion and sharing information pertinent to the topic at hand, as well as offering support to peers.        Description and Outcome:   Client demonstrated an understanding of the group by actively participating in the discussion and sharing information pertinent to the topic at hand, as well as offering support to peers.         Is this a Weekly Review of the Progress on the Treatment Plan?  No

## 2018-02-09 ENCOUNTER — HOSPITAL ENCOUNTER (OUTPATIENT)
Dept: BEHAVIORAL HEALTH | Facility: CLINIC | Age: 73
End: 2018-02-09
Attending: NURSE PRACTITIONER
Payer: MEDICARE

## 2018-02-09 PROCEDURE — H2012 BEHAV HLTH DAY TREAT, PER HR: HCPCS

## 2018-02-09 NOTE — PROGRESS NOTES
"Group Therapy Progress Notes         Client Initial Individualized Goals for Treatment:Katerina verbalizes the following treatment/discharge goals: \"To decrease the fear of each day. Increase my self confidence again. To be able to face the day. Decrease anxiety symptoms\".        See Initial Treatment suggestions for the client during the time between Diagnostic Assessment and completion of the Master Individualized Treatment Plan.     Treatment Goals:  Will plan and problem-solve to return to previous social, enjoyable activities and decrease lonliness and isolation  Will plan and problem-solve to decrease fears and anxiety and increase self confidence  Client will notify staff when needing assistance to develop or implement a coping plan to manage suicidal     Area of Treatment Focus:  Symptom Management, Develop / Improve Independent Living Skills and Develop Socialization / Interpersonal Relationship Skills    Therapeutic Interventions/Treatment Strategies:  Support, Feedback, Structured Activity, Education and Cognitive Behavioral Therapy    Response to Treatment Strategies:  Accepted Feedback, Gave Feedback, Listened, Focused on Goals and Attentive    Name of Group:  Coping with Depression and Anxiety 1:00-1:50PM    Progress Note  Writer taught on model of emotions and challenging interpretations of events to assist in better understanding of emotions and jairo urges.   Katerina participated actively in willingly during teaching by taking notes and sharing examples when prompted. She shared that her back pain causes her anxiety and depression and expressed that teaching will assist her in avoiding isolation and decreasing anxiety related to pain. She expressed understanding that focusing on her interpretations can assist her in meeting her goals and improving her mood.Katerina would benefit from additional opportunities to practice and implement content from this session.        Latanya Beverly, [facility], [facility]  2/9/2018    Is " this a Weekly Review of the Progress on the Treatment Plan?  No

## 2018-02-09 NOTE — PROGRESS NOTES
Adult Mental Health Outpatient Group Therapy Progress Note       See Initial Treatment suggestions for the client during the time between Diagnostic Assessment and completion of the Master Individualized Treatment Plan.    Treatment Goals:     Will plan and problem-solve to return to previous socia, enjoyable activities and decrease lonliness and isolation  Will plan and problem-solve to decrease fears and anxiety and increase self confidence  Client will notify staff when needing assistance to develop or implement a coping plan to manage suicidal or self injurious urges.       Area of Treatment Focus:  Symptom Management, Community Resources/Discharge Planning and Develop Socialization / Interpersonal Relationship Skills    Therapeutic Interventions/Treatment Strategies:  Support, Feedback, Structured Activity, Problem Solving, Clarification and Education    Response to Treatment Strategies:  Accepted Feedback, Gave Feedback, Listened, Focused on Goals, Attentive, Accepted Support and Alert    Name of Group:  Psychotherapy, Wellness     Description and Outcome:  Hour 1: Katerina shared that she is having a terrible time with her back and that her physical therapy is making it worse. She also reported that she is having diarrhea from her anxiety. She reported that she is trying to fight her anxiety by only focusing on the positive, which is quite contrary to what she initially presents. She shared that she is having some difficulties in her relationship with her  because she no longer wants to go out or have people over. She feels that she is being blamed for all of their problems. She did report that her two sisters are coming to visit with her on Saturday and they were going to spend the day having lunch and playing dominoes. She stated that she hopes she isn't too anxious to participate.  Hour 2: The group discussed the recurring theme of change. In particular, how things change within a family as we grow  older. Children grow up, old traditions aren't maintained, and it feels like the families are drifting farther apart. This was a common experience for the group members and there were a lot of feelings of loss and sadness around this.     Wellness: The group discussed what they might do over the weekend that would help them remain aware of their goals for therapy, while also adding some fun and balance in their life. The weather has kept most people indoors there has been a lack of social interaction, which does not help with depression.     Description and Outcome:  Client verbalized understanding of sessions content by discussing with the group those feelings or situations they wished to change. Also by sharing what behavior changes have been effective in implementing these changes.       Is this a Weekly Review of the Progress on the Treatment Plan?  Yes.      Are Treatment Plan Goals being addressed?  Yes, continue treatment goals      Are Treatment Plan Strategies to Address Goals Effective?  Yes, continue treatment strategies      Are there any current contracts in place?  No

## 2018-02-20 ENCOUNTER — TELEPHONE (OUTPATIENT)
Dept: BEHAVIORAL HEALTH | Facility: CLINIC | Age: 73
End: 2018-02-20

## 2018-02-20 ENCOUNTER — HOSPITAL ENCOUNTER (OUTPATIENT)
Dept: BEHAVIORAL HEALTH | Facility: CLINIC | Age: 73
End: 2018-02-20
Attending: NURSE PRACTITIONER
Payer: MEDICARE

## 2018-02-20 PROCEDURE — H2012 BEHAV HLTH DAY TREAT, PER HR: HCPCS

## 2018-02-20 NOTE — TELEPHONE ENCOUNTER
----- Message from Magy Rahman sent at 2/20/2018 12:16 PM CST -----  Regarding: Please make more appts for pt starting today, 2/20/18  Pt needs more appts in the SOP track C1, Tuesdays and Fridays, under NP Celso Matamoros.

## 2018-02-20 NOTE — PROGRESS NOTES
At pt's request, writer contacted her psychiatrists office to obtain an order to have neuropsychological testing done by Dr. Chery Chung. They returned the call and indicated that they would be willing to do this. Esther had tried to schedule this earlier, but through some misunderstanding, thought she needed to go through her psychiatrist's clinic, which she did. However, the soonest they could get her in was in five months. Dr. Chung's office was contacted and Dr. Hernandez's nurse were contacted and with esther they were going to try and coordinate a much sooner date for the testing.

## 2018-02-20 NOTE — PROGRESS NOTES
"  Adult Mental Health Outpatient Group Therapy Progress Note     Client Initial Individualized Goals for Treatment:Katerina verbalizes the following treatment/discharge goals: \"To decrease the fear of each day. Increase my self confidence again. To be able to face the day. Decrease anxiety symptoms\".      See Initial Treatment suggestions for the client during the time between Diagnostic Assessment and completion of the Master Individualized Treatment Plan.    Treatment Goals:     see above     Area of Treatment Focus:  Symptom Management and Develop Socialization / Interpersonal Relationship Skills    Therapeutic Interventions/Treatment Strategies:  Support, Feedback, Structured Activity, Clarification and Education    Response to Treatment Strategies:  Accepted Feedback, Gave Feedback, Listened, Focused on Goals, Attentive, Accepted Support and Alert    Description and Outcome    Name of Group: Group Psychotherapy  Session 1: Katerina reported to the group that she continues to experience extreme anxiety. She indicated that she missed last week because her anxiety felt too out of control on Tuesday and Friday she accompanied her  to his medical appointment. She continues to focus almost solely on the things that she perceives as being wrong with her and then ruminates over this.   Session 2: The group explored different ways to try and shift their focus from the negative to the positive. They also discussed the value of maintaining a gratitude list to help put things in a positive perspective.        Client demonstrated an understanding of the group by actively participating in the discussion and sharing information pertinent to the topic at hand, as well as offering support to peers.            Is this a Weekly Review of the Progress on the Treatment Plan?  No  "

## 2018-02-20 NOTE — PROGRESS NOTES
"  Adult Mental Health Outpatient Group Therapy Progress Note     Client Initial Individualized Goals for Treatment:Katerina verbalizes the following treatment/discharge goals: \"To decrease the fear of each day. Increase my self confidence again. To be able to face the day. Decrease anxiety symptoms\".      See Initial Treatment suggestions for the client during the time between Diagnostic Assessment and completion of the Master Individualized Treatment Plan.    Treatment Goals:  . Will plan and problem-solve to return to previous socia, enjoyable activities and decrease lonliness and isolation  Will plan and problem-solve to decrease fears and anxiety and increase self confidence  Client will notify staff when needing assistance to develop or implement a coping plan to manage suicidal or self injurious urges.     Area of Treatment Focus:  Symptom Management and Develop Socialization / Interpersonal Relationship Skills    Therapeutic Interventions/Treatment Strategies:  Support, Feedback, Structured Activity, Clarification and Education    Response to Treatment Strategies:  Accepted Feedback, Gave Feedback, Listened, Focused on Goals, Attentive, Accepted Support and Alert    Name of Group:  Mental health management     Description and Outcome:  The group was provided with a guided breathing and warmup structure with focus on increasing self awareness and on providing an embodied experience.  Discussion included the importance of listening to body cues as a way of identifying emotion as well as accompanying needs and wants, and as a way of practising self compassion, authenticity, mindfulness, self expression,  and connection to other.  The group reviewed concept of mindfulness as well as it's purpose.  Group members were encouraged to use SIFT as a structure for practising mindfulness while moving.  The group also expressed a desire to \"energize\" during the hour. Katerina shared that she felt more relaxed at end of group.  " She was engaged and demonstrated understanding of session by asking relevant questions.    Is this a Weekly Review of the Progress on the Treatment Plan?  No

## 2018-02-23 ENCOUNTER — HOSPITAL ENCOUNTER (OUTPATIENT)
Dept: BEHAVIORAL HEALTH | Facility: CLINIC | Age: 73
End: 2018-02-23
Attending: NURSE PRACTITIONER
Payer: MEDICARE

## 2018-02-23 PROCEDURE — H2012 BEHAV HLTH DAY TREAT, PER HR: HCPCS | Mod: 52

## 2018-02-23 PROCEDURE — H2012 BEHAV HLTH DAY TREAT, PER HR: HCPCS

## 2018-02-23 NOTE — PROGRESS NOTES
"Adult Mental Health Outpatient Group Therapy Progress Note     Client Initial Individualized Goals for Treatment: \"To decrease the fear of each day. Increase my self confidence again. To be able to face the day. Decrease anxiety symptoms\".    Treatment Goals:   Will plan and problem-solve to return to previous socia, enjoyable activities and decrease lonliness and isolation  Will plan and problem-solve to decrease fears and anxiety and increase self confidence  Client will notify staff when needing assistance to develop or implement a coping plan to manage suicidal or self injurious urges.       Area of Treatment Focus:  Symptom Management, Personal Safety and Develop / Improve Independent Living Skills    Therapeutic Interventions/Treatment Strategies:  Support, Redirection, Feedback, Limit/Boundaries, Safety Assessments, Structured Activity, Clarification, Education, Motivational Enhancement Therapy and Cognitive Behavioral Therapy    Response to Treatment Strategies:  Accepted Feedback, Gave Feedback, Listened, Focused on Goals, Attentive, Accepted Support, Alert and Demonstrates Behavior Change    Name of Group:  Mental health management/Wellness    Description and Outcome:  Viewed [facility]IR[facility]IA[facility] talk by Mindi Casey on aging and the \" third act\". Facilitated a discussion on points made in talk including: doing a life review and working on closure on past relationships ( especially parents), developing new behavior that influence neuropathways, and how we can make a decision to have the freedom to chose how we will respond to any situation. Katerina shared her experiences of her mother and older sister ( they were very negative towards Katerina and unsupportive). She questioned whether she may not have mental illness had her childhood been different. We discussed that there is no answer but that of course her childhood impacted her. Again went back to \" chosing a response to difficulites\". Katerina verbalized understanding this and " feels she has done a lot of work on forgiving her mother and sister.            Is this a Weekly Review of the Progress on the Treatment Plan?  Yes.      Are Treatment Plan Goals being addressed?  Yes, continue treatment goals      Are Treatment Plan Strategies to Address Goals Effective?  Yes, continue treatment strategies      Are there any current contracts in place?  No

## 2018-02-23 NOTE — PROGRESS NOTES
"Adult Mental Health Outpatient Group Therapy Progress Note     Client Initial Individualized Goals for Treatment: \"To decrease the fear of each day. Increase my self confidence again. To be able to face the day. Decrease anxiety symptoms\".    Treatment Goals:   Will plan and problem-solve to return to previous socia, enjoyable activities and decrease lonliness and isolation  Will plan and problem-solve to decrease fears and anxiety and increase self confidence  Client will notify staff when needing assistance to develop or implement a coping plan to manage suicidal or self injurious urges.       Area of Treatment Focus:  Symptom Management, Personal Safety and Develop / Improve Independent Living Skills    Therapeutic Interventions/Treatment Strategies:  Support, Redirection, Feedback, Limit/Boundaries, Safety Assessments, Structured Activity, Clarification, Education, Motivational Enhancement Therapy and Cognitive Behavioral Therapy    Response to Treatment Strategies:  Accepted Feedback, Gave Feedback, Listened, Focused on Goals, Attentive, Accepted Support, Alert and Demonstrates Behavior Change    Name of Group:  Mental health management    Description and Outcome:  Katerina actively participated in the group discussion of coping with and managing anxiety. She reports that her life feels totally controlled by her anxiety and she does not know what to do. She liked the suggestion of using music to calm down. She also indicated that she walks regularly, which has been helpful. She reported that her biggest downfall is that she tends to focus only on the negative and that leads her down the path to rumination and increased anxiety.    The client showed and understanding of the group material by actively participating in the discussion and sharing examples from her own life.      Is this a Weekly Review of the Progress on the Treatment Plan?  Yes.      Are Treatment Plan Goals being addressed?  Yes, continue treatment " goals      Are Treatment Plan Strategies to Address Goals Effective?  Yes, continue treatment strategies      Are there any current contracts in place?  No

## 2018-02-23 NOTE — PROGRESS NOTES
"Adult Mental Health Outpatient Group Therapy Progress Note     Client Initial Individualized Goals for Treatment: \"To decrease the fear of each day. Increase my self confidence again. To be able to face the day. Decrease anxiety symptoms\".    Treatment Goals:   Will plan and problem-solve to return to previous socia, enjoyable activities and decrease lonliness and isolation  Will plan and problem-solve to decrease fears and anxiety and increase self confidence  Client will notify staff when needing assistance to develop or implement a coping plan to manage suicidal or self injurious urges.       Area of Treatment Focus:  Symptom Management, Personal Safety and Develop / Improve Independent Living Skills    Therapeutic Interventions/Treatment Strategies:  Support, Redirection, Feedback, Limit/Boundaries, Safety Assessments, Structured Activity, Clarification, Education, Motivational Enhancement Therapy and Cognitive Behavioral Therapy    Response to Treatment Strategies:  Accepted Feedback, Gave Feedback, Listened, Focused on Goals, Attentive, Accepted Support, Alert and Demonstrates Behavior Change    Name of Group:  Group Psychotherrapy     Description and Outcome:  Session One: Katerina asks to get whatever anyone else gets. So received a couple of pages to color. This might be a positive thing she can do to distract from her constant worry about doing the wrong thing or not knowing what the \"right \" thing is. She feels she has not made any progress in the last 10 months. She is suffering with back pain and has gone one time to a massage therapist who is aggravating it. She sees her tonight and she will ask if there is something else they could do that doesn't hurt so much. She wonders if this is there therapy for her or if water therapy might work better. She will try to work on focusing on comfort for herself this weekend.    Session Two: We talked about the dysfunction of rumination and how that paralyzes. We also " talked about how focusing on problems, pain or MI too much can be counter productive and that she might try affirmations (a handout was given), saying less critical things to herself, and more reassurance may be what she needs to hear- from herself and from her .    Client demonstrated understanding of session content by writing down how she will assert her needs..      Is this a Weekly Review of the Progress on the Treatment Plan?  Yes.      Are Treatment Plan Goals being addressed?  Yes, continue treatment goals      Are Treatment Plan Strategies to Address Goals Effective?  Yes, continue treatment strategies      Are there any current contracts in place?  No

## 2018-02-27 ENCOUNTER — HOSPITAL ENCOUNTER (OUTPATIENT)
Dept: BEHAVIORAL HEALTH | Facility: CLINIC | Age: 73
End: 2018-02-27
Attending: NURSE PRACTITIONER
Payer: MEDICARE

## 2018-02-27 PROCEDURE — H2012 BEHAV HLTH DAY TREAT, PER HR: HCPCS

## 2018-02-27 NOTE — PROGRESS NOTES
"  Adult Mental Health Outpatient Group Therapy Progress Note     Client Initial Individualized Goals for Treatment:Katerina verbalizes the following treatment/discharge goals: \"To decrease the fear of each day. Increase my self confidence again. To be able to face the day. Decrease anxiety symptoms\".      See Initial Treatment suggestions for the client during the time between Diagnostic Assessment and completion of the Master Individualized Treatment Plan.    Treatment Goals:     see above     Area of Treatment Focus:  Symptom Management and Develop Socialization / Interpersonal Relationship Skills    Therapeutic Interventions/Treatment Strategies:  Support, Feedback, Structured Activity, Clarification and Education    Response to Treatment Strategies:  Accepted Feedback, Gave Feedback, Listened, Focused on Goals, Attentive, Accepted Support and Alert    Description and Outcome    Name of Group: Group Psychotherapy  Session 1: Katerina shared with the group that she had a difficult weekend because of her \"excruciating back pain\". She reported that this contributes to her anxiety, as well. She was able to, however, go for a walk with her daughter on Saturday. She continues to go PT and has hard time believing them when they tell her it will get better. She also shared that her  would like her to find a new psychiatrist, as there have been no changes in over a year. She is ambivalent but will go to her next scheduled appointment which is shortly after her neuropsych testing which is scheduled for March 14.  Session 2: The group discussed the recurring issue today of having a psychiatrist that you trust and that will really listen to what you are saying. If that is not the case, they shared ideas on how one might go about finding a new psychiatrist.        Client demonstrated an understanding of the group by actively participating in the discussion and sharing information pertinent to the topic at hand, as well as " offering support to peers.            Is this a Weekly Review of the Progress on the Treatment Plan?  No

## 2018-02-28 NOTE — PROGRESS NOTES
"  Adult Mental Health Outpatient Group Therapy Progress Note     Client Initial Individualized Goals for Treatment:Katerina verbalizes the following treatment/discharge goals: \"To decrease the fear of each day. Increase my self confidence again. To be able to face the day. Decrease anxiety symptoms\".      See Initial Treatment suggestions for the client during the time between Diagnostic Assessment and completion of the Master Individualized Treatment Plan.    Treatment Goals:  . Will plan and problem-solve to return to previous socia, enjoyable activities and decrease lonliness and isolation  Will plan and problem-solve to decrease fears and anxiety and increase self confidence  Client will notify staff when needing assistance to develop or implement a coping plan to manage suicidal or self injurious urges.       Area of Treatment Focus:  Symptom Management and Develop Socialization / Interpersonal Relationship Skills    Therapeutic Interventions/Treatment Strategies:  Support, Feedback, Structured Activity, Clarification and Education    Response to Treatment Strategies:  Accepted Feedback, Gave Feedback, Listened, Focused on Goals, Attentive, Accepted Support and Alert    Name of Group:  Mental health management     Description and Outcome:  The group was provided with a guided breathing and warmup structure with focus on increasing self awareness and on providing an embodied experience.  Discussion included the importance of listening to body cues as a way of identifying emotion as well as accompanying needs and wants, and as a way of practising self compassion, authenticity, mindfulness, self expression,  and connection to other.  The group developed a theme around \"opening the door\" in movement  and looking for something positive.  The group identified fears around opening the door but each were able to identify something positive and also noticed each time they took the risk it became easier.  Katerina demonstrated " understanding of session by engaging in experiential.  She shared that she found ease from back pain and her grandchildren when she opened the door.      Is this a Weekly Review of the Progress on the Treatment Plan?  No

## 2018-03-06 ENCOUNTER — HOSPITAL ENCOUNTER (OUTPATIENT)
Dept: BEHAVIORAL HEALTH | Facility: CLINIC | Age: 73
End: 2018-03-06
Attending: NURSE PRACTITIONER
Payer: MEDICARE

## 2018-03-06 PROCEDURE — 99213 OFFICE O/P EST LOW 20 MIN: CPT | Performed by: NURSE PRACTITIONER

## 2018-03-06 PROCEDURE — H2012 BEHAV HLTH DAY TREAT, PER HR: HCPCS

## 2018-03-06 NOTE — PROGRESS NOTES
"Adult Mental Health   Mental Health Program    Progress Note     Group Time: 1:00pm-1:50pm    Client Initial Individualized Goals for Treatment:Katerina verbalizes the following treatment/discharge goals: \"To decrease the fear of each day. Increase my self confidence again. To be able to face the day. Decrease anxiety symptoms\".     Area of Treatment Focus:  Symptom Management, Develop / Improve Independent Living Skills and Physical Health     Therapeutic Interventions/Treatment Strategies:  Support, Feedback, Structured Activity and Education    Response to Treatment Strategies:  Accepted Feedback, Gave Feedback, Listened, Attentive and Alert      Name of Group:  Sleep     Description and Outcome:   Case Studies were presented to patients. These case studies described characters who struggled with sleep. Patients discussed signs and symptoms of sleep deprivation present within each case study. Patients made suggestions for improved sleep within each story.  A hand-out on Sleep Hygiene was given and patients were encouraged to adapt healthy changes to their routine .   Assessment  Appearance/ Mood: Calm behavior, range in affect, stable mood throughout group. Affect was consistent with mood.  Appropriate dress, well-groomed and was cooperative within group.   Thought Process: Linear and logical, productive and goal directed. Contributed to group conversation.   Behavior: Cooperative, interacted within the group, listened and was respectful to others  Areas for growth: Patient would benefit from the application of skills to daily life and reporting to group after completion.      Patient verbalized understanding. She stated that she needs to talk to her doctor about her sleeping medications, \"I feel overmedicated\" then \"sometimes I feel that I don't sleep enough.\" Patient described she often wakes up at 5 in the morning but doesn't want to get up until 7 am. Strategies were given to promote better sleep.     Is this a " Weekly Review of the Progress on the Treatment Plan?  No

## 2018-03-06 NOTE — PROGRESS NOTES
"  Adult Mental Health Outpatient Group Therapy Progress Note     Client Initial Individualized Goals for Treatment:Katerina verbalizes the following treatment/discharge goals: \"To decrease the fear of each day. Increase my self confidence again. To be able to face the day. Decrease anxiety symptoms\".      See Initial Treatment suggestions for the client during the time between Diagnostic Assessment and completion of the Master Individualized Treatment Plan.    Treatment Goals:  . Will plan and problem-solve to return to previous socia, enjoyable activities and decrease lonliness and isolation  Will plan and problem-solve to decrease fears and anxiety and increase self confidence  Client will notify staff when needing assistance to develop or implement a coping plan to manage suicidal or self injurious urges.     Area of Treatment Focus:  Symptom Management and Develop Socialization / Interpersonal Relationship Skills    Therapeutic Interventions/Treatment Strategies:  Support, Feedback, Structured Activity, Clarification and Education    Response to Treatment Strategies:  Accepted Feedback, Gave Feedback, Listened, Focused on Goals, Attentive, Accepted Support and Alert    Name of Group:  Mental health management     Description and Outcome:  The group was provided with a guided breathing and warmup structure with focus on increasing self awareness and on providing an embodied experience.  Discussion included the importance of listening to body cues as a way of identifying emotion as well as accompanying needs and wants, and as a way of practising self compassion, authenticity, mindfulness, self expression,  and connection to other.  The group focused on use of breath to increase calm/peace of mind. Katerina demonstrated understanding of session by engaging in experiential.  She shared her image of sitting on rock by beach with support people behind her.    Is this a Weekly Review of the Progress on the Treatment " Plan?  No

## 2018-03-06 NOTE — PROGRESS NOTES
"[facility]   Psychiatric Progress Note        Interim History:   From H&P: Katerina Crump is a 71 year old female referred to the [facility] Plus Program by herself. This is her second time in the program and she finds it quiet helpful. Mrs. Crump has a history of bipolar disorder, however she is disputing this diagnosis saying that she has never been manic or hypomanic, she is usually depressed and anxious. States she was first diagnosed with depression at age 16, after one of her friends past away. She has been hospitalized twice, once in the 70's and once in 2015 at [facility]. She has never attempted suicide and has not had ECT. Her PCP is Dr. Coleman and her psychiatrist is Dr. Oneal whom she saw in April and will see again July 3rd. Current episode of depression and anxiety started in the spring of 2015 after a sergery to remove squamous cell carcinoma. She became very depressed, worried, unable to sleep, relax, eat or function in any way. She was contemplating suicide. She was hospitalized for 3 weeks at [facility] under Dr. Pereyra. She was discharged on regiment of Depakote, Klonopin Zyprexa and Vistaril. She was relatively stable until March of this year. She was scheduled to have an injection in March for osteoarthritis but does not feel that this has been stressful for her. She can't identify any stressors that may have contributed to her current mental states. Current symptoms include: negative thinking/image, high anxiety, poor sleep and appetite (lost 8 lbs this month), shakiness, racing thoughts, restlessness, fear of being alone, depression, and inability to function. She denies SI, SIB. She is avoiding her family and friends \"I don't want to be seen this way\". Her psychiatrist recently increased Depakote to a 1000 mg a day but she is not feeling much better.  Denies panic attacks, hallucinations, delusions, suicidal and homicidal ideation, self injuries " "behaviors, memory problems, obsessions, compulsions, specific fears, and manic symptoms. She is wondering if she needs to be hospitalised but states she hated the experience and does not want to go to the hospital.   The client's care was discussed with the treatment team during the daily team meeting and/or staff's chart notes were reviewed.  Staff report Katerina continues to be anxious and focusing on the negative. She has difficulties identifying anything positive in her life.  She is attending groups and participating appropriately.     12/5/17: Met with client for follow up. Continues to feel very anxious, states nothing worked so far. She is discouraged. Spends her days laying on the couch, does some house chores and go out once in a while. Denies SI, SIB. Depression is moderate. Denies manic and psychotic symptoms. Sleeps 8-9 hours each night. Believes appetite is good \"I always have trouble gaining weight\". Memory and concentration are good, however appear forgetful. She sees her psychiatrist every 2 weeks for med adjustment. We discussed ECT treatment, she does not want to go with it for now. Discussed medications changes. States she is now on Cogentin but wasn't sure about the dose. Propranolol was decreased to \"half a tablet at bed time\".  She saw her psychiatrist 2 weeks ago who refer her to another psychiatrist for a 2nd opinion which will ocure next week. She is also seeing a NP tomorrow \"a medication specialist, to go over my medication\".     3/6/18: Met with patient for follow up. States she is not doing well, anxiety and depression are high. However, states that she was able to tolerate being alone on Saturday because her  had to work. Reports that she can't be alone because \"I have a very low self esteem, and can't stand it\". Reports multiple physical problems including digestion, constipation, and new onset of back pain. States her GI problems cassie related to anxiety, the back pain is from a " "pulled muscle. She is seeing a PT once a week. Reports that sleep and appetite are \"OK\".   Katerina exercise on a daily basis by walking and doing yoga. She sees her friends about once a week. She is bored.  has a part time job, which she doesn't like. She talks to her children at least once a week.   Linette is seeing a therapist once a week and her psychiatrist once a month.  Recently started on Seroquel bid for anxiety and increase dose of Klonopin to tid.          Medications:   Does not remember the exact doses of her medications, but thinks she has the following meds:  1. Depakote 250 mg, qhs  2. Seroquel 25 mg, bid  3. Klonopin 0.5 mg, tid   4. Gabapentin, 3tab in am, 4 tab at noon and 4 tab at bed time.         Allergies:     Allergies   Allergen Reactions     Keflex [Cephalexin]      Nausea hard to eat          Labs:   No results found for this or any previous visit (from the past 672 hour(s)).         Psychiatric Examination:                      Weight is 0 lbs 0 oz  There is no height or weight on file to calculate BMI.    Appearance: well groomed, awake, alert, cooperative, mild distress and thin  Attitude:  cooperative  Eye Contact:  good  Mood:  anxious  Affect:  mood congruent  Speech:  clear, coherent  Psychomotor Behavior:  no evidence of tardive dyskinesia, dystonia, or tics  Throught Process:  logical and goal oriented  Associations:  no loose associations  Thought Content:  no evidence of suicidal ideation or homicidal ideation, no auditory hallucinations present and no visual hallucinations present  Insight:  fair  Judgement:  fair  Oriented to:  time, person, and place  Attention Span and Concentration:  intact  Recent and Remote Memory:  fair         Precautions:           DIagnoses:   1. Bipolar Effective Disorder  2. Generalized Anxiety Disorder, recurent, severe         Plan:   1. Continue Senior Outpatient Program.   2. Continue current medications.   3. The patient was encourage to " follow up with PCP and Psychiatrist.   4. The patient was advised to let us know if inpatient admission or further help is needed.  5. The patient was advised to get involved in the community in order to continues to improve. Care was coordinated with the treatment team.  Celso GURRLOA CNP  Date: 03/06/18  Time: 2:42 PM

## 2018-03-06 NOTE — PROGRESS NOTES
"  Adult Mental Health Outpatient Group Therapy Progress Note     Client Initial Individualized Goals for Treatment:Katerina verbalizes the following treatment/discharge goals: \"To decrease the fear of each day. Increase my self confidence again. To be able to face the day. Decrease anxiety symptoms\".      See Initial Treatment suggestions for the client during the time between Diagnostic Assessment and completion of the Master Individualized Treatment Plan.    Treatment Goals:     see above     Area of Treatment Focus:  Symptom Management and Develop Socialization / Interpersonal Relationship Skills    Therapeutic Interventions/Treatment Strategies:  Support, Feedback, Structured Activity, Clarification and Education    Response to Treatment Strategies:  Accepted Feedback, Gave Feedback, Listened, Focused on Goals, Attentive, Accepted Support and Alert    Description and Outcome    Name of Group: Group Psychotherapy  Session 1: Katerina reported that she had a relatively good week end. She was able to go shopping by herself, which she used to be too anxious to do. She also drove herself to group today on snowy roads, which was stepping out of her comfort zone. She stated that her  was driving her, they got about four blocks and she asked him to turn around and go home so she could drop him off and drive herself. However, she still had to add that there were some anxiety provoking things, but she was not going to go into it because she wanted to stay positive. The group was extremely supportive.  Session 2: The group shared their thoughts and practices for dealing with anxiety. The concesus is that the most important piece is to recognize the signs that anxiety is rising, such as rapid heart rate and shorter shallow breathing. It is also helpful to later process what was happening when first exhibiting these symptoms.      Client demonstrated an understanding of the group by actively participating in the discussion " and sharing information pertinent to the topic at hand, as well as offering support to peers.         Is this a Weekly Review of the Progress on the Treatment Plan?  No

## 2018-03-09 ENCOUNTER — HOSPITAL ENCOUNTER (OUTPATIENT)
Dept: BEHAVIORAL HEALTH | Facility: CLINIC | Age: 73
End: 2018-03-09
Attending: NURSE PRACTITIONER
Payer: MEDICARE

## 2018-03-09 PROCEDURE — H2012 BEHAV HLTH DAY TREAT, PER HR: HCPCS

## 2018-03-09 NOTE — PROGRESS NOTES
"Adult Mental Health Outpatient Group Therapy Progress Note     Client Initial Individualized Goals for Treatment: \"To decrease the fear of each day. Increase my self confidence again. To be able to face the day. Decrease anxiety symptoms\".    Treatment Goals:   Will plan and problem-solve to return to previous socia, enjoyable activities and decrease lonliness and isolation  Will plan and problem-solve to decrease fears and anxiety and increase self confidence  Client will notify staff when needing assistance to develop or implement a coping plan to manage suicidal or self injurious urges.       Area of Treatment Focus:  Symptom Management, Personal Safety and Develop / Improve Independent Living Skills    Therapeutic Interventions/Treatment Strategies:  Support, Redirection, Feedback, Limit/Boundaries, Safety Assessments, Structured Activity, Clarification, Education, Motivational Enhancement Therapy and Cognitive Behavioral Therapy    Response to Treatment Strategies:  Accepted Feedback, Gave Feedback, Listened, Focused on Goals, Attentive, Accepted Support, Alert and Demonstrates Behavior Change    Name of Group: Group Psychotherapy  Session 1: Katerina shared with the group that she had a fairly good week end. She babysat for her 4 year old granddaughter and also went walking with her daughter. She is trying to get herself scheduled for the weekend so she can stay active. She feels trapped in her house and is frustrated because \"this is my home and I love it\". She agrees that she needs to find some activities for herself so she doesn't have to depend upon her  to take care of her. This just generates more anxiety and also tension between them.  Hour 2: The group shared thoughts on their own various ways to manage anxiety and then practiced some meditation with guided imagery to get a sense of how that might work for them in certain situations.      Description and Outcome:  Client verbalized understanding of " sessions content by discussing with the group the methods that worked best for them, when dealing with anxiety and providing appropriate feedback.      Is this a Weekly Review of the Progress on the Treatment Plan?  Yes.      Are Treatment Plan Goals being addressed?  Yes, continue treatment goals      Are Treatment Plan Strategies to Address Goals Effective?  Yes, continue treatment strategies      Are there any current contracts in place?  No

## 2018-03-09 NOTE — PROGRESS NOTES
Adult Mental Health Outpatient Group Therapy Progress Note       See Initial Treatment suggestions for the client during the time between Diagnostic Assessment and completion of the Master Individualized Treatment Plan.    Treatment Goals:     Will plan and problem-solve to return to previous socia, enjoyable activities and decrease lonliness and isolation  Will plan and problem-solve to decrease fears and anxiety and increase self confidence                       Status: Active  Client will notify staff when needing assistance to develop or implement a coping plan to manage suicidal or self injurious urges.       Area of Treatment Focus:  Symptom Management and Develop Socialization / Interpersonal Relationship Skills    Therapeutic Interventions/Treatment Strategies:  Support, Feedback, Structured Activity, Problem Solving, Clarification and Education    Response to Treatment Strategies:  Accepted Feedback, Gave Feedback, Listened, Focused on Goals, Attentive, Accepted Support and Alert    Name of Group:  Coping with Depression     Description and Outcome:  Client learned about ways to set boundaries and how setting boundaries can help to reduce depression. Client could not identify anyone that she wanted to set boundaries with, but said that her  should set boundaries with her. This was in reference to her request that he is always with her. When asked how she could problem solve this, she was uncertain. We discussed the importance of distraction and self-care when anxious.    Client demonstrates an understanding of group content by joining in a discussion of the  Topic.    Is this a Weekly Review of the Progress on the Treatment Plan?  No

## 2018-03-09 NOTE — PROGRESS NOTES
"  Adult Mental Health Outpatient Group Therapy Progress Note     Client Initial Individualized Goals for Treatment:Katerina verbalizes the following treatment/discharge goals: \"To decrease the fear of each day. Increase my self confidence again. To be able to face the day. Decrease anxiety symptoms\".      See Initial Treatment suggestions for the client during the time between Diagnostic Assessment and completion of the Master Individualized Treatment Plan.    Treatment Goals:  . Will plan and problem-solve to return to previous socia, enjoyable activities and decrease lonliness and isolation  Will plan and problem-solve to decrease fears and anxiety and increase self confidence  Client will notify staff when needing assistance to develop or implement a coping plan to manage suicidal or self injurious urges.     Area of Treatment Focus:  Symptom Management and Develop Socialization / Interpersonal Relationship Skills    Therapeutic Interventions/Treatment Strategies:  Support, Feedback, Structured Activity, Clarification and Education    Response to Treatment Strategies:  Accepted Feedback, Gave Feedback, Listened, Focused on Goals, Attentive, Accepted Support and Alert    Name of Group:  Mental health management     Description and Outcome:    Facilitated chair yoga class with focus on bringing awareness to body sensations and breath. Ended with short breath meditation.Katerina shared she felt less anxious, calmer after class which is progress for her but not able to state any ways she could take skills out of class to use at home.      Is this a Weekly Review of the Progress on the Treatment Plan?  No        "

## 2018-03-13 ENCOUNTER — HOSPITAL ENCOUNTER (OUTPATIENT)
Dept: BEHAVIORAL HEALTH | Facility: CLINIC | Age: 73
End: 2018-03-13
Attending: NURSE PRACTITIONER
Payer: MEDICARE

## 2018-03-13 PROCEDURE — H2012 BEHAV HLTH DAY TREAT, PER HR: HCPCS

## 2018-03-14 ENCOUNTER — OFFICE VISIT (OUTPATIENT)
Dept: NEUROPSYCHOLOGY | Facility: CLINIC | Age: 73
End: 2018-03-14
Payer: COMMERCIAL

## 2018-03-14 DIAGNOSIS — F41.1 GENERALIZED ANXIETY DISORDER: ICD-10-CM

## 2018-03-14 DIAGNOSIS — F09 COGNITIVE DISORDER: Primary | ICD-10-CM

## 2018-03-14 NOTE — TELEPHONE ENCOUNTER
----- Message from Dylon Rahman sent at 3/14/2018  8:32 AM CDT -----  Regarding: need more appts for 55+  Pt needs more appts for 55+ program in C track under [facility] [place] please.  Please start appts from 3/13/18.  Thanks!

## 2018-03-14 NOTE — PROGRESS NOTES
The patient was seen for neuropsychological evaluation at the request of Anat Hendrix for the purposes of diagnostic clarification and treatment planning.  Two hours of face-to-face testing were provided by this writer.  Please see Dr. Kristi Chung's report for a full interpretation of the findings.

## 2018-03-14 NOTE — MR AVS SNAPSHOT
After Visit Summary   3/14/2018    Katerina Crump    MRN: [id number]           Patient Information     Date Of Birth          1945        Visit Information        Provider Department      3/14/2018 8:30 AM Kristi Chung PsyD [facility]        Today's Diagnoses     Cognitive disorder    -  1    Generalized anxiety disorder           Follow-ups after your visit        Your next 10 appointments already scheduled     Mar 27, 2018 10:00 AM CDT   Return Visit with SOP TRACK C   [facility] ([facility])    [address] [place] [address]   [phone]            Mar 30, 2018 10:00 AM CDT   Return Visit with SOP TRACK C   [facility] ([facility])    [address] [place] [address]   [phone]            Apr 03, 2018 10:00 AM CDT   Return Visit with SOP TRACK C   [facility] ([facility])    [address] [place] [address]   [phone]            Apr 06, 2018 10:00 AM CDT   Return Visit with SOP TRACK C   [facility] ([facility])    [address] [place] [address]   [phone]              Who to contact     Please call your clinic at [phone] to:    Ask questions about your health    Make or cancel appointments    Discuss your medicines    Learn about your test results    Speak to your doctor            Additional Information About Your Visit        MyChart Information     [employer] is an electronic gateway that provides easy, online access to your medical records. With [employer], you can request a clinic appointment, read your test results, renew a prescription or communicate with your care team.     To sign up for [employer] visit the website at [email or web address]   You will  be asked to enter the access code listed below, as well as some personal information. Please follow the directions to create your username and password.     Your access code is: [email or web address]  Expires: 2018  7:30 AM     Your access code will  in 90 days. If you need help or a new code, please contact your [facility] or call [phone] for assistance.        Care EveryWhere ID     This is your Care EveryWhere ID. This could be used by other organizations to access your [facility] medical records  [id number]         Blood Pressure from Last 3 Encounters:   17 118/49   10/12/15 121/67   07/24/15 129/78    Weight from Last 3 Encounters:   17 48.1 kg (106 lb)   10/11/15 47.9 kg (105 lb 9.6 oz)   07/24/15 46.3 kg (102 lb)              We Performed the Following     [id number] TESTING, PER HR/PSYCHOLOGIST     NEUROPSYCH TESTING BY [facility]        Primary Care Provider Office Phone # Fax #    Rommel Coleman [phone]       [facility] [address]  [facility] [address]        Equal Access to Services     SON ALVARADO AH: Hadii aad ku hadasho Soomaali, waaxda luqadaha, qaybta kaalmada adeegyada, georges turkin nikkon compa [place]. So [facility] [phone].    ATENCIÓN: Si habla español, tiene a garrido disposición servicios gratuitos de asistencia lingüística. Jocelin al [phone].    We comply with applicable federal civil rights laws and [place] laws. We do not discriminate on the basis of race, color, national origin, age, disability, sex, sexual orientation, or gender identity.            Thank you!     Thank you for choosing [facility] NEUROP[facility]CHOLOG[facility]  for your care. Our goal is always to provide you with excellent care. Hearing back from our patients is one way we can continue to improve our services. Please take a few minutes to complete the written survey that you may receive in the mail after your visit with us. Thank you!              Your Updated Medication List - Protect others around you: Learn how to safely use, store and throw away your medicines at www.disposemymeds.org.          This list is accurate as of 3/14/18 11:59 PM.  Always use your most recent med list.                   Brand Name Dispense Instructions for use Diagnosis    calcium carbonate 1250 MG tablet    OS- mg [place]. Ca     Take 500 mg by mouth 2 times daily as needed        clonazePAM 0.5 MG tablet    klonoPIN    30 tablet    Take 0.5 tablets (0.25 mg) by mouth daily as needed for anxiety    Depression with anxiety       DEPAKOTE PO      Take 750 mg by mouth At Bedtime        GABAPENTIN PO      Take 900 mg by mouth 3 times daily        MELATONIN PO      Take 10 mg by mouth At Bedtime        multivitamin, therapeutic Tabs tablet      Take 1 tablet by mouth daily        * ZYPREXA PO      Take 2.5 mg by mouth every morning        * ZYPREXA PO      Take 5 mg by mouth At Bedtime        * Notice:  This list has 2 medication(s) that are the same as other medications prescribed for you. Read the directions carefully, and ask your doctor or other care provider to review them with you.

## 2018-03-14 NOTE — PROGRESS NOTES
"  Adult Mental Health Outpatient Group Therapy Progress Note     Client Initial Individualized Goals for Treatment:Katerina verbalizes the following treatment/discharge goals: \"To decrease the fear of each day. Increase my self confidence again. To be able to face the day. Decrease anxiety symptoms\".      See Initial Treatment suggestions for the client during the time between Diagnostic Assessment and completion of the Master Individualized Treatment Plan.    Treatment Goals:  . Will plan and problem-solve to return to previous socia, enjoyable activities and decrease lonliness and isolation  Will plan and problem-solve to decrease fears and anxiety and increase self confidence  Client will notify staff when needing assistance to develop or implement a coping plan to manage suicidal or self injurious urges.       Area of Treatment Focus:  Symptom Management and Develop Socialization / Interpersonal Relationship Skills    Therapeutic Interventions/Treatment Strategies:  Support, Feedback, Structured Activity, Clarification and Education    Response to Treatment Strategies:  Accepted Feedback, Gave Feedback, Listened, Focused on Goals, Attentive, Accepted Support and Alert    Name of Group:  Mental health management     Description and Outcome:  The group was provided with a guided breathing and warmup structure with focus on increasing self awareness and on providing an embodied experience.  Discussion included the importance of listening to body cues as a way of identifying emotion as well as accompanying needs and wants, and as a way of practising self compassion, authenticity, mindfulness, self expression,  and connection to other.  The group developed a theme around celebration, both of each other and oneself.  Katerina demonstrated understanding of session by engaging in experiential and exploring theme.  She reported back pain being a distractor for her. When given feedback regarding her movement, Katerina states she " is trying to keep a positive outlook.    Is this a Weekly Review of the Progress on the Treatment Plan?  No

## 2018-03-16 ENCOUNTER — HOSPITAL ENCOUNTER (OUTPATIENT)
Dept: BEHAVIORAL HEALTH | Facility: CLINIC | Age: 73
End: 2018-03-16
Attending: NURSE PRACTITIONER
Payer: MEDICARE

## 2018-03-16 PROCEDURE — H2012 BEHAV HLTH DAY TREAT, PER HR: HCPCS

## 2018-03-16 NOTE — PROGRESS NOTES
"Group Therapy Progress Notes   Client Initial Individualized Goals for Treatment:Katerina verbalizes the following treatment/discharge goals: \"To decrease the fear of each day. Increase my self confidence again. To be able to face the day. Decrease anxiety symptoms\".        See Initial Treatment suggestions for the client during the time between Diagnostic Assessment and completion of the Master Individualized Treatment Plan.     Treatment Goals:  . Will plan and problem-solve to return to previous socia, enjoyable activities and decrease lonliness and isolation  Will plan and problem-solve to decrease fears and anxiety and increase self confidence  Client will notify staff when needing assistance to develop or implement a coping plan to manage suicidal or self injurious urges.       Area of Treatment Focus:  Symptom Management, Personal Safety and Community Resources/Discharge Planning    Therapeutic Interventions/Treatment Strategies:  Support, Feedback, Safety Assessments, Problem Solving, Clarification and Education    Response to Treatment Strategies:  Accepted Feedback, Listened, Attentive and Alert    Name of Group:  Mental Health Education    Progress Note  Katerina agreed to participate in a Guided Meditation exercise to help alleviate anxiety.  Katerina states that she felt that the meditation was helpful however she felt that the speaker's voice was annoying.  Katerina states that she would like to engage in more guided meditation on a regular basis.             Is this a Weekly Review of the Progress on the Treatment Plan?  No     "

## 2018-03-16 NOTE — PROGRESS NOTES
"  Adult Mental Health Outpatient Group Therapy Progress Note       See Initial Treatment suggestions for the client during the time between Diagnostic Assessment and completion of the Master Individualized Treatment Plan.    Treatment Goals:     Will plan and problem-solve to return to previous socia, enjoyable activities and decrease lonliness and isolation  Will plan and problem-solve to decrease fears and anxiety and increase self confidence                       Status: Active  Client will notify staff when needing assistance to develop or implement a coping plan to manage suicidal or self injurious urges.       Area of Treatment Focus:  Symptom Management, Community Resources/Discharge Planning and Develop Socialization / Interpersonal Relationship Skills    Therapeutic Interventions/Treatment Strategies:  Support, Feedback, Structured Activity, Problem Solving, Clarification and Education    Response to Treatment Strategies:  Accepted Feedback, Gave Feedback, Listened, Focused on Goals, Attentive, Accepted Support and Alert    Name of Group:  Psychotherapy      Description and Outcome:  In session one client reports continued anxiety. She is especially upset about comments made to primary therapist here on Tuesday where she requested a different group. Client states \"I worried and fretted about this. It was toxic and negative. I am in a deep rut\". Client was assured that she can let that go and that staff are not judging her for this. She seem to relax a bit when she heard this, but then moved on to other worries. She states \"I don't know what to do. I have no coping skills\". We discussed the importance of distraction and this lead to session 2 topic. Katerina assures the group that she is safe and feels that she will get better. She has plans this weekend to babysit her little granddaughter, although feels somewhat anxious about this too.  Client demonstrates an understanding of the group content by  " participating, sharing and giving feedback.    In session two client participated in brainstorming distractions to use when feeling anxious and then participated in  an exercise to list those distractions that she would like to use. Client was encouraged to post these somewhere easily accessible.  Client demonstrates an understanding of the group content by adding to the discussion and listing distractions to use.    Is this a Weekly Review of the Progress on the Treatment Plan?  Yes.      Are Treatment Plan Goals being addressed?  Yes, continue treatment goals      Are Treatment Plan Strategies to Address Goals Effective?  Yes, continue treatment strategies      Are there any current contracts in place?  No

## 2018-03-16 NOTE — PROGRESS NOTES
"Group Therapy Progress Notes   Client Initial Individualized Goals for Treatment:Katerina verbalizes the following treatment/discharge goals: \"To decrease the fear of each day. Increase my self confidence again. To be able to face the day. Decrease anxiety symptoms\".        See Initial Treatment suggestions for the client during the time between Diagnostic Assessment and completion of the Master Individualized Treatment Plan.     Treatment Goals:  . Will plan and problem-solve to return to previous socia, enjoyable activities and decrease lonliness and isolation  Will plan and problem-solve to decrease fears and anxiety and increase self confidence  Client will notify staff when needing assistance to develop or implement a coping plan to manage suicidal or self injurious urges.       Area of Treatment Focus:  Symptom Management, Personal Safety and Community Resources/Discharge Planning    Therapeutic Interventions/Treatment Strategies:  Support, Feedback, Safety Assessments, Problem Solving, Clarification and Education    Response to Treatment Strategies:  Accepted Feedback, Listened, Attentive and Alert    Name of Group:  Mental Health Education/Wellness    Progress Note  Facilitated topic on DBT skill, Distress Tolerance. Group gave example of distress from their own lives. We reviewed WISE MIND concept, then went into the crisis skills of Wise Mind Accepts. All participated and able to verbalize personal understanding of concepts. They shared action steps they have taken and also ways they sooth self and help make cognitive shifts to more positive thinking. Katerina identified her chronic anxiety has very distressing and feels good when she can listen to her children talk about themselves as \" she feels very wrapped up in her own suffering\". Expressed her spouse too is supportive of her and this is a gratitude and that she prays during the day which helps and is a positive action.       Is this a Weekly Review of the " Progress on the Treatment Plan?  No

## 2018-03-20 ENCOUNTER — HOSPITAL ENCOUNTER (OUTPATIENT)
Dept: BEHAVIORAL HEALTH | Facility: CLINIC | Age: 73
End: 2018-03-20
Attending: NURSE PRACTITIONER
Payer: MEDICARE

## 2018-03-20 PROCEDURE — H2012 BEHAV HLTH DAY TREAT, PER HR: HCPCS

## 2018-03-20 NOTE — PROGRESS NOTES
"  Adult Mental Health Outpatient Group Therapy Progress Note     Client Initial Individualized Goals for Treatment:Katerina verbalizes the following treatment/discharge goals: \"To decrease the fear of each day. Increase my self confidence again. To be able to face the day. Decrease anxiety symptoms\".      See Initial Treatment suggestions for the client during the time between Diagnostic Assessment and completion of the Master Individualized Treatment Plan.    Treatment Goals:  . Will plan and problem-solve to return to previous socia, enjoyable activities and decrease lonliness and isolation  Will plan and problem-solve to decrease fears and anxiety and increase self confidence  Client will notify staff when needing assistance to develop or implement a coping plan to manage suicidal or self injurious urges.     Area of Treatment Focus:  Symptom Management and Develop Socialization / Interpersonal Relationship Skills    Therapeutic Interventions/Treatment Strategies:  Support, Feedback, Structured Activity, Clarification and Education    Response to Treatment Strategies:  Accepted Feedback, Gave Feedback, Listened, Focused on Goals, Attentive, Accepted Support and Alert    Name of Group:  Mental health management     Description and Outcome:  The group was provided with a guided breathing and warmup structure with focus on increasing self awareness and on providing an embodied experience.  Discussion included the importance of listening to body cues as a way of identifying emotion as well as accompanying needs and wants, and as a way of practising self compassion, authenticity, mindfulness, self expression,  and connection to other.  The group utilized SIFT (sensation, images, feeling, thoughts) to observe self.  The group was encouraged to make observations about what needed attention.  Client demonstrated understanding of session by engaging in experiential and sharing observations with peers.  Katerina shared two " images that she was working with to decrease anxiety.  She reported feeling calmer at end of session.    Is this a Weekly Review of the Progress on the Treatment Plan?  No

## 2018-03-21 NOTE — PROGRESS NOTES
"  Adult Mental Health Outpatient Group Therapy Progress Note     Client Initial Individualized Goals for Treatment:Katerina verbalizes the following treatment/discharge goals: \"To decrease the fear of each day. Increase my self confidence again. To be able to face the day. Decrease anxiety symptoms\".      See Initial Treatment suggestions for the client during the time between Diagnostic Assessment and completion of the Master Individualized Treatment Plan.    Treatment Goals:     see above     Area of Treatment Focus:  Symptom Management and Develop Socialization / Interpersonal Relationship Skills    Therapeutic Interventions/Treatment Strategies:  Support, Feedback, Structured Activity, Clarification and Education    Response to Treatment Strategies:  Accepted Feedback, Gave Feedback, Listened, Focused on Goals, Attentive, Accepted Support and Alert    Description and Outcome    Name of Group: Group Psychotherapy, Coping With Change  Session 1: Katerina shared that her week end went fairly well. She and her  babysat that granddaughter and fun with that. She also got for a walk and had lunch with a friend. She reported that her  is growing quite frustrated with her apparent lack of progress. He and her children have encouraged her to pursue help from a different provider than her present psychiatrist and she is considering this.  Session 2: The group discussed the importance of following through with their own work outside of group. Also the importance of being realistic about how much change can be expected by medication and keep in mind that the majority of the change in one's own attitude and behavior.  Coping With Change: The group took time to discuss how they handle change. There was also time to express their feelings when informed that their primary therapist would be retiring.      Client demonstrated an understanding of the group by actively participating in the discussion and sharing information " pertinent to the topic at hand, as well as offering support to peers.         Is this a Weekly Review of the Progress on the Treatment Plan?  No

## 2018-03-23 ENCOUNTER — HOSPITAL ENCOUNTER (OUTPATIENT)
Dept: BEHAVIORAL HEALTH | Facility: CLINIC | Age: 73
End: 2018-03-23
Attending: NURSE PRACTITIONER
Payer: MEDICARE

## 2018-03-23 PROCEDURE — H2012 BEHAV HLTH DAY TREAT, PER HR: HCPCS

## 2018-03-23 NOTE — PROGRESS NOTES
COWART ORIENTATION TEST WAIS-IV                                    Raw           Age Scaled    Score           100  Vocabulary  34     9     Block Design  28     9      WECHSLER MEMORY SCALES Coding  65   13         Immed.   30 Min Digit Span  21     8    Logical Memory      12/9   9/8  Comprehension  15     7    Visual Reproduction   8    3     30 Minute Recognition   1    PORTEUS MAZE TEST       ALEJANDRO AUDITORY VERBAL LEARNING TEST  Test Age           15.0        Test Quotient         89         I  II  III IV  V VI VII      5    8     10     11     10       5      9  PSYCHOMOTOR TESTS        30 Minute Recall  10       Right    Left    30 Minute Recognition  15  Grooved Pegboard   77     95     Intrusions    1   Drops: 0          Drops: 0        LETTER CANCELLATION TEST MAKE A FIGURE TEST        Time   207   Errors      0      Score    62        CANELA-GESTALT (3-figure) CONTROLLED WORD ASSOCIATION TEST       Recall  2  Score  32        BOSTON NAMING TEST TOKEN TEST       Score  49  Score  157

## 2018-03-23 NOTE — PROGRESS NOTES
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  Current medications are Zyprexa, gabapentin, Depakote, and melatonin.  She also participates in the [facility] intensive day treatment program.  She s experiencing cognitive inefficiencies, prompting this neuropsychological evaluation, requested by her psychiatrist, Dr. Anat Hendrix, to help with treatment planning.     The second of 10 children, she was born in [facility] in [facility] and lived there until about age 16, when the family relocated to the [facility].  Her parents farmed, her father later held a factory job.  Ms. Crump graduated from high school and completed a quarter at [employer], receiving average grades.  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  She lives in [place] with her spouse of 48 years, a retired .  They have two daughters and a son, and five grandchildren.     BEHAVIORAL OBSERVATIONS AND CLINICAL INTERVIEW FINDINGS:    They arrived on time.  The patient presents as a visibly highly anxious petit, lean older woman with short grey hair and eyeglasses, neat in layered tops, corduroy slacks, and black shoes.  She immediately announced that she was highly nervous today.  Speech was pressured and hurried.  She blurted out observations and questions, often not giving me a chance to respond before the next question was blurted out.  Her  Tolu was included in the interview, and it was helpful to get his perspective.    I learned Ms. Crump has had other episodes of depression and anxiety in her life  and was once diagnosed with Bipolar Disorder during a 2015 hospitalization, since regarded as a dubious diagnosis.  Her spouse notes that she has always been rather timid and unsure of herself, which he relates to her very negative, critical mother and an alcoholic father, making for a stressful early life.  By age 14 she was taking Valium.  At least six of the siblings became alcoholic.  Ms. Crump went through post-partum depression after her first child was born.  In 2015 she was stressed by health problems.  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  Ultimately she was psychiatrically hospitalized at [facility] for three weeks that year after voicing suicidal thoughts, which she did not act on.  She was discharged on several psychotropics.  Previous episodes of anxiety were briefer and less intense, but she has been highly anxious for at least a year now despite aggressive treatment.   I worry about too many things.   She was very worried about this appointment and got up at 4:00 AM this morning as she couldn t sleep.  Recently she requested a change in therapists at the 55 + program, thinking a change might be beneficial, then worried the request would upset the counselor.  The latest episode of depression was set off by a seemingly minor medical decision that had to be made regarding a recommended injection to treat osteoporosis.   I have a hard time making decisions.   She thinks the anxiety affects her physically, causing diarrhea, worsening back pain.  With the help of assorted sleep aids, she usually sleeps fairly well, and did get six hours of rest last night despite the early awakening.      She realizes keeping active could help take her mind off things and lessen the anxiety, but finds it hard to implement.  She has done quilting in the past and started cutting some  squares for that but finds it hard to get organized and follow through.  She s also done crosstitching and enjoyed reading in the past.  She faithfully takes daily walks.      Cognitively she described the problem as feeling overwhelmed.  Recollection of recent events is somewhat unreliable.      The medical history is unremarkable for the purposes of this evaluation.  Specifically, she s not had any injuries or diseases involving the brain such as strokes and head injuries.  Aside from osteoporosis, osteoarthritis, and back pain, she has enjoyed good health.      Surgeries include left wrist repair after a fall on ice, the aforementioned squamous cell cancer resection, and bilateral cataract removal.      Regarding habits, she smoked for about four years in her youth.  Drug use of any kind is denied.  In the past she averaged a glass of wine nightly, but has been abstinent lately to avoid medication interactions.  She was never a heavy or problematic drinker.      Family history is mentionable for her father and five siblings who were alcoholic.  Fortunately, all eventually achieved abstinence.  Her mother had a TIA or possibly a stroke and seemed to by afflicted by a psychiatric condition of some sort.  She showed signs of forgetfulness in her mid-80s and  at 89 in a nursing home.  Her father  with lunch cancer at age 76.  A couple of maternal aunts may have had dementia symptoms late in life, details are unknown.      Throughout testing she remained extremely anxious despite our best efforts to help her feel at ease, and was quick to disparage herself and her performance.  There was a slightly tremor noticeable during motor tasks, unclear whether it was anxiety or a true tremor.  She was doubtful of her abilities throughout ( Oh I did really bad on that.    I m hyperventilating here. ) but seemed to try her best on everything.  Results are considered technically valid.    NEUROPSYCHOLOGICAL  FINDINGS:    Select intellectual abilities were assessed with subtests from the Wechsler Adult Intelligence Scale-IV.  Scores on the five subtests given ranged from below average to above average.  Specifically, social understanding, practical problem solving, and verbal reasoning (Comprehension) was below average.  Auditory attention span on a digit sequence learning exercise (Digit Span) was low average.  She could repeat up to five digits in the forward direction and three in the reverse order, and could mentally rearrange strings of up to five random numbers in correct numerical sequence.  Visuospatial processing and constructional abilities (Block Design) and word knowledge and expressive communicability (Vocabulary) were average.  Speeded graphomotor learning (Coding) was a clear strength, in the above average range.  She was both fast and accurate on this speeded transcription task.      She was fully oriented, providing the correct date, day of the week, and time of day.  Immediate memory for two story passages from the Wechsler Memory Scale-Revised was average with 21 of 50 story elements recalled immediately after presentation.  Retention of the material 30 minutes later was also average.  Word list learning (Bijan Auditory Verbal Learning Test) was also solidly average for learning over trials, with 10 of the 15 words learned by the last trial.  There were no intrusive errors.  Retention of the list after a brief distractor exercise was average, and recall 30 minutes later was above average (100% retention of the originally learned material).  All 15 words were correctly recognized when presented among a list of foils with just one intrusive errors (which was from the distractor list).  Immediate memory for figural material (four designs from the WMS) was average.  Free recall 30 minutes later was below average to mildly impaired, but with a visual cue, she remembered essentially all of the originally  learned material.  Just one of the figures was correctly recognized when presented in multiple choice format.  Her copy drawings of three Thomas Gestalt figures were within normal limits for age.  All three figures were recalled immediately after presentation, with slight distortion.      Nonverbal associative fluency on the Make A Figure Test (producing novel designs under time constraints) was very superior.  Nonverbal planning and foresight, as demonstrated on a maze solving exercise (Porteus Maze Test), were above average.    Spontaneous speech was rapid, pressured, and laden with anxious preoccupations.  Comprehension, as measured by the Token Test, was low average with several errors made on the more difficult items in which she had to retain and carry out multistep commands.  Verbal associative fluency on the Controlled Oral Word Association Test (generating words beginning with target letters) was below average with 32 countable responses produced across the three 60 second trials.  Confrontation naming on the Houston Naming Test was below average with 49 of 60 pictured items correctly, spontaneously named.  There were a few possible semantic paraphasic errors (mask instead of muzzle; sieve instead of funnel) but no visual misperceptions.  Most of the errors were on the more advanced items and often she was able to benefit from phonemic cueing.  Fine motor speed and dexterity (Grooved Pegboard) was low average bilaterally, the right (dominant) hand faster than the left, as expected.  A biletter cancellation exercise requiring efficient visual scanning and sustained vigilance was completed slowly but with no errors.      CONCLUSIONS AND RECOMMENDATIONS:    This 72-year-old woman apparently has been characteristically unsure of herself and prone to worry on a lifelong basis, with episodes of post-partum depression and anxiety in the past, sometimes triggered by seemingly minor concerns.  For the last year or so  she has been more intensely anxious despite trials of psychotropics and participation in an intensive day program for at least a year.  She certainly looked anxious the day of this appointment, with pressured speech, and much out loud worrying.  Extremely high anxiety probably took a toll on test performance, having a distracting effect, but for the most part she was able to retain and follow instructions, and seemed to be motivated to do well.      The test findings were essentially within normal limits with only a few below average scores.  She had slight difficulty recalling figural material on one task, but remembered more when cues were provided, suggesting she had actually retained more than she was able to retrieve on the spot.  Confrontation naming was below average, as was verbal reasoning, requiring abstract thinking.  All other cognitive functions assessed were within the normal range.  She was fully oriented.  Short and long-term retention of story passages and a word list ranged from average to above average.  Intellectual abilities were intact, very likely at her normal baseline, with low average working memory, average word knowledge and nonverbal reasoning, and above average processing speeds.  Executive abilities such as nonverbal planning and foresight and nonverbal fluency were above average if not superior.  Psychomotor speeds were low normal for both hands on a fine motor dexterity task.  Comprehension (retaining and carrying out multistep commands) was low normal as was speeded word retrieval.  Drawings were well executed.  There were no signs of visual neglect or visual misperceptions.      In conclusion, these findings contraindicate an organic brain syndrome of any kind.  Daily functioning is almost certainly negatively impacted by extreme anxiety and constant preoccupations which make it difficult for her to manage any task requiring sustained effort.  The patient and her family are seeking  other opinions regarding the medication regimen, to see if more can be done for her from that standpoint.  I have no further recommendations.      Kristi Chung Psy.D.   Licensed Psychologist, L.P. [id number]  Diplomate in Clinical Neuropsychology, [facility]    The diagnostic impression for the purposes of this evaluation is Cognitive Impairment associated with Generalized Anxiety Disorder.  This evaluation included approximately three hours of testing administered by a psychometrist with interpretation by a neuropsychologist (CPT 61596) and an additional three hours of professional time spent on the interview, data integration, record review, and report preparation (CPT 13221).    DDR: (PADMINI)

## 2018-03-23 NOTE — PROGRESS NOTES
"Group Therapy Progress Notes   Client Initial Individualized Goals for Treatment:Katerina verbalizes the following treatment/discharge goals: \"To decrease the fear of each day. Increase my self confidence again. To be able to face the day. Decrease anxiety symptoms\".        See Initial Treatment suggestions for the client during the time between Diagnostic Assessment and completion of the Master Individualized Treatment Plan.     Treatment Goals:  . Will plan and problem-solve to return to previous socia, enjoyable activities and decrease lonliness and isolation  Will plan and problem-solve to decrease fears and anxiety and increase self confidence  Client will notify staff when needing assistance to develop or implement a coping plan to manage suicidal or self injurious urges.       Area of Treatment Focus:  Symptom Management, Personal Safety and Community Resources/Discharge Planning    Therapeutic Interventions/Treatment Strategies:  Support, Feedback, Safety Assessments, Problem Solving, Clarification and Education    Response to Treatment Strategies:  Accepted Feedback, Listened, Attentive and Alert    Name of Group:  Mental Health Education    Progress Note  Facilitated DBT informed topic on interpersonal effectiveness.  Utilized handout 1 of DBT skills training focusing on the following for concepts: # 1 attending to relationships, #2 balancing priorities versus demands, #3 balancing wants to shoulds, #4 building mastery and self-respect.    Katerina endorsed understanding material that having a hard time practicing skills due to her anxiety.  She identified topic #4 that she could take small steps to assert herself with her .       Is this a Weekly Review of the Progress on the Treatment Plan?  No     "

## 2018-03-23 NOTE — PROGRESS NOTES
"  Adult Mental Health Outpatient Group Therapy Progress Note       See Initial Treatment suggestions for the client during the time between Diagnostic Assessment and completion of the Master Individualized Treatment Plan.    Treatment Goals:     Will plan and problem-solve to return to previous socia, enjoyable activities and decrease lonliness and isolation  Will plan and problem-solve to decrease fears and anxiety and increase self confidence                       Status: Active  Client will notify staff when needing assistance to develop or implement a coping plan to manage suicidal or self injurious urges.       Area of Treatment Focus:  Symptom Management    Therapeutic Interventions/Treatment Strategies:  Support, Feedback, Structured Activity, Problem Solving, Clarification and Education    Response to Treatment Strategies:  Accepted Feedback, Gave Feedback, Listened, Focused on Goals, Attentive, Accepted Support and Alert    Name of Group:  Coping with Depression     Description and Outcome:  Client participated in a written self-expression exercise. She was very anxious through out the session and continually stated \"I did it wrong. I am too negative\". Client was assured by myself and the group members that there is no right or wrong way to do this. She left at one point of the group saying she was too anxious. She returned shortly.  Client demonstrates an understanding of group content by fully participating, writing, sharing and giving feedback.    Is this a Weekly review of progress on the treatment plan:   no  "

## 2018-03-27 ENCOUNTER — HOSPITAL ENCOUNTER (OUTPATIENT)
Dept: BEHAVIORAL HEALTH | Facility: CLINIC | Age: 73
End: 2018-03-27
Attending: NURSE PRACTITIONER
Payer: MEDICARE

## 2018-03-27 PROCEDURE — H2012 BEHAV HLTH DAY TREAT, PER HR: HCPCS

## 2018-03-27 NOTE — PROGRESS NOTES
"  Adult Mental Health Outpatient Group Therapy Progress Note       See Initial Treatment suggestions for the client during the time between Diagnostic Assessment and completion of the Master Individualized Treatment Plan.    Treatment Goals:     Will plan and problem-solve to return to previous socia, enjoyable activities and decrease lonliness and isolation  Will plan and problem-solve to decrease fears and anxiety and increase self confidence                       Status: Active  Client will notify staff when needing assistance to develop or implement a coping plan to manage suicidal or self injurious urges.       Area of Treatment Focus:  Symptom Management    Therapeutic Interventions/Treatment Strategies:  Support, Feedback, Structured Activity, Problem Solving, Clarification and Education    Response to Treatment Strategies:  Accepted Feedback, Gave Feedback, Listened, Focused on Goals, Attentive, Accepted Support and Alert    Name of Group:  Coping with Depression and Anxiety     Description and Outcome:  Client learned about \"Ten Ways to Deal with Anxiety Daily\" and participated in a discussion about ways to incorporate these concepts. Client identifies a desire to use affirmation daily.     Client demonstrates an understanding of the session by  participating, sharing and giving feedback.    Is this a Weekly Review of the Progress on the Treatment Plan?  No      "

## 2018-03-27 NOTE — PROGRESS NOTES
"  Adult Mental Health Outpatient Group Therapy Progress Note     Client Initial Individualized Goals for Treatment:Katerina verbalizes the following treatment/discharge goals: \"To decrease the fear of each day. Increase my self confidence again. To be able to face the day. Decrease anxiety symptoms\".      See Initial Treatment suggestions for the client during the time between Diagnostic Assessment and completion of the Master Individualized Treatment Plan.    Treatment Goals:  . Will plan and problem-solve to return to previous socia, enjoyable activities and decrease lonliness and isolation  Will plan and problem-solve to decrease fears and anxiety and increase self confidence  Client will notify staff when needing assistance to develop or implement a coping plan to manage suicidal or self injurious urges.       Area of Treatment Focus:  Symptom Management and Develop Socialization / Interpersonal Relationship Skills    Therapeutic Interventions/Treatment Strategies:  Support, Feedback, Structured Activity and Clarification    Response to Treatment Strategies:  Accepted Feedback, Gave Feedback, Listened, Focused on Goals, Attentive, Accepted Support and Alert    Name of Group:  Mental health management     Description and Outcome:  The group was provided with a guided breathing and warmup structure with focus on increasing self awareness and on providing an embodied experience.  Discussion included the importance of listening to body cues as a way of identifying emotion as well as accompanying needs and wants, and as a way of practising self compassion, authenticity, mindfulness, self expression,  and connection to other.  The group used the structure to explore embodiment of several emotional states.  The group chose fear, fierceness and confidence as emotions to explore further.  The group discovered that they could utilize the energy from fear to step forward, access the fierceness and step back with confidence.  " Client demonstrated understanding by engaging in the experiential and identifying ways in which they could use their fear, particularly energy produced by arousal state, to move forward or approach.  Katerina described feeling more confident at end of group.  She appeared more animated.    Is this a Weekly Review of the Progress on the Treatment Plan?  No

## 2018-03-27 NOTE — PROGRESS NOTES
"  Adult Mental Health Outpatient Group Therapy Progress Note     Client Initial Individualized Goals for Treatment:Katerina verbalizes the following treatment/discharge goals: \"To decrease the fear of each day. Increase my self confidence again. To be able to face the day. Decrease anxiety symptoms\".      See Initial Treatment suggestions for the client during the time between Diagnostic Assessment and completion of the Master Individualized Treatment Plan.    Treatment Goals:     see above     Area of Treatment Focus:  Symptom Management and Develop Socialization / Interpersonal Relationship Skills    Therapeutic Interventions/Treatment Strategies:  Support, Feedback, Structured Activity, Clarification and Education    Response to Treatment Strategies:  Accepted Feedback, Gave Feedback, Listened, Focused on Goals, Attentive, Accepted Support and Alert    Description and Outcome    Name of Group: Group Psychotherapy  Session 1: Katerina shared that she had a fairly good weekend. She went for a walk with her son on Saturday as well as by herself on Sunday. She indicated that she watched TV downstairs by herself, which is something she wouldn't have done in the recent past, but would've wanted her  with her. She is having second thoughts about seeing the nurse Practitioner who was evaluating her medications. It was pointed out that this is a constant pattern that she constantly wants to change providers and has done it on multiple occassions. She was encouraged to look for change in herself, rather than try and think that some other person or pill would change her life .  Session 2: The group discussed the changes taking place with the therapy staff. Particularly with the primary therapist leaving, an interim person coming in, and then the newly hired therapist starting. It was stressed that the group members would be the same and that they kind of support would be in place for everyone.       Client demonstrated an " understanding of the group by actively participating in the discussion and sharing information pertinent to the topic at hand, as well as offering support to peers.            Is this a Weekly Review of the Progress on the Treatment Plan?  No

## 2018-03-30 NOTE — ADDENDUM NOTE
Encounter addended by: Clifford Kennedy LP on: 3/30/2018  9:28 AM<BR>     Actions taken: Sign clinical note

## 2018-03-30 NOTE — PROGRESS NOTES
"Group Therapy Progress Notes   Treatment Goals:      Will plan and problem-solve to return to previous socia, enjoyable activities and decrease lonliness and isolation  Will plan and problem-solve to decrease fears and anxiety and increase self confidence                         Status: Active; Client will notify staff when needing assistance to develop or implement a coping plan to manage suicidal or self injurious urges.    Area of Treatment Focus:  Symptom Management, Develop / Improve Independent Living Skills and Develop Socialization / Interpersonal Relationship Skills    Therapeutic Interventions/Treatment Strategies:  Support, Feedback, Problem Solving, Clarification, Education and Cognitive Behavioral Therapy    Response to Treatment Strategies:  Accepted Feedback, Gave Feedback, Listened, Focused on Goals, Attentive and Accepted Support    Name of Group:  Psychotherapy    Progress Note  First hour: Pt reports she \"can't get over the hump of anxiety.\" She reports her anxiety is high mostly. She has fear of being alone. She is trying to have a new mindset about it. She then said her life is consumed by fear and worry. She reports \"anything\" drives her anxiety. She reports she waked up anxious. She reports she has back pain and that causes her anxiety. She reports frustration that she only sees her therapist every 10 days.     Second hour: we discussed coping strategies. We completed a breathing exercise in group and discussed distraction techniques.       Is this a Weekly Review of the Progress on the Treatment Plan?  No     "

## 2018-04-03 ENCOUNTER — HOSPITAL ENCOUNTER (OUTPATIENT)
Dept: BEHAVIORAL HEALTH | Facility: CLINIC | Age: 73
End: 2018-04-03
Attending: NURSE PRACTITIONER
Payer: MEDICARE

## 2018-04-03 PROCEDURE — H2012 BEHAV HLTH DAY TREAT, PER HR: HCPCS

## 2018-04-03 NOTE — PROGRESS NOTES
"  Adult Mental Health Outpatient Group Therapy Progress Note     Client Initial Individualized Goals for Treatment:Katerina verbalizes the following treatment/discharge goals: \"To decrease the fear of each day. Increase my self confidence again. To be able to face the day. Decrease anxiety symptoms\".      See Initial Treatment suggestions for the client during the time between Diagnostic Assessment and completion of the Master Individualized Treatment Plan.    Treatment Goals:     see above     Area of Treatment Focus:  Symptom Management and Develop Socialization / Interpersonal Relationship Skills    Therapeutic Interventions/Treatment Strategies:  Support, Feedback, Structured Activity, Clarification and Education    Response to Treatment Strategies:  Accepted Feedback, Gave Feedback, Listened, Focused on Goals, Attentive, Accepted Support and Alert    Description and Outcome    Name of Group: Group Psychotherapy  Session 1: Katerina shared with the group that she had a nice holiday week end. The concert on Friday was good and she had fun with her friends. She enjoyed watching her grandkids having an easter egg hunt. However, no sooner than she got that out, she said she is more anxious than she has ever been. She did not appear anxious, and spoke quite calmly. She talked about having an extremely difficult decision while buying a pair of shoes. She received feedback from the group that she seems to focus solely on the negative things in her life.  Session 2: The group discussed the changes taking place with the therapy staff. Particularly with the primary therapist leaving, an interim person coming in, and then the newly hired therapist starting. It was stressed that the group members would be the same and that they could continue to be stable support for one another through these changes.       Client demonstrated an understanding of the group by actively participating in the discussion and sharing information " pertinent to the topic at hand, as well as offering support to peers.        Is this a Weekly Review of the Progress on the Treatment Plan?  No

## 2018-04-03 NOTE — PROGRESS NOTES
"  Adult Mental Health Outpatient Group Therapy Progress Note     Client Initial Individualized Goals for Treatment:Katerina verbalizes the following treatment/discharge goals: \"To decrease the fear of each day. Increase my self confidence again. To be able to face the day. Decrease anxiety symptoms\".      See Initial Treatment suggestions for the client during the time between Diagnostic Assessment and completion of the Master Individualized Treatment Plan.    Treatment Goals:  . Will plan and problem-solve to return to previous socia, enjoyable activities and decrease lonliness and isolation  Will plan and problem-solve to decrease fears and anxiety and increase self confidence  Client will notify staff when needing assistance to develop or implement a coping plan to manage suicidal or self injurious urges.     Area of Treatment Focus:  Symptom Management and Develop Socialization / Interpersonal Relationship Skills    Therapeutic Interventions/Treatment Strategies:  Support, Feedback, Structured Activity, Clarification and Education    Response to Treatment Strategies:  Accepted Feedback, Gave Feedback, Listened, Focused on Goals, Attentive, Accepted Support and Alert    Name of Group:  Mental health management     Description and Outcome:  The group was provided with a guided breathing and warmup structure with focus on increasing self awareness and on providing an embodied experience.  Discussion included the importance of listening to body cues as a way of identifying emotion as well as accompanying needs and wants, and as a way of practising self compassion, authenticity, mindfulness, self expression,  and connection to other.  The group explored the \"embodiment\" of \"fierceness\" and contrasting this attitude with that of \"meekness\"  The group shared observations of their bodies expression of these attitudes.  The group also explored the body experience of being strong and vulnerable simultaneously.  Client " "demonstrated understanding of session by engaging in experiential and sharing observations.  Katerina shared that she often feels \"sola\" and \"vulnerable\" when criticized by .  She would like to practise the felt experience of being strong.    Is this a Weekly Review of the Progress on the Treatment Plan?  No        "

## 2018-04-06 ENCOUNTER — HOSPITAL ENCOUNTER (OUTPATIENT)
Dept: BEHAVIORAL HEALTH | Facility: CLINIC | Age: 73
End: 2018-04-06
Attending: NURSE PRACTITIONER
Payer: MEDICARE

## 2018-04-06 PROCEDURE — H2012 BEHAV HLTH DAY TREAT, PER HR: HCPCS

## 2018-04-06 NOTE — PROGRESS NOTES
"  Adult Mental Health Outpatient Group Therapy Progress Note     Client Initial Individualized Goals for Treatment:Katerina verbalizes the following treatment/discharge goals: \"To decrease the fear of each day. Increase my self confidence again. To be able to face the day. Decrease anxiety symptoms\".      See Initial Treatment suggestions for the client during the time between Diagnostic Assessment and completion of the Master Individualized Treatment Plan.    Treatment Goals:  . Will plan and problem-solve to return to previous socia, enjoyable activities and decrease lonliness and isolation  Will plan and problem-solve to decrease fears and anxiety and increase self confidence  Client will notify staff when needing assistance to develop or implement a coping plan to manage suicidal or self injurious urges.     Area of Treatment Focus:  Symptom Management and Develop Socialization / Interpersonal Relationship Skills    Therapeutic Interventions/Treatment Strategies:  Support, Feedback, Structured Activity, Clarification and Education    Response to Treatment Strategies:  Accepted Feedback, Gave Feedback, Listened, Focused on Goals, Attentive, Accepted Support and Alert    Name of Group:  Mental health management Hour 2:00- 2:50PM    Description and Outcome:  Facilitated a review of last weeks curriculum on the DBT concept of Mindfulness. Facilitated 10 minute mindfulness of breath exercise utilizing concepts from DBT including: sensing, describing, observing, and engaging non-judmentally and with acceptance. Review core concepts including WISE MIND, and Non Judmental Stance, and Willingness vs Willfulness.   Client demonstrated an understanding of the session content by verbalizing how she could use some of information as tools but also verbalized need for further review to be able to demonstrate further understanding.    Is this a Weekly Review of the Progress on the Treatment Plan?  No        "

## 2018-04-06 NOTE — PROGRESS NOTES
"  Adult Mental Health Outpatient Group Therapy Progress Note     Client Initial Individualized Goals for Treatment:Katerina verbalizes the following treatment/discharge goals: \"To decrease the fear of each day. Increase my self confidence again. To be able to face the day. Decrease anxiety symptoms\".      See Initial Treatment suggestions for the client during the time between Diagnostic Assessment and completion of the Master Individualized Treatment Plan.    Treatment Goals:     see above     Area of Treatment Focus:  Symptom Management and Develop Socialization / Interpersonal Relationship Skills    Therapeutic Interventions/Treatment Strategies:  Support, Feedback, Structured Activity, Clarification and Education    Response to Treatment Strategies:  Accepted Feedback, Gave Feedback, Listened, Focused on Goals, Attentive, Accepted Support and Alert    Description and Outcome    Name of Group: Group Psychotherapy  Session 1: Katerina began group stating that she has never been as anxious as she is today. She stated that her back her and she simply could not relax. She stated that she could not remember one coping skill and did not know what she was going to do. The group pointed out that she has used multiple coping skills in the past, some quite successfully. Katerina just stared rather blankly and said she could not remember. She requested that she speak with the lead therapist later if possible. She was also supplied with information for [facility] support groups for woman with anxiety, which she indicated she would follow up on.  Session 2: The group took time to welcome and assure a new member and some shared their issues around substance abuse as well, which helped her. The group took time to process the ending of this primary therapist and were also introduced to the interim therapist and given the opportunity to ask any questions of her.     Client demonstrated an understanding of the group by actively participating in " the discussion and sharing information pertinent to the topic at hand, as well as offering support to peers.          Is this a Weekly Review of the Progress on the Treatment Plan?  No

## 2018-04-06 NOTE — PROGRESS NOTES
"  Adult Mental Health Outpatient Group Therapy Progress Note       See Initial Treatment suggestions for the client during the time between Diagnostic Assessment and completion of the Master Individualized Treatment Plan.    Treatment Goals:     Will plan and problem-solve to return to previous socia, enjoyable activities and decrease lonliness and isolation  Will plan and problem-solve to decrease fears and anxiety and increase self confidence                       Status: Active  Client will notify staff when needing assistance to develop or implement a coping plan to manage suicidal or self injurious urges.       Area of Treatment Focus:  Symptom Management    Therapeutic Interventions/Treatment Strategies:  Support, Feedback, Structured Activity, Problem Solving, Clarification and Education    Response to Treatment Strategies:  Accepted Feedback, Gave Feedback, Listened, Focused on Goals, Attentive, Accepted Support and Alert    Name of Group:  Coping with Depression and Anxiety     Description and Outcome:  Client participated in a review of \"Ten Ways to Heal Anxiety Daily\" One of the techniques reviewed included writing a visualization of your life without anxiety. Client wrote how her day would look and participated in a discussion about how she felt when writing about this. Katerina said she felt more relaxed and hopeful when writing this, although countered it with negative thoughts.    Client demonstrated an understanding of the session content by fully participating, sharing and giving feedback.    Is this a Weekly Review of the Progress on the Treatment Plan?  No      "

## 2018-04-10 ENCOUNTER — HOSPITAL ENCOUNTER (OUTPATIENT)
Dept: BEHAVIORAL HEALTH | Facility: CLINIC | Age: 73
End: 2018-04-10
Attending: NURSE PRACTITIONER
Payer: MEDICARE

## 2018-04-10 PROCEDURE — H2012 BEHAV HLTH DAY TREAT, PER HR: HCPCS

## 2018-04-11 NOTE — PROGRESS NOTES
Adult Mental Health Outpatient Group Therapy Progress Note       See Initial Treatment suggestions for the client during the time between Diagnostic Assessment and completion of the Master Individualized Treatment Plan.    Treatment Goals:     Will plan and problem-solve to return to previous socia, enjoyable activities and decrease lonliness and isolation  Will plan and problem-solve to decrease fears and anxiety and increase self confidence                       Status: Active  Client will notify staff when needing assistance to develop or implement a coping plan to manage suicidal or self injurious urges.       Area of Treatment Focus:  Symptom Management    Therapeutic Interventions/Treatment Strategies:  Support, Feedback, Structured Activity, Problem Solving, Clarification and Education    Response to Treatment Strategies:  Accepted Feedback, Gave Feedback, Listened, Focused on Goals, Attentive, Accepted Support and Alert    Name of Group:  Coping with Depression and Anxiety     Description and Outcome:  Client learned about the benefits of deep relaxation and participated in a progressive muscle relaxation exercise.  Client demonstrates an understanding of the session content by fully participating in the exercise. Client reports that the exercise was helpful and that she felt more calm, although quickly negated this. Other group members commented to her that she appeared much calmer.     Is this a Weekly Review of the Progress on the Treatment Plan?  No

## 2018-04-13 NOTE — PROGRESS NOTES
Adult Mental Health Outpatient Group Therapy Progress Note   See Initial Treatment suggestions for the client during the time between Diagnostic Assessment and completion of the Master Individualized Treatment Plan.     Treatment Goals:      Will plan and problem-solve to return to previous socia, enjoyable activities and decrease lonliness and isolation  Will plan and problem-solve to decrease fears and anxiety and increase self confidence  Client will notify staff when needing assistance to develop or implement a coping plan to manage suicidal or self injurious urges.      Area of Treatment Focus:  Symptom Management, Personal Safety, Develop / Improve Independent Living Skills and Develop Socialization / Interpersonal Relationship Skills    Therapeutic Interventions/Treatment Strategies:  Support, Feedback, Safety Assessments, Structured Activity, Problem Solving, Clarification and Education    Response to Treatment Strategies:  Accepted Feedback, Gave Feedback, Listened, Focused on Goals, Attentive, Accepted Support and Alert    Name of Group:  Group therapy; [id number], [id number]  Mental health management [id number]     Description and Outcome:  Group therapy  Hour 1  Client reported high anxiety.  She reported continued back pain. She said she has an appt with a chiropractor, physical therapist, and an acupuncturist.  She said she worries nothing will work.  Group members provided support for her willingness to try different approaches to healing.  She said she is still having problems being at home because she cannot control her anxiety.  Discussed the difference between management and control and the stress that results from feeling like she needs to control everything.  In discussing her discomfort with being at home she said it bothers her because she cannot do what she used to do because of the back pain and has to sit and rest more often and she feels shame about it making being there uncomfortable.  Group  members suggested that challenging the feelings of shame may allow her to feel more comfortable being at home as there is nothing wrong with being injured.  She said she would consider it.  Client verbalized understanding of session content by clarifying the concept of management versus control.   Hour 2  Client participated in a discussion on acceptance.  Acceptance was defined as acknowledgment and nurturing the concept of  seeing reality as it is  to decrease judgment and misery associated with symptoms and stressors.  Client benefitted from the discussion relating acceptance to her experience of grief over unwanted physical changes.   Mental health management  Client participated in an assessment of personal strengths.  She considered positive attributes in the areas of social interaction, physical self-cares, intellectual pursuits, productivity, emotions management, and spirituality.  Client problem solved barriers to acknowledging strengths and shared positive personal attributes with group members.  Client benefitted from this group by problem solving and increasing self-awareness.  Is this a Weekly Review of the Progress on the Treatment Plan?  No

## 2018-04-17 ENCOUNTER — HOSPITAL ENCOUNTER (OUTPATIENT)
Dept: BEHAVIORAL HEALTH | Facility: CLINIC | Age: 73
End: 2018-04-17
Attending: NURSE PRACTITIONER
Payer: MEDICARE

## 2018-04-17 PROCEDURE — H2012 BEHAV HLTH DAY TREAT, PER HR: HCPCS

## 2018-04-18 NOTE — PROGRESS NOTES
"  Adult Mental Health Outpatient Group Therapy Progress Note     Client Initial Individualized Goals for Treatment:Katerina verbalizes the following treatment/discharge goals: \"To decrease the fear of each day. Increase my self confidence again. To be able to face the day. Decrease anxiety symptoms\".      See Initial Treatment suggestions for the client during the time between Diagnostic Assessment and completion of the Master Individualized Treatment Plan.    Treatment Goals:  . Will plan and problem-solve to return to previous socia, enjoyable activities and decrease lonliness and isolation  Will plan and problem-solve to decrease fears and anxiety and increase self confidence  Client will notify staff when needing assistance to develop or implement a coping plan to manage suicidal or self injurious urges.     Area of Treatment Focus:  Symptom Management and Develop Socialization / Interpersonal Relationship Skills    Therapeutic Interventions/Treatment Strategies:  Support, Feedback, Structured Activity, Clarification and Education    Response to Treatment Strategies:  Accepted Feedback, Gave Feedback, Listened, Focused on Goals, Attentive, Accepted Support and Alert    Name of Group:  Mental health management [id number]     Description and Outcome:  The group was provided with a guided breathing and warmup structure with focus on increasing self awareness and on providing an embodied experience.  Discussion included the importance of listening to body cues as a way of identifying emotion as well as accompanying needs and wants, and as a way of practising self compassion, authenticity, mindfulness, self expression,  and connection to other.  The group utilized structure to practise \"conflict\" noting physical sensation that arise with conflict and practising their ability to tolerate.  The group balanced this feeling exploration with that of \"happiness\".  Client demonstrated understanding of session by participating in " experiential and sharing personal observation, both emotions and sensation observations.  Katerina took risks with conflict and accepted feedback regarding ability to stand up for self.    Is this a Weekly Review of the Progress on the Treatment Plan?  No

## 2018-04-19 NOTE — PROGRESS NOTES
Adult Mental Health Outpatient Group Therapy Progress Note      See Initial Treatment suggestions for the client during the time between Diagnostic Assessment and completion of the Master Individualized Treatment Plan.      Treatment Goals:       Will plan and problem-solve to return to previous socia, enjoyable activities and decrease lonliness and isolation  Will plan and problem-solve to decrease fears and anxiety and increase self confidence  Client will notify staff when needing assistance to develop or implement a coping plan to manage suicidal or self injurious urges.     Area of Treatment Focus:  Symptom Management, Personal Safety and Develop / Improve Independent Living Skills    Therapeutic Interventions/Treatment Strategies:  Support, Feedback, Safety Assessments, Structured Activity, Problem Solving, Clarification and Education    Response to Treatment Strategies:  Accepted Feedback, Gave Feedback, Listened, Focused on Goals, Attentive, Accepted Support and Alert    Name of Group:  Group therapy [id number], [id number]  Wellness [phone]     Description and Outcome:  Group therapy  Hour 1  Katerina reported anxiety.  She said she had a poor weekend.  She said went to a weekend [employer] retreat and was so anxiety she had to have her  com and get her due to fear and crying.  She said she was disappointed because she has been going to the retreat for many years.  Writer discussed physical calming with cold temperature to manage anxiety symptoms and how to use the skill.  Problem solved barriers to skills use and client agreed to try it.  Client demonstrated understanding of session content by planning how she could keep cold pack available for managing anxiety.  Hour 2  Client reported feeing worried about the appointments she has set up to work on back pain.  Group members helped her with pros and cons of each and encouraged her to make the decision which appts she will keep.  No SI reported.  Client  verbalized understanding of session content by problem solving with group members  Wellness  Client participated in an educational topic on distress tolerance.  The purpose and intent to decrease physical and emotional reactions were presented and discussed.  Client learned skills for paced breathing and practiced with the group.  Client learned and practiced paired progressive relaxation and discussed benefits of use to calm when anxious.  Client would benefit from additional opportunities to practice and implement content from this session.      Is this a Weekly Review of the Progress on the Treatment Plan?  No

## 2018-04-20 ENCOUNTER — HOSPITAL ENCOUNTER (OUTPATIENT)
Dept: BEHAVIORAL HEALTH | Facility: CLINIC | Age: 73
End: 2018-04-20
Attending: NURSE PRACTITIONER
Payer: MEDICARE

## 2018-04-20 PROCEDURE — H2012 BEHAV HLTH DAY TREAT, PER HR: HCPCS

## 2018-04-20 NOTE — PROGRESS NOTES
"  Adult Mental Health Outpatient Group Therapy Progress Note       See Initial Treatment suggestions for the client during the time between Diagnostic Assessment and completion of the Master Individualized Treatment Plan.    Treatment Goals:     Will plan and problem-solve to return to previous socia, enjoyable activities and decrease lonliness and isolation  Will plan and problem-solve to decrease fears and anxiety and increase self confidence                       Status: Active  Client will notify staff when needing assistance to develop or implement a coping plan to manage suicidal or self injurious urges.       Area of Treatment Focus:  Symptom Management, Community Resources/Discharge Planning and Develop Socialization / Interpersonal Relationship Skills    Therapeutic Interventions/Treatment Strategies:  Support, Feedback, Structured Activity, Problem Solving, Clarification and Education    Response to Treatment Strategies:  Accepted Feedback, Gave Feedback, Listened, Focused on Goals, Attentive, Accepted Support and Alert    Name of Group:  Psychotherapy  Session 1: [id number] and Session 2: [id number]    Description and Outcome:  In session one client reports continued anxiety, feeling overwhelmed and uncertain what to do. She saw a new provider, but is indecisive about leaving her current dr. Client is being encouraged by her  to do so. Group also encouraged her to change, because of slow progress. Katerina states \"I don't know what to do. I bought two anxiety books but become overwhelmed trying to read them\". She is worrying about summer approaching because her  will be outside in the yard and she is fearful when he is not near. Group emphasized the importance of staying in the moment and she agrees, but struggles to implement.   Client demonstrates an understanding of the session by  participating, sharing and giving feedback in the discussion, but struggles to take in ideas.  In session two a " common theme of squelching feelings was identified and discussed. Client identified a pattern of denying feelings. We discussed how this contributes to continued anxiety and depression.   Client demonstrates an understanding of session content by sharing personal experience of learning to squelch feelings in childhood and a desire to change this pattern.    Is this a Weekly Review of the Progress on the Treatment Plan?  Yes.      Are Treatment Plan Goals being addressed?  Yes, continue treatment goals      Are Treatment Plan Strategies to Address Goals Effective?  Yes, continue treatment strategies      Are there any current contracts in place?  No

## 2018-04-20 NOTE — PROGRESS NOTES
"  Adult Mental Health Outpatient Group Therapy Progress Note     Client Initial Individualized Goals for Treatment:Katerina verbalizes the following treatment/discharge goals: \"To decrease the fear of each day. Increase my self confidence again. To be able to face the day. Decrease anxiety symptoms\".      See Initial Treatment suggestions for the client during the time between Diagnostic Assessment and completion of the Master Individualized Treatment Plan.    Treatment Goals:  . Will plan and problem-solve to return to previous socia, enjoyable activities and decrease lonliness and isolation  Will plan and problem-solve to decrease fears and anxiety and increase self confidence  Client will notify staff when needing assistance to develop or implement a coping plan to manage suicidal or self injurious urges.     Area of Treatment Focus:  Symptom Management and Develop Socialization / Interpersonal Relationship Skills    Therapeutic Interventions/Treatment Strategies:  Support, Feedback, Structured Activity, Clarification and Education    Response to Treatment Strategies:  Accepted Feedback, Gave Feedback, Listened, Focused on Goals, Attentive, Accepted Support and Alert    Name of Group:  Mental health management Hour     Description and Outcome:    Hour: Hour 1:00- 1:50PM- Continued with DBT, Distress Tolerance Module. Today worked on Self-Soothing by using the 5 senses. Defined self-soothing, identified obstacles to self-soothing, reviewed bringing mindfulness into self-soothing. Katerina continues to report high level of anxiety. Despite verbalizing using skills day to day she reports ongoing severe anxiety and rumination. In group she listens and is engaged and is on track with her responses. Continues to need review and encouragement to engage with skills mindfully.   Client demonstrated an understanding of the session content by verbalizing how she could use some of information as tools but also verbalized need for " further review to be able to demonstrate further understanding.    Is this a Weekly Review of the Progress on the Treatment Plan?  No

## 2018-04-24 ENCOUNTER — HOSPITAL ENCOUNTER (OUTPATIENT)
Dept: BEHAVIORAL HEALTH | Facility: CLINIC | Age: 73
End: 2018-04-24
Attending: NURSE PRACTITIONER
Payer: MEDICARE

## 2018-04-24 PROCEDURE — H2012 BEHAV HLTH DAY TREAT, PER HR: HCPCS

## 2018-04-25 NOTE — PROGRESS NOTES
"Adult Mental Health Outpatient Group Therapy Progress Note       Client Initial Individualized Goals for Treatment:Katerina verbalizes the following treatment/discharge goals: \"To decrease the fear of each day. Increase my self confidence again. To be able to face the day. Decrease anxiety symptoms\".        See Initial Treatment suggestions for the client during the time between Diagnostic Assessment and completion of the Master Individualized Treatment Plan.     Treatment Goals:   Will plan and problem-solve to return to previous socia, enjoyable activities and decrease lonliness and isolation  Will plan and problem-solve to decrease fears and anxiety and increase self confidence  Client will notify staff when needing assistance to develop or implement a coping plan to manage suicidal or self injurious urges.    Area of Treatment Focus:  Symptom Management, Personal Safety and Cultural / Spirituality    Therapeutic Interventions/Treatment Strategies:  Support, Feedback, Safety Assessments, Structured Activity, Problem Solving, Clarification and Education    Response to Treatment Strategies:  Accepted Feedback, Gave Feedback, Listened, Focused on Goals, Attentive, Accepted Support and Alert    Name of Group:  Group therapy [id number], [id number]  Mental health management [id number]       Description and Outcome:  Hour 1  Client reported continued anxiety.  She said she was able to get out over the weekend and that helped the depression.  She said she had the second opinion from the prescriber and had a medication change.  She said she is not sure what to do because she is worried it will not work.  She said she knows she is supposed to be positive but is finding that difficult.  Group members discussed perfectionism and expecting too much from herself and others may be hampering her ability to see improvement.  Discussed skills to manage negative thinking and expectations. No SI reported  Client verbalized understanding " of session content by problem solving with group members.  Hour 2  Client reported continued anxiety and said it is heightened this week due to meeting with a new prescriber.  She said she does not trust this person and is not sure she wants to stay with them.  Group members helped with pros/cons of changing prescribers and suggested the skills she need to focus on building is trusting herself.  Problem solved accepting the help and working toward setting limits on the anxious thoughts.  No SI reported.    Client would benefit from additional opportunities to practice and implement content from this session.  Mental health management  Client participated in an educational presentation and discussion on grief.  Models of grief were presented and group members discussed examples of permanent loss, change in function, and change in relationships.  Grief was described as a reaction to life changes and typical ways people manage the emotional and cognitive processes related to loss.  Healing from grief was reviewed focusing on the tasks of mourning related to grief experienced when life changes.  The client benefitted from the discussion by talking through barriers to moving energy away from the event, illness, or life circumstance that initiated the grief.    Is this a Weekly Review of the Progress on the Treatment Plan?  No

## 2018-04-25 NOTE — PROGRESS NOTES
"  Adult Mental Health Outpatient Group Therapy Progress Note     Client Initial Individualized Goals for Treatment:Katerina verbalizes the following treatment/discharge goals: \"To decrease the fear of each day. Increase my self confidence again. To be able to face the day. Decrease anxiety symptoms\".      See Initial Treatment suggestions for the client during the time between Diagnostic Assessment and completion of the Master Individualized Treatment Plan.    Treatment Goals:  . Will plan and problem-solve to return to previous socia, enjoyable activities and decrease lonliness and isolation  Will plan and problem-solve to decrease fears and anxiety and increase self confidence  Client will notify staff when needing assistance to develop or implement a coping plan to manage suicidal or self injurious urges.     Area of Treatment Focus:  Symptom Management and Develop Socialization / Interpersonal Relationship Skills    Therapeutic Interventions/Treatment Strategies:  Support, Feedback, Structured Activity and Education    Response to Treatment Strategies:  Accepted Feedback, Gave Feedback, Listened, Focused on Goals, Attentive, Accepted Support and Alert    Name of Group:  Mental health management [id number]     Description and Outcome:  The group was provided with a guided breathing and warmup structure with focus on increasing self awareness and on providing an embodied experience.  Discussion included the importance of listening to body cues as a way of identifying emotion as well as accompanying needs and wants, and as a way of practising self compassion, authenticity, mindfulness, self expression,  and connection to other.  As "[employer] music was played, the group began to dance, leading to a discussion about what it means to \"join\" the dance, invite others to join or be invited.  The group discussed joining the dance was similar to joining their lives (or being more engaged).  Client demonstrated understanding of " "session by \"joining the dance\" , being engaged and sharing observations.   Katerina related the concept of joining the dance with being more engaged in her life.    Is this a Weekly Review of the Progress on the Treatment Plan?  No        "

## 2018-04-27 ENCOUNTER — HOSPITAL ENCOUNTER (OUTPATIENT)
Dept: BEHAVIORAL HEALTH | Facility: CLINIC | Age: 73
End: 2018-04-27
Attending: NURSE PRACTITIONER
Payer: MEDICARE

## 2018-04-27 PROCEDURE — H2012 BEHAV HLTH DAY TREAT, PER HR: HCPCS

## 2018-04-27 NOTE — PROGRESS NOTES
"  Adult Mental Health Outpatient Group Therapy Progress Note     Client Initial Individualized Goals for Treatment:Katerina verbalizes the following treatment/discharge goals: \"To decrease the fear of each day. Increase my self confidence again. To be able to face the day. Decrease anxiety symptoms\".      See Initial Treatment suggestions for the client during the time between Diagnostic Assessment and completion of the Master Individualized Treatment Plan.    Treatment Goals:  . Will plan and problem-solve to return to previous socia, enjoyable activities and decrease lonliness and isolation  Will plan and problem-solve to decrease fears and anxiety and increase self confidence  Client will notify staff when needing assistance to develop or implement a coping plan to manage suicidal or self injurious urges.     Area of Treatment Focus:  Symptom Management and Develop Socialization / Interpersonal Relationship Skills    Therapeutic Interventions/Treatment Strategies:  Support, Feedback, Structured Activity, Clarification and Education    Response to Treatment Strategies:  Accepted Feedback, Gave Feedback, Listened, Focused on Goals, Attentive, Accepted Support and Alert    Name of Group:  Coping with Depression      Description and Outcome:    Hour: Hour 1:00- 1:50PM- Hour:   Facilitated DBT concept on Dialectics. Reviewed how to work with concept and defined concept. All participated. Verbalizes understanding but returns to same themes of anxiety. Needs further review and support. Continues to endorse high anxiety despite using skills.      Client demonstrated an understanding of the session content by verbalizing how she could use some of information as tools but also verbalized need for further review to be able to demonstrate further understanding.    Is this a Weekly Review of the Progress on the Treatment Plan?  No        "

## 2018-04-27 NOTE — PROGRESS NOTES
"  Adult Mental Health Outpatient Group Therapy Progress Note   Client Initial Individualized Goals for Treatment:Katerina verbalizes the following treatment/discharge goals: \"To decrease the fear of each day. Increase my self confidence again. To be able to face the day. Decrease anxiety symptoms\".          See Initial Treatment suggestions for the client during the time between Diagnostic Assessment and completion of the Master Individualized Treatment Plan.      Treatment Goals:   Will plan and problem-solve to return to previous socia, enjoyable activities and decrease lonliness and isolation  Will plan and problem-solve to decrease fears and anxiety and increase self confidence  Client will notify staff when needing assistance to develop or implement a coping plan to manage suicidal or self injurious urges.  Area of Treatment Focus:  Symptom Management, Personal Safety and Cultural / Spirituality    Therapeutic Interventions/Treatment Strategies:  Support, Feedback, Safety Assessments, Structured Activity, Problem Solving, Clarification, Education, Motivational Enhancement Therapy and Cognitive Behavioral Therapy    Response to Treatment Strategies:  Accepted Feedback, Gave Feedback, Listened, Focused on Goals, Attentive, Accepted Support and Alert    Name of Group:  Mental Health Management     Description and Outcome:  Katerina participated in the group on \"Dysfunctional Beliefs\".  She states that several of these beliefs \"hit home\" for her as she struggles with anxiety and her anxiety fuels these beliefs and vice versa.  She identified ways that she can use mindfulness to catch these beliefs as they occur and combat them.      Is this a Weekly Review of the Progress on the Treatment Plan?  No     SAHARA Gauthier            "

## 2018-05-01 ENCOUNTER — HOSPITAL ENCOUNTER (OUTPATIENT)
Dept: BEHAVIORAL HEALTH | Facility: CLINIC | Age: 73
End: 2018-05-01
Attending: NURSE PRACTITIONER
Payer: MEDICARE

## 2018-05-01 PROCEDURE — H2012 BEHAV HLTH DAY TREAT, PER HR: HCPCS

## 2018-05-01 NOTE — PROGRESS NOTES
"Adult Mental Health Outpatient Group Therapy Progress Note   Client Initial Individualized Goals for Treatment:Katerina verbalizes the following treatment/discharge goals: \"To decrease the fear of each day. Increase my self confidence again. To be able to face the day. Decrease anxiety symptoms\".        See Initial Treatment suggestions for the client during the time between Diagnostic Assessment and completion of the Master Individualized Treatment Plan.     Treatment Goals:  Will plan and problem-solve to return to previous social, enjoyable activities and decrease lonliness and isolation  Will plan and problem-solve to decrease fears and anxiety and increase self confidence  Client will notify staff when needing assistance to develop or implement a coping plan to manage suicidal or self injurious urges.      Area of Treatment Focus:  Symptom Management and Personal Safety    Therapeutic Interventions/Treatment Strategies:  Support, Feedback, Safety Assessments and Problem Solving    Response to Treatment Strategies:  Accepted Feedback, Gave Feedback, Listened, Focused on Goals, Attentive, Accepted Support and Alert    Name of Group:  Group therapy [id number], [id number]     Description and Outcome:  Hour 1  Client reported anxious mood.  She continues to ruminate about her day and fears she will be too anxious to get anything done.  Writer suggested that she break down her experience to manage individual thoughts or sensations as the label of \"anxiety\" can be overwhelming.  She said she is not sure what she experiences.  Writer asked her in the next few days to spend some time noticing her experience and come back to discuss with group for support and problem solving.  She agreed.  Client would benefit from additional opportunities to practice and implement content from this session.  Hour 2  Client reported she is concerned about her decision to switch prescribers and she kept her appt with her other prescriber but " now worries that this person will fire her.  Group members discussed the pros/cons of the decision and cancelling the appt with the previous therapist.  Group members stressed it is her decision and she has to weigh her needs with the recommendations of family members.  No SI reported  Client demonstrated understanding of session content by listening and accepting feedback from group members.    Is this a Weekly Review of the Progress on the Treatment Plan?  Yes.      Are Treatment Plan Goals being addressed?  Yes, continue treatment goals      Are Treatment Plan Strategies to Address Goals Effective?  Yes, continue treatment strategies      Are there any current contracts in place?  No

## 2018-05-02 NOTE — PROGRESS NOTES
"Adult Mental Health Outpatient Group Therapy Progress Note   Group Therapy Progress Note   Client Initial Individualized Goals for Treatment:Katerina verbalizes the following treatment/discharge goals: \"To decrease the fear of each day. Increase my self confidence again. To be able to face the day. Decrease anxiety symptoms\".          See Initial Treatment suggestions for the client during the time between Diagnostic Assessment and completion of the Master Individualized Treatment Plan.      Treatment Goals:  Will plan and problem-solve to return to previous social, enjoyable activities and decrease lonliness and isolation  Will plan and problem-solve to decrease fears and anxiety and increase self confidence  Client will notify staff when needing assistance to develop or implement a coping plan to manage suicidal or self injurious urges.         Area of Treatment Focus:  Symptom Management and Personal Safety    Therapeutic Interventions/Treatment Strategies:  Support, Feedback, Safety Assessments and Problem Solving    Response to Treatment Strategies:  Accepted Feedback, Gave Feedback, Listened, Focused on Goals, Attentive, Accepted Support and Alert    Name of Group:  Group therapy [id number], [id number]     Description and Outcome:  Hour 1  Client reported anxious mood.  She said she has tried the paxil the new prescriber gave her for over a week and she continues to have tremors.  She said she is not sure she wants to continue.  Group asked how long she was asked to try and she said 2 weeks.  Group members helped client pro/con continuing to see if the side effects ease.  No SI reported. Client verbalized understanding of session content by working with group members to work on decision making .     Hour 2  Client said she wants to be more independent as she believes she depends too much on her .  She said she has no confidence and wants to get some so she can be independent.  Group discussed building " confidence by doing because confidence is not just something you get independently of action.  Group members problem solved to find activities to try.  No SI reported Client would benefit from additional opportunities to practice and implement content from this session.      Is this a Weekly Review of the Progress on the Treatment Plan?  No

## 2018-05-02 NOTE — PROGRESS NOTES
"  Adult Mental Health Outpatient Group Therapy Progress Note     Client Initial Individualized Goals for Treatment:Katerina verbalizes the following treatment/discharge goals: \"To decrease the fear of each day. Increase my self confidence again. To be able to face the day. Decrease anxiety symptoms\".      See Initial Treatment suggestions for the client during the time between Diagnostic Assessment and completion of the Master Individualized Treatment Plan.    Treatment Goals:  . Will plan and problem-solve to return to previous socia, enjoyable activities and decrease lonliness and isolation  Will plan and problem-solve to decrease fears and anxiety and increase self confidence  Client will notify staff when needing assistance to develop or implement a coping plan to manage suicidal or self injurious urges.       Area of Treatment Focus:  Symptom Management and Develop Socialization / Interpersonal Relationship Skills    Therapeutic Interventions/Treatment Strategies:  Support, Feedback, Structured Activity, Clarification and Education    Response to Treatment Strategies:  Accepted Feedback, Gave Feedback, Listened, Focused on Goals, Attentive, Accepted Support and Alert    Name of Group:  Mental health management [id number]     Description and Outcome:  The group was provided with a guided breathing and warmup structure with focus on increasing self awareness and on providing an embodied experience.  Discussion included the importance of listening to body cues as a way of identifying emotion as well as accompanying needs and wants, and as a way of practising self compassion, authenticity, mindfulness, self expression,  and connection to other.  The group was provided education regarding Bainbridge Cohen's satisfaction cycle (yield, push, reach, grasp, and pull) to explore what it is they reach for and what disrupts the cycle.  Client demonstrated understanding of session by engaging in experiential and offering " observations.  Katerina checked in stating that she needed to catch her breath.  She found that she was able to do so when thinking of granddaughter.    Is this a Weekly Review of the Progress on the Treatment Plan?  No

## 2018-05-04 ENCOUNTER — HOSPITAL ENCOUNTER (OUTPATIENT)
Dept: BEHAVIORAL HEALTH | Facility: CLINIC | Age: 73
End: 2018-05-04
Attending: NURSE PRACTITIONER
Payer: MEDICARE

## 2018-05-04 PROCEDURE — H2012 BEHAV HLTH DAY TREAT, PER HR: HCPCS

## 2018-05-07 NOTE — PROGRESS NOTES
"Adult Mental Health Outpatient Group Therapy Progress Note   Client Initial Individualized Goals for Treatment:Katerina verbalizes the following treatment/discharge goals: \"To decrease the fear of each day. Increase my self confidence again. To be able to face the day. Decrease anxiety symptoms\".          See Initial Treatment suggestions for the client during the time between Diagnostic Assessment and completion of the Master Individualized Treatment Plan.      Treatment Goals:  Will plan and problem-solve to return to previous social, enjoyable activities and decrease lonliness and isolation  Will plan and problem-solve to decrease fears and anxiety and increase self confidence  Client will notify staff when needing assistance to develop or implement a coping plan to manage suicidal or self injurious urges.     Area of Treatment Focus:  Symptom Management, Personal Safety and Develop / Improve Independent Living Skills    Therapeutic Interventions/Treatment Strategies:  Support, Feedback, Safety Assessments, Structured Activity, Problem Solving and Education    Response to Treatment Strategies:  Accepted Feedback, Gave Feedback, Listened, Focused on Goals, Attentive, Accepted Support, Disengaged and Alert    Name of Group:  Group therapy [id number], [id number], Mental health management [id number]     Description and Outcome:  Hour 1  Client reported anxious mood.  She reported she had a talk with her daughter who told her she needs to change her mindset.  She said her daughter told her she needs to focus on being happy.  Discussed acceptance of the moment to decrease the focus on her anxiety.  She saw her prescriber and is increasing her paxil.  Group members encouraged her to try the medication.  Client verbalized understanding of session content by accepting feedback from others.  Hour 2  Client reported efforts to engage in community supports.  She said she and her  are going to do some renovations at her home " and she is worried about decision making and people coming to her house.  Discussed perspective and the opportunity that lies in making decisions.  Group members asked the client to think about what she wants rather than her anxiety telling her the decision is hard.  Client would benefit from additional opportunities to practice and implement content from this session.    Mental health management  Client participated in an exercise designed to clarify values. Client completed a worksheet and discussed the answers with group members.  The group discussed values important to them and distinguishing if those values are life enhancing.  Client reported being able to see some patterns and although some of the values are important she can see ways to change that would be a more effective in managing depression and anxiety.  Client would benefit from additional opportunities to practice and implement content from this session.     Is this a Weekly Review of the Progress on the Treatment Plan?  Yes.      Are Treatment Plan Goals being addressed?  Yes, continue treatment goals      Are Treatment Plan Strategies to Address Goals Effective?  Yes, continue treatment strategies      Are there any current contracts in place?  No

## 2018-05-08 ENCOUNTER — HOSPITAL ENCOUNTER (OUTPATIENT)
Dept: BEHAVIORAL HEALTH | Facility: CLINIC | Age: 73
End: 2018-05-08
Attending: NURSE PRACTITIONER
Payer: MEDICARE

## 2018-05-08 PROCEDURE — H2012 BEHAV HLTH DAY TREAT, PER HR: HCPCS

## 2018-05-08 NOTE — PROGRESS NOTES
"  Adult Mental Health Outpatient Group Therapy Progress Note     Client Initial Individualized Goals for Treatment:Katerina verbalizes the following treatment/discharge goals: \"To decrease the fear of each day. Increase my self confidence again. To be able to face the day. Decrease anxiety symptoms\".      See Initial Treatment suggestions for the client during the time between Diagnostic Assessment and completion of the Master Individualized Treatment Plan.    Treatment Goals:  . Will plan and problem-solve to return to previous socia, enjoyable activities and decrease lonliness and isolation  Will plan and problem-solve to decrease fears and anxiety and increase self confidence  Client will notify staff when needing assistance to develop or implement a coping plan to manage suicidal or self injurious urges.       Area of Treatment Focus:  Symptom Management and Develop Socialization / Interpersonal Relationship Skills    Therapeutic Interventions/Treatment Strategies:  Support, Feedback, Structured Activity, Clarification and Education    Response to Treatment Strategies:  Accepted Feedback, Gave Feedback, Listened, Focused on Goals, Attentive, Accepted Support and Alert    Name of Group:  Mental health management [id number]     Description and Outcome:  The group was provided with a guided breathing and warmup structure with focus on increasing self awareness and on providing an embodied experience.  Discussion included the importance of listening to body cues as a way of identifying emotion as well as accompanying needs and wants, and as a way of practising self compassion, authenticity, mindfulness, self expression,  and connection to other.  The used image of finding way through unexpected fog to find a clearing to explore/embody their own experience of confusion with mental illness and taking control to find some clarity.  Client demonstrated understanding of session by engaging in the experiential.   Katerina asked " for group's feedback in how to take control of illness.  She accepted feedback about breathing and acknowledging the illness as two ways to begin to take control.    Is this a Weekly Review of the Progress on the Treatment Plan?  No

## 2018-05-09 NOTE — PROGRESS NOTES
"Adult Mental Health Outpatient Group Therapy Progress Note   Client Initial Individualized Goals for Treatment:Katerina verbalizes the following treatment/discharge goals: \"To decrease the fear of each day. Increase my self confidence again. To be able to face the day. Decrease anxiety symptoms\".        See Initial Treatment suggestions for the client during the time between Diagnostic Assessment and completion of the Master Individualized Treatment Plan.     Treatment Goals:  . Will plan and problem-solve to return to previous socia, enjoyable activities and decrease lonliness and isolation  Will plan and problem-solve to decrease fears and anxiety and increase self confidence  Client will notify staff when needing assistance to develop or implement a coping plan to manage suicidal or self injurious urges.      Area of Treatment Focus:  Symptom Management, Personal Safety and Develop Socialization / Interpersonal Relationship Skills    Therapeutic Interventions/Treatment Strategies:  Support, Feedback, Limit/Boundaries, Problem Solving and Education    Response to Treatment Strategies:  Accepted Feedback, Gave Feedback, Listened, Focused on Goals, Attentive, Accepted Support and Alert    Name of Group:  Group therapy [id number], [id number]  Mental health management [id number]     Description and Outcome:  Hour 1  Client reported having a better weekend.  She said she got out for yard work and broke the task into shifts so she would help her back.  Group members provided support for her effort at being flexible.  She said she is still ruminating about the choices she has to make about the house remodeling.  Group members helped problem solve accepting change.  No SI reported.  Client demonstrated understanding of session content by problem solving with others.  Hour 2  Client reported being upset that she continues to be anxious.  She said she said being positive is not working for her.  Writer suggested client may need " to stop making the anxiety go away to be less anxious.  Client said that does not seem right to her.  Writer explained that her anxiety about being anxious is suffering.  Writer suggested that accepting her anxiety allows her to focus on what skills she can use when she is anxious to decrease the distress of being anxious which will in turn reduce her anxiety.  Group members provided support and encouragement for skills use.Client would benefit from additional opportunities to practice and implement content from this session.    Mental health management  Client participated in a discussion on values.  Information was presented regarding values development, personal values and public values.  Discussed the adaptability of values and how they are working for the client. Client said she is able to see how she needs to be more adaptable to manage her anxiety. Client benefitted from this group by problem solving and increasing self-awareness.  Is this a Weekly Review of the Progress on the Treatment Plan?  No

## 2018-05-11 ENCOUNTER — HOSPITAL ENCOUNTER (OUTPATIENT)
Dept: BEHAVIORAL HEALTH | Facility: CLINIC | Age: 73
End: 2018-05-11
Attending: NURSE PRACTITIONER
Payer: MEDICARE

## 2018-05-11 PROCEDURE — H2012 BEHAV HLTH DAY TREAT, PER HR: HCPCS

## 2018-05-11 NOTE — PROGRESS NOTES
Group Therapy Progress Notes     Area of Treatment Focus:  Community Resources/Discharge Planning, Abstinence/Relapse Prevention and Develop / Improve Independent Living Skills    Therapeutic Interventions/Treatment Strategies:  Support, Feedback and Problem Solving    Response to Treatment Strategies:  Accepted Feedback, Listened, Focused on Goals and Alert    Name of Group:  Mental Health Management     Progress Note  In M Group, Katerina participated in a discussion of attitudes about asking for help. Katerina reports that she thinks that she has already asked and received a lot of help from family and needs to do more on her own, that they are getting burned out. She identified needing help when she has to be alone. Looked at possibility of getting assistance from a community resource to either provide a /aid or someone who could help her structure her time better when she is alone.          Is this a Weekly Review of the Progress on the Treatment Plan?  No

## 2018-05-11 NOTE — PROGRESS NOTES
"  Adult Mental Health Outpatient Group Therapy Progress Note     Client Initial Individualized Goals for Treatment:Katerina verbalizes the following treatment/discharge goals: \"To decrease the fear of each day. Increase my self confidence again. To be able to face the day. Decrease anxiety symptoms\".      See Initial Treatment suggestions for the client during the time between Diagnostic Assessment and completion of the Master Individualized Treatment Plan.    Treatment Goals:  . Will plan and problem-solve to return to previous socia, enjoyable activities and decrease lonliness and isolation  Will plan and problem-solve to decrease fears and anxiety and increase self confidence  Client will notify staff when needing assistance to develop or implement a coping plan to manage suicidal or self injurious urges.     Area of Treatment Focus:  Symptom Management and Develop Socialization / Interpersonal Relationship Skills    Therapeutic Interventions/Treatment Strategies:  Support, Feedback, Structured Activity, Clarification and Education    Response to Treatment Strategies:  Accepted Feedback, Gave Feedback, Listened, Focused on Goals, Attentive, Accepted Support and Alert    Name of Group: Mental health management  Hour 2:00- 2:50PM      Description and Outcome:  Facilitated discharge planning group with worksheet for clients to complete in order to assist them to identify areas of aftercare need. Katerina very fearful and reacted with resistance to this discussion. Writer affirmed her fear and also pointed out her behavior of languaging her experience in very negative ways. Encouraged her to work on self affirmation and take a risk and check back with the group. She was overwhelmed but encouraged her to perhaps start with attending [facility] meeting.    Client verbalized understanding of session content resistant to topic needs much encouragement and review.    Is this a Weekly Review of the Progress on the Treatment " Plan?  No

## 2018-05-14 NOTE — PROGRESS NOTES
"Adult Mental Health Outpatient Group Therapy Progress Note      Client Initial Individualized Goals for Treatment:Katerina verbalizes the following treatment/discharge goals: \"To decrease the fear of each day. Increase my self confidence again. To be able to face the day. Decrease anxiety symptoms\".        See Initial Treatment suggestions for the client during the time between Diagnostic Assessment and completion of the Master Individualized Treatment Plan.     Treatment Goals:   Will plan and problem-solve to return to previous socia, enjoyable activities and decrease lonliness and isolation  Will plan and problem-solve to decrease fears and anxiety and increase self confidence  Client will notify staff when needing assistance to develop or implement a coping plan to manage suicidal or self injurious urges.         Area of Treatment Focus:  Symptom Management and Personal Safety    Therapeutic Interventions/Treatment Strategies:  Support, Feedback, Safety Assessments and Problem Solving    Response to Treatment Strategies:  Accepted Feedback, Gave Feedback, Listened, Focused on Goals, Attentive, Accepted Support and Alert    Name of Group:  Group therapy [id number], [id number]     Description and Outcome:  Hour 1  Client reported anxious mood.  She said she is working on task completion.  She said she has a hard time figuring out what she should do.  Discussed her value of productivity and problem solved ways to be more flexible to increase self-cares and follow through on goals.  Client demonstrated understanding of session content by accepting feedback and listening to others.  Hour 2  Client reported she has a hard time relaxing.  She said she has a \"should\" in her head about what activities she can do during the day and she must be doing work for her day to matter and her to feel good.  Group members suggested that imbalance of self-care, leisure, and work is causing her problems and made suggestions for small ways " to add in leisure that would be more conducive to managing her anxiety.    Client verbalized understanding of session content by problem solving.  Is this a Weekly Review of the Progress on the Treatment Plan?  Yes.      Are Treatment Plan Goals being addressed?  Yes, continue treatment goals      Are Treatment Plan Strategies to Address Goals Effective?  Yes, continue treatment strategies      Are there any current contracts in place?  No

## 2018-05-15 ENCOUNTER — HOSPITAL ENCOUNTER (OUTPATIENT)
Dept: BEHAVIORAL HEALTH | Facility: CLINIC | Age: 73
End: 2018-05-15
Attending: NURSE PRACTITIONER
Payer: MEDICARE

## 2018-05-15 PROCEDURE — H2012 BEHAV HLTH DAY TREAT, PER HR: HCPCS

## 2018-05-15 NOTE — PROGRESS NOTES
"  Adult Mental Health Outpatient Group Therapy Progress Note     Client Initial Individualized Goals for Treatment:Katerina verbalizes the following treatment/discharge goals: \"To decrease the fear of each day. Increase my self confidence again. To be able to face the day. Decrease anxiety symptoms\".      See Initial Treatment suggestions for the client during the time between Diagnostic Assessment and completion of the Master Individualized Treatment Plan.    Treatment Goals:  . Will plan and problem-solve to return to previous socia, enjoyable activities and decrease lonliness and isolation  Will plan and problem-solve to decrease fears and anxiety and increase self confidence  Client will notify staff when needing assistance to develop or implement a coping plan to manage suicidal or self injurious urges.       Area of Treatment Focus:  Symptom Management and Develop Socialization / Interpersonal Relationship Skills    Therapeutic Interventions/Treatment Strategies:  Support, Feedback, Structured Activity and Clarification    Response to Treatment Strategies:  Accepted Feedback, Gave Feedback, Listened, Focused on Goals, Attentive, Accepted Support and Alert    Name of Group:  Mental health management [id number]     Description and Outcome:  The group was provided with a guided breathing and warmup structure with focus on increasing self awareness and on providing an embodied experience.  Discussion included the importance of listening to body cues as a way of identifying emotion as well as accompanying needs and wants, and as a way of practising self compassion, authenticity, mindfulness, self expression,  and connection to other.  The group explored \"stirring or agitating the pot\" in order to create movement or change.  They were encouraged to be intentional, whether stirring slowly quickly, etc.  Client demonstrated understanding of session by participating in experiential and sharing personal experience. Katerina " spoke of needing confidence to take steps toward change.  She practised grounding.    Is this a Weekly Review of the Progress on the Treatment Plan?  No

## 2018-05-18 ENCOUNTER — HOSPITAL ENCOUNTER (OUTPATIENT)
Dept: BEHAVIORAL HEALTH | Facility: CLINIC | Age: 73
End: 2018-05-18
Attending: NURSE PRACTITIONER
Payer: MEDICARE

## 2018-05-18 PROCEDURE — H2012 BEHAV HLTH DAY TREAT, PER HR: HCPCS

## 2018-05-18 NOTE — PROGRESS NOTES
"  Adult Mental Health Outpatient Group Therapy Progress Note     Client Initial Individualized Goals for Treatment:Katerina verbalizes the following treatment/discharge goals: \"To decrease the fear of each day. Increase my self confidence again. To be able to face the day. Decrease anxiety symptoms\".      See Initial Treatment suggestions for the client during the time between Diagnostic Assessment and completion of the Master Individualized Treatment Plan.    Treatment Goals:  . Will plan and problem-solve to return to previous socia, enjoyable activities and decrease lonliness and isolation  Will plan and problem-solve to decrease fears and anxiety and increase self confidence  Client will notify staff when needing assistance to develop or implement a coping plan to manage suicidal or self injurious urges.       Area of Treatment Focus:  Symptom Management and Develop Socialization / Interpersonal Relationship Skills    Therapeutic Interventions/Treatment Strategies:  Support, Feedback, Structured Activity and Clarification    Response to Treatment Strategies:  Accepted Feedback, Gave Feedback, Listened, Focused on Goals, Attentive, Accepted Support and Alert    Name of Group:  Mental health management 200-250     Description and Outcome:  Hour 1:00- 1:50PM- Facilitated mindful movement class. Discussed value of working with breath gently movement as a way of toning the nervous system and calming anxiety and improving mood.   Client demonstrated understanding of session content by reporting she felt more energized and positive, Used more positive language about herself and smiled and more spontaneous.     Is this a Weekly Review of the Progress on the Treatment Plan?  No        "

## 2018-05-18 NOTE — PROGRESS NOTES
"  Adult Mental Health Outpatient Group Therapy Progress Note       See Initial Treatment suggestions for the client during the time between Diagnostic Assessment and completion of the Master Individualized Treatment Plan.    Treatment Goals:     Will plan and problem-solve to return to previous socia, enjoyable activities and decrease lonliness and isolation  Will plan and problem-solve to decrease fears and anxiety and increase self confidence                       Status: Active  Client will notify staff when needing assistance to develop or implement a coping plan to manage suicidal or self injurious urges.       Area of Treatment Focus:  Symptom Management    Therapeutic Interventions/Treatment Strategies:  Support, Feedback, Structured Activity, Problem Solving, Clarification and Education    Response to Treatment Strategies:  Accepted Feedback, Gave Feedback, Listened, Focused on Goals, Attentive, Accepted Support and Alert    Name of Group:  Coping with Depression [id number]     Description and Outcome:  Client learned about the brain's \"negativity bias\" and strategies to deliberately increase positive thinking, thus decreasing depression.  Client demonstrated an understanding of the session content by fully participating, sharing and giving feedback. Client participated in an exercise focused on identifying recent positives in her life. Katerina appeared less anxious today.    Is this a Weekly Review of the Progress on the Treatment Plan?  No    "

## 2018-05-18 NOTE — PROGRESS NOTES
"Adult Mental Health Outpatient Group Therapy Progress Note        Client Initial Individualized Goals for Treatment:Katerina verbalizes the following treatment/discharge goals: \"To decrease the fear of each day. Increase my self confidence again. To be able to face the day. Decrease anxiety symptoms\".        See Initial Treatment suggestions for the client during the time between Diagnostic Assessment and completion of the Master Individualized Treatment Plan.     Treatment Goals:  . Will plan and problem-solve to return to previous socia, enjoyable activities and decrease lonliness and isolation  Will plan and problem-solve to decrease fears and anxiety and increase self confidence  Client will notify staff when needing assistance to develop or implement a coping plan to manage suicidal or self injurious urges.        Area of Treatment Focus:  Symptom Management and Personal Safety    Therapeutic Interventions/Treatment Strategies:  Support, Feedback, Safety Assessments and Problem Solving    Response to Treatment Strategies:  Accepted Feedback, Gave Feedback, Listened, Focused on Goals, Attentive, Accepted Support and Alert    Name of Group:  Group therapy [id number], [id number]     Description and Outcome:  Hour 1  Client reported she had a good mother's day.  She said she worked outside and was more flexible to take time to relax.  She said she is working on changing her mindset and is reframing by saying \"I can\" in response to change.  Group members provided encouragement.  Client would benefit from additional opportunities to practice and implement content from this session.  Hour 2  Client reported work on d/c plans.  She said she does not like uncertainty because she feels responsibility toward her tasks and goals and changing her routine feels irresponsible.  Discussed flexibility and the role it plays in being responsible as it allows her to do what is needed instead of what she thinks is supposed to happen " which may not always be helpful.  Group members provided encouragement for flexibility  Client would benefit from additional opportunities to practice and implement content from this session.      Is this a Weekly Review of the Progress on the Treatment Plan?  No

## 2018-05-21 NOTE — PROGRESS NOTES
"Adult Mental Health Outpatient Group Therapy Progress Note   Client Initial Individualized Goals for Treatment:Katerina verbalizes the following treatment/discharge goals: \"To decrease the fear of each day. Increase my self confidence again. To be able to face the day. Decrease anxiety symptoms\".          See Initial Treatment suggestions for the client during the time between Diagnostic Assessment and completion of the Master Individualized Treatment Plan.      Treatment Goals:  Will plan and problem-solve to return to previous socia, enjoyable activities and decrease lonliness and isolation  Will plan and problem-solve to decrease fears and anxiety and increase self confidence  Client will notify staff when needing assistance to develop or implement a coping plan to manage suicidal or self injurious urges.     Area of Treatment Focus:  Symptom Management and Personal Safety    Therapeutic Interventions/Treatment Strategies:  Support, Feedback, Safety Assessments and Problem Solving    Response to Treatment Strategies:  Accepted Feedback, Gave Feedback, Listened, Focused on Goals, Attentive, Accepted Support and Alert    Name of Group:  Group therapy [id number], [id number]     Description and Outcome:  Hour 1  Client reported feeling anxious.  She said she has been more active and her daughter said she has noticed a change for her.  She said she went to [employer] by herself and made it through.  Group members acknowledged how hard that was and provided support.  She said she saw her prescriber again and she said she will stay with her.  She worries about hurting the feelings of her previous MD.  Group members provided encouragement to cancel the appointment with her MD.  She agreed. Client demonstrated understanding of session content by accepting support from others.  Hour 2  Client reported she is afraid of discharge.  She said she knows she needs to but fears not having enough support.  Discussed engaging in the " supports while in the program so the transition goes more smoothly.  Discussed setting a date and client said she is too fearful.  Writer offered to set the date and client initially was hesitant but then said that would be fine.  Writer set the d/c date of 06.22.  Discussed getting to a [facility] group before leaving the program.  Client would benefit from additional opportunities to practice and implement content from this session.      Is this a Weekly Review of the Progress on the Treatment Plan?  Yes.      Are Treatment Plan Goals being addressed?  Yes, continue treatment goals      Are Treatment Plan Strategies to Address Goals Effective?  Yes, continue treatment strategies      Are there any current contracts in place?  No

## 2018-05-22 ENCOUNTER — HOSPITAL ENCOUNTER (OUTPATIENT)
Dept: BEHAVIORAL HEALTH | Facility: CLINIC | Age: 73
End: 2018-05-22
Attending: NURSE PRACTITIONER
Payer: MEDICARE

## 2018-05-22 PROCEDURE — H2012 BEHAV HLTH DAY TREAT, PER HR: HCPCS

## 2018-05-22 NOTE — PROGRESS NOTES
"  Adult Mental Health Outpatient Group Therapy Progress Note     Client Initial Individualized Goals for Treatment:Katerina verbalizes the following treatment/discharge goals: \"To decrease the fear of each day. Increase my self confidence again. To be able to face the day. Decrease anxiety symptoms\".      See Initial Treatment suggestions for the client during the time between Diagnostic Assessment and completion of the Master Individualized Treatment Plan.    Treatment Goals:  . Will plan and problem-solve to return to previous socia, enjoyable activities and decrease lonliness and isolation  Will plan and problem-solve to decrease fears and anxiety and increase self confidence  Client will notify staff when needing assistance to develop or implement a coping plan to manage suicidal or self injurious urges.       Area of Treatment Focus:  Symptom Management and Develop Socialization / Interpersonal Relationship Skills    Therapeutic Interventions/Treatment Strategies:  Support, Feedback, Structured Activity and Clarification    Response to Treatment Strategies:  Accepted Feedback, Gave Feedback, Listened, Focused on Goals, Attentive, Accepted Support and Alert    Name of Group:  Mental health management 200-250     Description and Outcome:  The group was provided with a guided breathing and warmup structure with focus on increasing self awareness and on providing an embodied experience.  Discussion included the importance of listening to body cues as a way of identifying emotion as well as accompanying needs and wants, and as a way of practising self compassion, authenticity, mindfulness, self expression,  and connection to other.  The group focused on self care, being gentle, kind and caring with themselves using their breath and exploring calming movement.  Client demonstrated understanding of session by engaging in experiential and sharing experience.  Katerina shared that it felt good to move slowly today.    Is this " a Weekly Review of the Progress on the Treatment Plan?  No

## 2018-05-22 NOTE — PROGRESS NOTES
Group Therapy Progress Notes     See Initial Treatment suggestions for the client during the time between Diagnostic Assessment and completion of the Master Individualized Treatment Plan.     Treatment Goals:     Will plan and problem-solve to return to previous socia, enjoyable activities and decrease lonliness and isolation  Will plan and problem-solve to decrease fears and anxiety and increase self confidence  Client will notify staff when needing assistance to develop or implement a coping plan to manage suicidal or self injurious urges.      Area of Treatment Focus:  Symptom Management, Personal Safety, Community Resources/Discharge Planning, Abstinence/Relapse Prevention, Develop / Improve Independent Living Skills and Develop Socialization / Interpersonal Relationship Skills    Therapeutic Interventions/Treatment Strategies:  Support, Safety Assessments, Structured Activity and Education    Response to Treatment Strategies:  Accepted Feedback, Listened, Focused on Goals, Attentive and Accepted Support      Name of Group:  Psychotherapy Session1; Psychotherapy Session 2    Psychotherapy Group Description  Provides a therapeutic environment for clients to explore group dynamics including identifying current feelings and problem areas.  Emotional and rational cognitive interactions between individual persons in the group are facilitated and observed.  Personal dynamics of any individual patient may be discussed within the group setting.  Processes that help patients move toward emotional healing and modification of thought and behavior are used, such as facilitating improved interpersonal exchanges, group support, and reminiscing. The focus of the group is to enhance the client s problem solving skills as well as help clients learn more about themselves and how they relate to others. Group therapy provides opportunity to explore and practice direct expressions of needs, thoughts, and feelings that guide  behaviors to promote mental/emotional stability and improved interpersonal relationships.    Outcome:  In Psychotherapy Session 1, Katerina reports anxious mood. Denies S/I or safety issues. About a month ago, she began seeing a new medication management provider, Radha Stubbs at [facility] who added Paxil 30 mg. Katerina believes that this medication is helping her to feel happier. She will see a new therapist this Thursday. She had a nice weekend with family over the weekend. She went for a walk with her son who took her out for Mother's Day. Her brother stopped by to visit. She has plans to go for a walk with each of her daughters. Katerina was able to process her feelings of her upcoming transition from the 55+ program. She was able to identify activities and community resources in the [place] area. She also was able to identify interests in addition to her flower garden, re: quilting, knitting, reading, go to the library and walking. Her  works part time at [employer]. She continues to work on having self autonomy. She was able to go to [employer] by herself. She is aware of senior activities at [facility] in [place].   As a theme, group members discussed coping skills including mindfulness as a tool for symptom management of depression and anxiety.  Rafy Solis was provided as a resource. Summer activities to structure time were also highlighted.    In Psychotherapy Session 2, group members discussed the relationship of how being over productive (your activity level) can impact the level of mood symptoms.  We discussed and used CBT as a tool to process a relapse. Katerina demonstrated an ability to identify feelings namely anxiety.         Is this a Weekly Review of the Progress on the Treatment Plan?  Yes.      Are Treatment Plan Goals being addressed?  Yes, treatment goals revised      Are Treatment Plan Strategies to Address Goals Effective?  Yes, continue treatment strategies      Are  there any current contracts in place?  No

## 2018-05-25 ENCOUNTER — HOSPITAL ENCOUNTER (OUTPATIENT)
Dept: BEHAVIORAL HEALTH | Facility: CLINIC | Age: 73
End: 2018-05-25
Attending: NURSE PRACTITIONER
Payer: MEDICARE

## 2018-05-25 PROCEDURE — H2012 BEHAV HLTH DAY TREAT, PER HR: HCPCS

## 2018-05-25 NOTE — PROGRESS NOTES
"  Adult Mental Health Outpatient Group Therapy Progress Note     Client Initial Individualized Goals for Treatment:Katerina verbalizes the following treatment/discharge goals: \"To decrease the fear of each day. Increase my self confidence again. To be able to face the day. Decrease anxiety symptoms\".      See Initial Treatment suggestions for the client during the time between Diagnostic Assessment and completion of the Master Individualized Treatment Plan.    Treatment Goals:  . Will plan and problem-solve to return to previous socia, enjoyable activities and decrease lonliness and isolation  Will plan and problem-solve to decrease fears and anxiety and increase self confidence  Client will notify staff when needing assistance to develop or implement a coping plan to manage suicidal or self injurious urges.       Area of Treatment Focus:  Symptom Management and Develop Socialization / Interpersonal Relationship Skills    Therapeutic Interventions/Treatment Strategies:  Support, Feedback, Structured Activity and Clarification    Response to Treatment Strategies:  Accepted Feedback, Gave Feedback, Listened, Focused on Goals, Attentive, Accepted Support and Alert    Name of Group:  Mental health management 200-250     Description and Outcome:  Topic- Mindfulness- Used acronym GLAD ( Gratitude, Learning, Accomplishments, Clive). Reviewed the scientific evidence that indicates mindfulness based activities can decrease distress and improve life quality. Reviewed definitions of mindfulness with everyone taking a turn to read article. Completed class by having all clients fill out 1 item under each category. Katerina appears less anxious, not engaging in as much negative talk, and more problem solving. Engaged and smiling, stated grateful for her family.    Client demonstrated understanding of session content by sharing her morning prayer practice and quiet time that helps her set the tone for her day. Improving and starting " to use skills to address rumination.  Is this a Weekly Review of the Progress on the Treatment Plan?  No

## 2018-05-25 NOTE — PROGRESS NOTES
Group Therapy Progress Notes       See Initial Treatment suggestions for the client during the time between Diagnostic Assessment and completion of the Master Individualized Treatment Plan.      Treatment Goals:      Will plan and problem-solve to return to previous socia, enjoyable activities and decrease lonliness and isolation  Will plan and problem-solve to decrease fears and anxiety and increase self confidence  Client will notify staff when needing assistance to develop or implement a coping plan to manage suicidal or self injurious urges.    Area of Treatment Focus:  Symptom Management, Personal Safety, Community Resources/Discharge Planning, Abstinence/Relapse Prevention, Develop / Improve Independent Living Skills and Develop Socialization / Interpersonal Relationship Skills    Therapeutic Interventions/Treatment Strategies:  Support, Safety Assessments, Structured Activity and Education    Response to Treatment Strategies:  Accepted Feedback, Listened, Focused on Goals, Attentive and Accepted Support    Name of Group:  Psychotherapy Session1 (10:00-10:50 am); Psychotherapy Session 2 (11:00-11:50 am); Coping with Depression (1:00 pm)     Psychotherapy Group Description  Provides a therapeutic environment for clients to explore group dynamics including identifying current feelings and problem areas.  Emotional and rational cognitive interactions between individual persons in the group are facilitated and observed.  Personal dynamics of any individual patient may be discussed within the group setting.  Processes that help patients move toward emotional healing and modification of thought and behavior are used, such as facilitating improved interpersonal exchanges, group support, and reminiscing. The focus of the group is to enhance the client s problem solving skills as well as help clients learn more about themselves and how they relate to others. Group therapy provides opportunity to explore and practice  direct expressions of needs, thoughts, and feelings that guide behaviors to promote mental/emotional stability and improved interpersonal relationships.     Outcome:  In Psychotherapy Session 1, as a theme, Katerina reports anxious mood. Denies S/I or safety issues. She had a good couple of days. She went to look at om[employer] for her home. On Wednesday, she went for a walk with her daughter. She was able to go to [employer] by herself. She wrote a list of meaningful places to volunteer. She is exploring ideas to structure her time for when she transitions from the program.  She reports an increase in flexibility and is working through the feeling of control.  As a group, we discussed examples of cognitive distortions. Katerina  and group members discussed coping with an illness that impacts functioning level. Katerina  demonstrated an awareness into coping skills for symptom management.  She will resume the 55+ program on Tuesday, May 29.     In Psychotherapy Session 2, Katerina and the group reported on the importance of lifestyle balance including setting realistic activities to structure time. Katerina continues to explore ideas to structure her time. She reports on how she depends a lot on her . She is trying to have more self autonomy.         Coping with Depression Description and Outcome:     Katerina mapped  a change and explored how it impacted his physiology, emotions, cognition, behaviors and spirituality.  Pt s first identify a recent change in their lifestyle. They name how this change impacted these different areas mentioned above. They also identify 2-3 things that they can do in any of these areas to transform this pattern. Group members share their insights.  Writer also discussed how changes in lifestyle and thought patterns impact the sympathetic nervous system (flight/fight response) and the mood disorder. Basic CBT model explored.       Is this a Weekly Review of the Progress on the Treatment  Plan?  Yes.      Are Treatment Plan Goals being addressed?  Yes, treatment goals revised      Are Treatment Plan Strategies to Address Goals Effective?  Yes, continue treatment strategies      Are there any current contracts in place?  No

## 2018-05-29 ENCOUNTER — HOSPITAL ENCOUNTER (OUTPATIENT)
Dept: BEHAVIORAL HEALTH | Facility: CLINIC | Age: 73
End: 2018-05-29
Attending: NURSE PRACTITIONER
Payer: MEDICARE

## 2018-05-29 PROCEDURE — H2012 BEHAV HLTH DAY TREAT, PER HR: HCPCS

## 2018-05-29 NOTE — PROGRESS NOTES
"  Adult Mental Health Outpatient Group Therapy Progress Note       See Initial Treatment suggestions for the client during the time between Diagnostic Assessment and completion of the Master Individualized Treatment Plan.    Treatment Goals:     Will plan and problem-solve to return to previous socia, enjoyable activities and decrease lonliness and isolation  Will plan and problem-solve to decrease fears and anxiety and increase self confidence                       Status: Active  Client will notify staff when needing assistance to develop or implement a coping plan to manage suicidal or self injurious urges.       Area of Treatment Focus:  Symptom Management, Personal Safety, Community Resources/Discharge Planning and Develop Socialization / Interpersonal Relationship Skills    Therapeutic Interventions/Treatment Strategies:  Support, Feedback, Structured Activity, Problem Solving, Clarification and Education    Response to Treatment Strategies:  Accepted Feedback, Gave Feedback, Listened, Focused on Goals, Attentive, Accepted Support and Alert    Name of Group:  Psychotherapy session 1: [id number] session 2: [id number]     Description and Outcome:  In session 1 client reports a \"pretty good weekend\". She walked with others twice, bought flowers and babysat her granddaughter. Katerina sees this as progress, but quickly follows up with those things that she is nervous about. She has been invited to go to a movie and dinner with her book club, but feels this will be stressful. We discussed ways to feel better about it. She is also anxious about choosing [employer] for their house and she struggles with indecision. Her  has given her a deadline of tonight. Client is also anxious about 6/22 discharge date. We discussed the importance of discharge planning and structuring her time. She has received several recommendations for this, including [facility], senior center and volunteering, but has not followed through yet. "   Client demonstrates an understanding of session content by sharing, listening and giving feedback.  In session 2 client participated in a discussion about the importance of structuring one's time when recovering from depression. Client made plan to research volunteer jobs and walk this week. Client reports difficulty with planning her time and would benefit from further discussion.    Is this a Weekly Review of the Progress on the Treatment Plan?  No

## 2018-05-29 NOTE — PROGRESS NOTES
"  Adult Mental Health Outpatient Group Therapy Progress Note     Client Initial Individualized Goals for Treatment:Katerina verbalizes the following treatment/discharge goals: \"To decrease the fear of each day. Increase my self confidence again. To be able to face the day. Decrease anxiety symptoms\".      See Initial Treatment suggestions for the client during the time between Diagnostic Assessment and completion of the Master Individualized Treatment Plan.    Treatment Goals:  . Will plan and problem-solve to return to previous socia, enjoyable activities and decrease lonliness and isolation  Will plan and problem-solve to decrease fears and anxiety and increase self confidence  Client will notify staff when needing assistance to develop or implement a coping plan to manage suicidal or self injurious urges.     Area of Treatment Focus:  Symptom Management and Develop Socialization / Interpersonal Relationship Skills    Therapeutic Interventions/Treatment Strategies:  Support, Feedback, Structured Activity, Clarification and Education    Response to Treatment Strategies:  Accepted Feedback, Gave Feedback, Listened, Focused on Goals, Attentive, Accepted Support and Alert    Name of Group:  Mental health management 200-250     Description and Outcome:  The group was provided with a guided breathing and warmup structure with focus on increasing self awareness and on providing an embodied experience.  Discussion included the importance of listening to body cues as a way of identifying emotion as well as accompanying needs and wants, and as a way of practising self compassion, authenticity, mindfulness, self expression,  and connection to other.  The focused on taking time to breathe and to be gentle with oneself.  Brief exploration of \"shaking it out\" was encouraged as a means to energize as well as provide opportunity for self regulation.  Client demonstrated understanding of session by participating in experiential and " sharing personal experience. Katerina reports increased ability to use breath to manage anxiety, although she reports she often forgets to use this skill.    Is this a Weekly Review of the Progress on the Treatment Plan?  No

## 2018-06-01 ENCOUNTER — HOSPITAL ENCOUNTER (OUTPATIENT)
Dept: BEHAVIORAL HEALTH | Facility: CLINIC | Age: 73
End: 2018-06-01
Attending: NURSE PRACTITIONER
Payer: MEDICARE

## 2018-06-01 PROCEDURE — H2012 BEHAV HLTH DAY TREAT, PER HR: HCPCS

## 2018-06-01 NOTE — PROGRESS NOTES
Adult Mental Health Outpatient Group Therapy Progress Note       See Initial Treatment suggestions for the client during the time between Diagnostic Assessment and completion of the Master Individualized Treatment Plan.    Treatment Goals:     Will plan and problem-solve to return to previous socia, enjoyable activities and decrease lonliness and isolation  Will plan and problem-solve to decrease fears and anxiety and increase self confidence                       Status: Active  Client will notify staff when needing assistance to develop or implement a coping plan to manage suicidal or self injurious urges.       Area of Treatment Focus:  Symptom Management, Personal Safety, Community Resources/Discharge Planning and Develop Socialization / Interpersonal Relationship Skills    Therapeutic Interventions/Treatment Strategies:  Support, Feedback, Safety Assessments, Structured Activity, Problem Solving, Clarification and Education    Response to Treatment Strategies:  Accepted Feedback, Gave Feedback, Listened, Focused on Goals, Attentive, Accepted Support and Alert    Name of Group:  Psychotherapy session 1: [id number] session 2: [id number]     Description and Outcome:  In session 1 client reports that she followed through with exploring volunteer work and that she met her book club for dinner this week. Katerina continues to worry about discharge and filling her time. She agreed to check into the [facility] or another place of her choosing before Tues. When asked what she used to do to fill her time before this depressive episode she could not remember.   Client demonstrates an understanding of the session by checking in and sharing feedback.  In session 2 a common theme of lack of fun and enjoyment was identified. Client shared her struggle with this, noting that she used to need less structure to enjoy herself and now needs to plan things.  Client reports problems in this area and needs additional information on  this topic. A continued discussion will occur in the upcoming weeks.    Is this a Weekly Review of the Progress on the Treatment Plan?  Yes.      Are Treatment Plan Goals being addressed?  Yes, continue treatment goals      Are Treatment Plan Strategies to Address Goals Effective?  Yes, continue treatment strategies      Are there any current contracts in place?  No

## 2018-06-01 NOTE — PROGRESS NOTES
"  Adult Mental Health Outpatient Group Therapy Progress Note     Client Initial Individualized Goals for Treatment:Katerina verbalizes the following treatment/discharge goals: \"To decrease the fear of each day. Increase my self confidence again. To be able to face the day. Decrease anxiety symptoms\".      See Initial Treatment suggestions for the client during the time between Diagnostic Assessment and completion of the Master Individualized Treatment Plan.    Treatment Goals:  . Will plan and problem-solve to return to previous socia, enjoyable activities and decrease lonliness and isolation  Will plan and problem-solve to decrease fears and anxiety and increase self confidence  Client will notify staff when needing assistance to develop or implement a coping plan to manage suicidal or self injurious urges.       Area of Treatment Focus:  Symptom Management and Develop Socialization / Interpersonal Relationship Skills    Therapeutic Interventions/Treatment Strategies:  Support, Feedback, Structured Activity and Clarification    Response to Treatment Strategies:  Accepted Feedback, Gave Feedback, Listened, Focused on Goals, Attentive, Accepted Support and Alert    Name of Group:  Mental health management 200-250     Description and Outcome:  Facilitated topic on Emotions and Emotional Regulation. Discussed primary and secondary emotions as well as ambivalence; the importance of emotions from an evolutionary perspective; and learning to regulate emotions.   Katerina appears much less anxious and ruminative. She reports she is working to examine her emotion of fear and question is it based on fact or is it not and if not to not allow it to control her as much as it has by staying active and using calming strategies. She is doing well.  Client demonstrated understanding of session content by sharing her skills around managing emotion of fear, how she is learning to examine it and not allow it to have so much control over her " behavior.  Is this a Weekly Review of the Progress on the Treatment Plan?  No

## 2018-06-05 ENCOUNTER — HOSPITAL ENCOUNTER (OUTPATIENT)
Dept: BEHAVIORAL HEALTH | Facility: CLINIC | Age: 73
End: 2018-06-05
Attending: NURSE PRACTITIONER
Payer: MEDICARE

## 2018-06-05 PROCEDURE — 99213 OFFICE O/P EST LOW 20 MIN: CPT | Performed by: NURSE PRACTITIONER

## 2018-06-05 PROCEDURE — H2012 BEHAV HLTH DAY TREAT, PER HR: HCPCS

## 2018-06-05 NOTE — PROGRESS NOTES
Group Therapy Progress Notes       See Initial Treatment suggestions for the client during the time between Diagnostic Assessment and completion of the Master Individualized Treatment Plan.      Treatment Goals:      Will plan and problem-solve to return to previous socia, enjoyable activities and decrease lonliness and isolation  Will plan and problem-solve to decrease fears and anxiety and increase self confidence  Client will notify staff when needing assistance to develop or implement a coping plan to manage suicidal or self injurious urges.     Area of Treatment Focus:  Symptom Management, Personal Safety, Community Resources/Discharge Planning, Abstinence/Relapse Prevention, Develop / Improve Independent Living Skills and Develop Socialization / Interpersonal Relationship Skills     Therapeutic Interventions/Treatment Strategies:  Support, Safety Assessments, Structured Activity and Education     Response to Treatment Strategies:  Accepted Feedback, Listened, Focused on Goals, Attentive and Accepted Support     Name of Group:  Psychotherapy Session1 (10:00-10:50 am); Psychotherapy Session 2 (11:00-11:50 am)      Psychotherapy Group Description  Provides a therapeutic environment for clients to explore group dynamics including identifying current feelings and problem areas.  Emotional and rational cognitive interactions between individual persons in the group are facilitated and observed.  Personal dynamics of any individual patient may be discussed within the group setting.  Processes that help patients move toward emotional healing and modification of thought and behavior are used, such as facilitating improved interpersonal exchanges, group support, and reminiscing. The focus of the group is to enhance the client s problem solving skills as well as help clients learn more about themselves and how they relate to others. Group therapy provides opportunity to explore and practice direct expressions of needs,  thoughts, and feelings that guide behaviors to promote mental/emotional stability and improved interpersonal relationships.      Outcome:  In Psychotherapy Session 1, Katerina reports anxious mood. Denies S/I or safety issues. She had a good couple of days. She went bowling with her two older grandchildren and enjoyed it. She went to a movie with her . She processed her feelings about transitioning from the 55+ program. She continues to explore organizations where she can  volunteer. She is exploring ideas to structure her time for when she transitions from the program.  She will go babysit her four year old grandElza lopez with her , [place] next week. Katerina will go to the [employer]. She reports an increase in flexibility and is working through the feeling of control.  As a group, we discussed the coping skills for symptom management of anxiety. Katerina is more aware of her self talk and reframing negative statements. She has got a  ticket for the [employer] for Friday, June 8 so she will not be attending the program.  She will resume the 55+ program on Tuesday, June 12.      In Psychotherapy Session 2, Katerina and the group reported on the importance of lifestyle balance including setting realistic activities for self.  She is trying to have more self autonomy. She demonstrated an awareness by naming meaningful self care activities.      Is this a Weekly Review of the Progress on the Treatment Plan?  Yes.  Katerina met with writer to review and update her treatment plan goals. She continues to use coping skills to manage anxiety symptoms. She uses mantra meditations, mindfulness and lavender neck wrap, gardening, and time with family as part of her relapse prevention plan. She continues to monitor her cognitive distortions and reframe negative self talk. She continues to explore volunteer opportunities to structure her time. She has community resources.    Are Treatment Plan Goals being  addressed?  Yes, treatment goals revised      Are Treatment Plan Strategies to Address Goals Effective?  Yes, continue treatment strategies      Are there any current contracts in place?  No

## 2018-06-05 NOTE — PROGRESS NOTES
"[facility]   Psychiatric Progress Note        Interim History:   From H&P: Katerina Crump is a 71 year old female referred to the 55 Plus Program by herself. This is her second time in the program and she finds it quiet helpful. Mrs. Crump has a history of bipolar disorder, however she is disputing this diagnosis saying that she has never been manic or hypomanic, she is usually depressed and anxious. States she was first diagnosed with depression at age 16, after one of her friends past away. She has been hospitalized twice, once in the 70's and once in 2015 at [facility]. She has never attempted suicide and has not had ECT. Her PCP is Dr. Coleman and her psychiatrist is Dr. Oneal whom she saw in April and will see again July 3rd. Current episode of depression and anxiety started in the spring of 2015 after a sergery to remove squamous cell carcinoma. She became very depressed, worried, unable to sleep, relax, eat or function in any way. She was contemplating suicide. She was hospitalized for 3 weeks at [facility] under Dr. Pereyra. She was discharged on regiment of Depakote, Klonopin Zyprexa and Vistaril. She was relatively stable until March of this year. She was scheduled to have an injection in March for osteoarthritis but does not feel that this has been stressful for her. She can't identify any stressors that may have contributed to her current mental states. Current symptoms include: negative thinking/image, high anxiety, poor sleep and appetite (lost 8 lbs this month), shakiness, racing thoughts, restlessness, fear of being alone, depression, and inability to function. She denies SI, SIB. She is avoiding her family and friends \"I don't want to be seen this way\". Her psychiatrist recently increased Depakote to a 1000 mg a day but she is not feeling much better.  Denies panic attacks, hallucinations, delusions, suicidal and homicidal ideation, self injuries " "behaviors, memory problems, obsessions, compulsions, specific fears, and manic symptoms. She is wondering if she needs to be hospitalised but states she hated the experience and does not want to go to the hospital.   The client's care was discussed with the treatment team during the daily team meeting and/or staff's chart notes were reviewed.  Staff report Katerina continues to be anxious and focusing on the negative. She has difficulties identifying anything positive in her life.  She is attending groups and participating appropriately.      12/5/17: Met with client for follow up. Continues to feel very anxious, states nothing worked so far. She is discouraged. Spends her days laying on the couch, does some house chores and go out once in a while. Denies SI, SIB. Depression is moderate. Denies manic and psychotic symptoms. Sleeps 8-9 hours each night. Believes appetite is good \"I always have trouble gaining weight\". Memory and concentration are good, however appear forgetful. She sees her psychiatrist every 2 weeks for med adjustment. We discussed ECT treatment, she does not want to go with it for now. Discussed medications changes. States she is now on Cogentin but wasn't sure about the dose. Propranolol was decreased to \"half a tablet at bed time\".  She saw her psychiatrist 2 weeks ago who refer her to another psychiatrist for a 2nd opinion which will ocure next week. She is also seeing a NP tomorrow \"a medication specialist, to go over my medication\".      3/6/18: Met with patient for follow up. States she is not doing well, anxiety and depression are high. However, states that she was able to tolerate being alone on Saturday because her  had to work. Reports that she can't be alone because \"I have a very low self esteem, and can't stand it\". Reports multiple physical problems including digestion, constipation, and new onset of back pain. States her GI problems cassie related to anxiety, the back pain is from " "a pulled muscle. She is seeing a PT once a week. Reports that sleep and appetite are \"OK\".   Katerina exercise on a daily basis by walking and doing yoga. She sees her friends about once a week. She is bored.  has a part time job, which she doesn't like. She talks to her children at least once a week.   Linette is seeing a therapist once a week and her psychiatrist once a month.  Recently started on Seroquel bid for anxiety and increase dose of Klonopin to tid.     6/5/18: Met with patient. States she is feeling more positive about the future. Still quiet anxious, \"this never changes\". Reports that she started Paxil about a month ago. Hopes to help with her anxiety. Patient is seeing a therapist about once a week, since 7/2017. Reports working on being involved with various activities, so she does not isolates. Exercise every day. Appetite is good. She is discharging from this p[rogram in 2 weeks. Feels it have been beneficial. Worries about not having a structure. Denies SI, HI. Looking forward to the future.          Medications:   Does not remember the exact doses of her medications, but thinks she has the following meds:  1. D/C Depakote   2. Seroquel 25 mg, bid  3. Klonopin 0.5 mg, tid   4. Gabapentin, 3tab in am, 4 tab at noon and 4 tab at bed time.  5. Paxil 40 mg, qam       Allergies:     Allergies   Allergen Reactions     Keflex [Cephalexin]      Nausea hard to eat          Labs:   No results found for this or any previous visit (from the past 672 hour(s)).         Psychiatric Examination:                      Weight is 0 lbs 0 oz  There is no height or weight on file to calculate BMI.    Appearance: well groomed, awake, alert, cooperative, mild distress and thin  Attitude:  cooperative  Eye Contact:  good  Mood:  anxious  Affect:  mood congruent  Speech:  clear, coherent  Psychomotor Behavior:  no evidence of tardive dyskinesia, dystonia, or tics  Throught Process:  logical and goal " oriented  Associations:  no loose associations  Thought Content:  no evidence of suicidal ideation or homicidal ideation, no auditory hallucinations present and no visual hallucinations present  Insight:  fair  Judgement:  fair  Oriented to:  time, person, and place  Attention Span and Concentration:  intact  Recent and Remote Memory:  fair         Precautions:           DIagnoses:   1. Bipolar Effective Disorder  2. Generalized Anxiety Disorder, recurent, severe       Plan:     1. Continue Senior Outpatient Program.   2. Continue current medications.   3. The patient was encourage to follow up with PCP and Psychiatrist.   4. The patient was advised to let us know if inpatient admission or further help is needed.  5. The patient was advised to get involved in the community in order to continues to improve. Care was coordinated with the treatment team.    Celso GURROLA CNP  Date: 06/05/18  Time: 1:26 PM

## 2018-06-05 NOTE — PROGRESS NOTES
Psychiatry staffing: case discussed  Diagnosis: Bipolar. Doing well. Will discharge soon.     Current Outpatient Prescriptions   Medication     calcium carbonate (OS- MG [place]. [place]) 500 MG tablet     clonazePAM (KLONOPIN) 0.5 MG tablet     Divalproex Sodium (DEPAKOTE PO)     GABAPENTIN PO     MELATONIN PO     multivitamin, therapeutic (THERA-VIT) TABS     OLANZapine (ZYPREXA PO)     OLANZapine (ZYPREXA PO)     No current facility-administered medications for this encounter.      Past Medical History:   Diagnosis Date     Arthritis     oesteoporosis and OA-hands     Cancer (H) 2015    skin     Depressive disorder     Bipolar

## 2018-06-05 NOTE — PROGRESS NOTES
"  Adult Mental Health Outpatient Group Therapy Progress Note     Client Initial Individualized Goals for Treatment:Katerina verbalizes the following treatment/discharge goals: \"To decrease the fear of each day. Increase my self confidence again. To be able to face the day. Decrease anxiety symptoms\".      See Initial Treatment suggestions for the client during the time between Diagnostic Assessment and completion of the Master Individualized Treatment Plan.    Treatment Goals:  . Will plan and problem-solve to return to previous socia, enjoyable activities and decrease lonliness and isolation  Will plan and problem-solve to decrease fears and anxiety and increase self confidence  Client will notify staff when needing assistance to develop or implement a coping plan to manage suicidal or self injurious urges.     Area of Treatment Focus:  Symptom Management and Develop Socialization / Interpersonal Relationship Skills    Therapeutic Interventions/Treatment Strategies:  Support, Feedback, Structured Activity and Clarification    Response to Treatment Strategies:  Accepted Feedback, Gave Feedback, Listened, Focused on Goals, Attentive, Accepted Support and Alert    Name of Group:  Mental health management 200-250     Description and Outcome:  The group was provided with a guided breathing and warmup structure with focus on increasing self awareness and on providing an embodied experience.  Discussion included the importance of listening to body cues as a way of identifying emotion as well as accompanying needs and wants, and as a way of practising self compassion, authenticity, mindfulness, self expression,  and connection to other.  After identifying feelings of anxiety regarding upcoming transitions the group focused on mindfulness and taking the time to breathe. Katerina demonstrated understanding of session by adding flow to her movement repetoire.  She verbalized understanding by identifying moments during the session " in which she was fully present.    Is this a Weekly Review of the Progress on the Treatment Plan?  No

## 2018-06-07 NOTE — PROGRESS NOTES
"  Adult Mental Health Outpatient Group Therapy Progress Note     Client Initial Individualized Goals for Treatment:Katerina verbalizes the following treatment/discharge goals: \"To decrease the fear of each day. Increase my self confidence again. To be able to face the day. Decrease anxiety symptoms\".        See Initial Treatment suggestions for the client during the time between Diagnostic Assessment and completion of the Master Individualized Treatment Plan.     Treatment Goals:  1. Will plan and problem-solve to return to previous social, enjoyable activities and decrease lonliness and isolation    2. Will plan and problem-solve to decrease fears and anxiety and increase self confidence    3. Client will notify staff when needing assistance to develop or implement a coping plan to manage suicidal or self injurious urges.     Area of Treatment Focus:  Symptom Management, Develop / Improve Independent Living Skills and Develop Socialization / Interpersonal Relationship Skills    Therapeutic Interventions/Treatment Strategies:  Support, Feedback, Safety Assessments, Structured Activity and Problem Solving    Response to Treatment Strategies:  Accepted Feedback, Gave Feedback, Listened, Focused on Goals, Attentive and Accepted Support    Name of Group:  Wellness 100-150    Description and Outcome:  Client attended and participated in a structured life skills psychoeducation group where intervention focused on experiential learning, practice, and generalization of skills through the use of supported social interaction, structured therapeutic tasks, and engagement in functional tasks in order to improve function in valued roles, routines, and independent living skills. Client had a calm, even affect in session. She was supportive of peers during session. Provided client with verbal, written, and experiential psychoeducation material focused on problem solving, critical thinking, and communication skills in order to promote " participation in valued roles, routines, and relationships. Client demonstrated understanding of session content by applying skills discussed in session. Client addressed ITP goal number 1 in this session.    Is this a Weekly Review of the Progress on the Treatment Plan?  No

## 2018-06-12 ENCOUNTER — HOSPITAL ENCOUNTER (OUTPATIENT)
Dept: BEHAVIORAL HEALTH | Facility: CLINIC | Age: 73
End: 2018-06-12
Attending: NURSE PRACTITIONER
Payer: MEDICARE

## 2018-06-12 PROCEDURE — H2012 BEHAV HLTH DAY TREAT, PER HR: HCPCS

## 2018-06-12 NOTE — PROGRESS NOTES
Group Therapy Progress Notes     See Initial Treatment suggestions for the client during the time between Diagnostic Assessment and completion of the Master Individualized Treatment Plan.      Treatment Goals:      Will plan and problem-solve to return to previous socia, enjoyable activities and decrease lonliness and isolation  Will plan and problem-solve to decrease fears and anxiety and increase self confidence  Client will notify staff when needing assistance to develop or implement a coping plan to manage suicidal or self injurious urges.    Area of Treatment Focus:  Symptom Management, Personal Safety, Community Resources/Discharge Planning, Abstinence/Relapse Prevention, Develop / Improve Independent Living Skills and Develop Socialization / Interpersonal Relationship Skills    Therapeutic Interventions/Treatment Strategies:  Support, Safety Assessments, Structured Activity and Education    Response to Treatment Strategies:  Accepted Feedback, Listened, Focused on Goals, Attentive and Accepted Support    Name of Group:  Psychotherapy Session1 (10:00-10:50 am); Psychotherapy Session 2 (11:00-11:50 am)      Psychotherapy Group Description  Provides a therapeutic environment for clients to explore group dynamics including identifying current feelings and problem areas.  Emotional and rational cognitive interactions between individual persons in the group are facilitated and observed.  Personal dynamics of any individual patient may be discussed within the group setting.  Processes that help patients move toward emotional healing and modification of thought and behavior are used, such as facilitating improved interpersonal exchanges, group support, and reminiscing. The focus of the group is to enhance the client s problem solving skills as well as help clients learn more about themselves and how they relate to others. Group therapy provides opportunity to explore and practice direct expressions of needs,  thoughts, and feelings that guide behaviors to promote mental/emotional stability and improved interpersonal relationships.      Outcome:  In Psychotherapy Session 1, Katerina and peers in group discussed identifying feelings and needs as a theme. She is aware of emotion regulation tools.  She will resume the 55+ program on Friday, Malena 15.        In Psychotherapy Session 2, Katerina reports less anxious mood. Denies S/I or safety issues.  She continues to find activities to not only structure her time but for self care. She attended the [employer] and enjoyed it. She also gardened, read, walked with her daughter and went to babysit her granddaughter, Elza with her . Katerina processed her feelings about leaving the group.She also reports more acceptance of having anxiety. She is practicing mindfulness.  She has a therapist that she will consult with weekly for now  but then every other week after she transitioned from the program. She was provided with ROSANA and DBS[employer] resources. Katerina and group members discussed the theme of vulnerability and authenticity.  Katerina demonstrates an understanding of group content by fully  participating, sharing their feelings, and problem solving. Also accepted feedback and support when appropriate.      Is this a Weekly Review of the Progress on the Treatment Plan?  Yes.      Are Treatment Plan Goals being addressed?  Yes, treatment goals revised      Are Treatment Plan Strategies to Address Goals Effective?  Yes, continue treatment strategies      Are there any current contracts in place?  No

## 2018-06-13 NOTE — PROGRESS NOTES
"  Adult Mental Health Outpatient Group Therapy Progress Note     Client Initial Individualized Goals for Treatment:Katerina verbalizes the following treatment/discharge goals: \"To decrease the fear of each day. Increase my self confidence again. To be able to face the day. Decrease anxiety symptoms\".      See Initial Treatment suggestions for the client during the time between Diagnostic Assessment and completion of the Master Individualized Treatment Plan.    Treatment Goals:  . Will plan and problem-solve to return to previous socia, enjoyable activities and decrease lonliness and isolation  Will plan and problem-solve to decrease fears and anxiety and increase self confidence  Client will notify staff when needing assistance to develop or implement a coping plan to manage suicidal or self injurious urges.       Area of Treatment Focus:  Symptom Management and Develop Socialization / Interpersonal Relationship Skills    Therapeutic Interventions/Treatment Strategies:  Support, Feedback, Structured Activity and Clarification    Response to Treatment Strategies:  Accepted Feedback, Gave Feedback, Listened, Focused on Goals, Attentive, Accepted Support and Alert    Name of Group:  Mental health management 200-250     Description and Outcome:  The group was provided with a guided breathing and warmup structure with focus on increasing self awareness and on providing an embodied experience.  Discussion included the importance of listening to body cues as a way of identifying emotion as well as accompanying needs and wants, and as a way of practising self compassion, authenticity, mindfulness, self expression,  and connection to other.  The group checked in stating they were wanting to energize and to receive \"comfort.\"  The group used the movement structure to develop a phrase that included the \"4Cs\"  Challenge, creativity, curiousity and connection.  Katerina verbalized understanding of session by identifying challenge and " connection as important to feeling energized and in managing stress.    Is this a Weekly Review of the Progress on the Treatment Plan?  No

## 2018-06-15 ENCOUNTER — HOSPITAL ENCOUNTER (OUTPATIENT)
Dept: BEHAVIORAL HEALTH | Facility: CLINIC | Age: 73
End: 2018-06-15
Attending: NURSE PRACTITIONER
Payer: MEDICARE

## 2018-06-15 PROCEDURE — H2012 BEHAV HLTH DAY TREAT, PER HR: HCPCS

## 2018-06-15 NOTE — PROGRESS NOTES
Group Therapy Progress Notes     See Initial Treatment suggestions for the client during the time between Diagnostic Assessment and completion of the Master Individualized Treatment Plan.      Treatment Goals:      Will plan and problem-solve to return to previous socia, enjoyable activities and decrease lonliness and isolation  Will plan and problem-solve to decrease fears and anxiety and increase self confidence  Client will notify staff when needing assistance to develop or implement a coping plan to manage suicidal or self injurious urges.    Area of Treatment Focus:  Symptom Management, Personal Safety, Community Resources/Discharge Planning, Abstinence/Relapse Prevention, Develop / Improve Independent Living Skills and Develop Socialization / Interpersonal Relationship Skills    Therapeutic Interventions/Treatment Strategies:  Support, Safety Assessments, Structured Activity and Education    Response to Treatment Strategies:  Accepted Feedback, Listened, Focused on Goals, Attentive and Accepted Support    Name of Group:  Psychotherapy Session1 (10:00-10:50 am); Psychotherapy Session 2 (11:00-11:50 am); Mental Health Management (2:00-2:50 pm)      Psychotherapy Group Description  Provides a therapeutic environment for clients to explore group dynamics including identifying current feelings and problem areas.  Emotional and rational cognitive interactions between individual persons in the group are facilitated and observed.  Personal dynamics of any individual patient may be discussed within the group setting.  Processes that help patients move toward emotional healing and modification of thought and behavior are used, such as facilitating improved interpersonal exchanges, group support, and reminiscing. The focus of the group is to enhance the client s problem solving skills as well as help clients learn more about themselves and how they relate to others. Group therapy provides opportunity to explore and  practice direct expressions of needs, thoughts, and feelings that guide behaviors to promote mental/emotional stability and improved interpersonal relationships.      Outcome:  In Psychotherapy Session 1, Katerina and peers in group discussed identifying feelings and needs as a theme. She is aware of emotion regulation tools.  She will resume the 55+ program on Tuesday, June 19.        In Psychotherapy Session 2, Katerina reports less anxious mood. Denies S/I or safety issues.  She continues to find activities to not only structure her time but for self care.  She continues to  garden, read, walked with her daughter and spend time with other family members. She also volunteers. Katerina continues to process her feelings about leaving the group. We discussed coping with transitions. She also reports more acceptance of having anxiety. She is practicing mindfulness and using reassuring self talk.  Katerina and group members discussed the theme coping with transition as well as naming self care activities.      In Mental Health Management, Katerina  was also able to make a map of her relationships and identify interpersonal relationship dynamics.  Katerina demonstrates an understanding of group content by fully  participating, sharing her feelings, and problem solving. Also accepted feedback and support when appropriate.        Is this a Weekly Review of the Progress on the Treatment Plan?  Yes.      Are Treatment Plan Goals being addressed?  No, treatment goals revised      Are Treatment Plan Strategies to Address Goals Effective?  Yes, continue treatment strategies      Are there any current contracts in place?  No

## 2018-06-15 NOTE — PROGRESS NOTES
Adult Mental Health Outpatient Group Therapy Progress Note       See Initial Treatment suggestions for the client during the time between Diagnostic Assessment and completion of the Master Individualized Treatment Plan.    Treatment Goals:     Will plan and problem-solve to return to previous socia, enjoyable activities and decrease lonliness and isolation  Will plan and problem-solve to decrease fears and anxiety and increase self confidence                       Status: Active  Client will notify staff when needing assistance to develop or implement a coping plan to manage suicidal or self injurious urges.       Area of Treatment Focus:  Symptom Management and Develop Socialization / Interpersonal Relationship Skills    Therapeutic Interventions/Treatment Strategies:  Support, Feedback, Structured Activity, Problem Solving, Clarification and Education    Response to Treatment Strategies:  Accepted Feedback, Gave Feedback, Listened, Focused on Goals, Attentive, Accepted Support and Alert    Name of Group:  Coping with Depression session time: 3080-0461    Description and Outcome:  Client learned about communication styles highlighting the benefits of assertiveness, especially when getting needs met, when depressed. Katerina could identify examples of ways that she would like to increase assertiveness in her own life.  Client demonstrates an understanding of session content by participating, sharing and giving feedback. Client indicates a desire to increase assertive communication and can benefit from further discussion around this topic.    Is this a Weekly Review of the Progress on the Treatment Plan?  No

## 2018-06-19 ENCOUNTER — HOSPITAL ENCOUNTER (OUTPATIENT)
Dept: BEHAVIORAL HEALTH | Facility: CLINIC | Age: 73
End: 2018-06-19
Attending: NURSE PRACTITIONER
Payer: MEDICARE

## 2018-06-19 PROCEDURE — H2012 BEHAV HLTH DAY TREAT, PER HR: HCPCS

## 2018-06-19 NOTE — PROGRESS NOTES
Group Therapy Progress Notes     See Initial Treatment suggestions for the client during the time between Diagnostic Assessment and completion of the Master Individualized Treatment Plan.     Treatment Goals:     Will plan and problem-solve to return to previous socia, enjoyable activities and decrease lonliness and isolation  Will plan and problem-solve to decrease fears and anxiety and increase self confidence  Client will notify staff when needing assistance to develop or implement a coping plan to manage suicidal or self injurious urges.       Area of Treatment Focus:  Symptom Management, Personal Safety, Community Resources/Discharge Planning, Abstinence/Relapse Prevention, Develop / Improve Independent Living Skills and Develop Socialization / Interpersonal Relationship Skills    Therapeutic Interventions/Treatment Strategies:  Support, Safety Assessments, Structured Activity and Education    Response to Treatment Strategies:  Accepted Feedback, Listened, Focused on Goals, Attentive and Accepted Support    Name of Group: Psychotherapy Group 1 (10:00-10:00 am) and Psychotherapy Group 2 (11:00-11:50 am)      Psychotherapy Group Description  Provides a therapeutic environment for clients to explore group dynamics including identifying current feelings and problem areas.  Emotional and rational cognitive interactions between individual persons in the group are facilitated and observed.  Personal dynamics of any individual patient may be discussed within the group setting.  Processes that help patients move toward emotional healing and modification of thought and behavior are used, such as facilitating improved interpersonal exchanges, group support, and reminiscing. The focus of the group is to enhance the client s problem solving skills as well as help clients learn more about themselves and how they relate to others. Group therapy provides opportunity to explore and practice direct expressions of needs,  thoughts, and feelings that guide behaviors to promote mental/emotional stability and improved interpersonal relationships.  Outcome:   In Psychotherapy Group 1, Katerina reports less anxiety mood. Denies S/I or safety issues. She continues to process her upcoming transition from the program on Friday, June 22. She has learned way to manage the anxiety symptoms. She has ideas to structure her time. She continue to apply for volunteer positions. She has an orientation meeting set up with [employer]. She is also exploring volunteering at [facility]. Katerina andher  had Pizza with her daughter's family on Sunday. Then about 11 family members got together on Monday night for dinner to celebrate Father's Day. She looked at the schedule and will attend the [facility]. Katerina  and peers in  group discussed symptom management skills for mood stabilization.  Her tentative last day in the program in Friday, June 21.     In Psychotherapy Group 2, Katerina and group discussed the theme of ways to problem solve.  Katerina demonstrates an understanding of group content by fully  participating, sharing her feelings, and problem solving. Also accepted feedback and support when appropriate.      Is this a Weekly Review of the Progress on the Treatment Plan?  Yes.      Are Treatment Plan Goals being addressed?  No, treatment goals revised      Are Treatment Plan Strategies to Address Goals Effective?  Yes, continue treatment strategies      Are there any current contracts in place?  No

## 2018-06-20 NOTE — PROGRESS NOTES
"  Adult Mental Health Outpatient Group Therapy Progress Note     Client Initial Individualized Goals for Treatment:Katerina verbalizes the following treatment/discharge goals: \"To decrease the fear of each day. Increase my self confidence again. To be able to face the day. Decrease anxiety symptoms\".      See Initial Treatment suggestions for the client during the time between Diagnostic Assessment and completion of the Master Individualized Treatment Plan.    Treatment Goals:  . Will plan and problem-solve to return to previous socia, enjoyable activities and decrease lonliness and isolation  Will plan and problem-solve to decrease fears and anxiety and increase self confidence  Client will notify staff when needing assistance to develop or implement a coping plan to manage suicidal or self injurious urges.     Area of Treatment Focus:  Symptom Management and Develop Socialization / Interpersonal Relationship Skills    Therapeutic Interventions/Treatment Strategies:  Support, Feedback, Structured Activity, Clarification and Education    Response to Treatment Strategies:  Accepted Feedback, Gave Feedback, Listened, Focused on Goals, Attentive, Accepted Support and Alert    Name of Group:  Mental health management 200-250     Description and Outcome:  The group was provided with a guided breathing and warmup structure with focus on increasing self awareness and on providing an embodied experience.  Discussion included the importance of listening to body cues as a way of identifying emotion as well as accompanying needs and wants, and as a way of practising self compassion, authenticity, mindfulness, self expression,  and connection to other.  Education was provided regarding mindfulness, being mindful of oneself and of others.  The group discussed the importance of eye contact and connection in well being.  The group developed a phrase which included being mindful of self and of other.  Client demonstrated understanding " of session by participating in experiential and discussion. Katerina was engaged and shared (with peer also discharging this week) leading the closing phrase.  Reviewed her process and observations regarding use of play and spontaneity to stay present and manage anxiety.    Is this a Weekly Review of the Progress on the Treatment Plan?  No

## 2018-06-22 ENCOUNTER — HOSPITAL ENCOUNTER (OUTPATIENT)
Dept: BEHAVIORAL HEALTH | Facility: CLINIC | Age: 73
End: 2018-06-22
Attending: NURSE PRACTITIONER
Payer: MEDICARE

## 2018-06-22 PROCEDURE — H2012 BEHAV HLTH DAY TREAT, PER HR: HCPCS

## 2018-06-22 NOTE — PROGRESS NOTES
"  Adult Mental Health Outpatient Group Therapy Progress Note     Client Initial Individualized Goals for Treatment:Katerina verbalizes the following treatment/discharge goals: \"To decrease the fear of each day. Increase my self confidence again. To be able to face the day. Decrease anxiety symptoms\".      See Initial Treatment suggestions for the client during the time between Diagnostic Assessment and completion of the Master Individualized Treatment Plan.    Treatment Goals:  . Will plan and problem-solve to return to previous socia, enjoyable activities and decrease lonliness and isolation  Will plan and problem-solve to decrease fears and anxiety and increase self confidence  Client will notify staff when needing assistance to develop or implement a coping plan to manage suicidal or self injurious urges.     Area of Treatment Focus:  Symptom Management and Develop Socialization / Interpersonal Relationship Skills    Therapeutic Interventions/Treatment Strategies:  Support, Feedback, Structured Activity, Clarification and Education    Response to Treatment Strategies:  Accepted Feedback, Gave Feedback, Listened, Focused on Goals, Attentive, Accepted Support and Alert    Name of Group:  Mental health management 200-250     Description and Outcome:  Group worked together out of the workbook, \" WILD 5 WELLNESS, Ancient Practices for Modern Times\".  Discussed scientific finding that: Meditation, Exercise, Nutrition, Sleep, and Social Connectivity have historically been used to navigate life's difficulties. We did one worksheet focusing on one way each member was going to try to create some happiness tomorrow. Katerina doing well, said her goodbyes and reviewed skills and shared humor story with group.  Client demonstrated understanding of session content by focused, positive, calmer, ready for discharge.    Is this a Weekly Review of the Progress on the Treatment Plan?  No        "

## 2018-06-22 NOTE — PROGRESS NOTES
Group Therapy Progress Notes     See Initial Treatment suggestions for the client during the time between Diagnostic Assessment and completion of the Master Individualized Treatment Plan.      Treatment Goals:      Will plan and problem-solve to return to previous socia, enjoyable activities and decrease lonliness and isolation  Will plan and problem-solve to decrease fears and anxiety and increase self confidence  Client will notify staff when needing assistance to develop or implement a coping plan to manage suicidal or self injurious urges.        Area of Treatment Focus:  Symptom Management, Personal Safety, Community Resources/Discharge Planning, Abstinence/Relapse Prevention, Develop / Improve Independent Living Skills and Develop Socialization / Interpersonal Relationship Skills     Therapeutic Interventions/Treatment Strategies:  Support, Safety Assessments, Structured Activity and Education     Response to Treatment Strategies:  Accepted Feedback, Listened, Focused on Goals, Attentive and Accepted Support     Name of Group: Psychotherapy Group 1 (10:00-10:00 am); Psychotherapy Group 2 (11:00-11:50 am); Coping with Depression (1:00-1:50)        Psychotherapy Group Description  Provides a therapeutic environment for clients to explore group dynamics including identifying current feelings and problem areas.  Emotional and rational cognitive interactions between individual persons in the group are facilitated and observed.  Personal dynamics of any individual patient may be discussed within the group setting.  Processes that help patients move toward emotional healing and modification of thought and behavior are used, such as facilitating improved interpersonal exchanges, group support, and reminiscing. The focus of the group is to enhance the client s problem solving skills as well as help clients learn more about themselves and how they relate to others. Group therapy provides opportunity to explore and  practice direct expressions of needs, thoughts, and feelings that guide behaviors to promote mental/emotional stability and improved interpersonal relationships.  Outcome:   In Psychotherapy Group 1, Katerina reports stable mood. S/I or safety issues. She processed her feelings about it being the last day in the day treatment program. She states she is ready and has ways to cope with her mood symptoms. She also is aware of ways to structure her time. She walks with her daughter, watches her grandchild overnight, attends a book club, volunteers and spends time with friends. She also continues to enjoy gardening. Katerina has a relapse prevention plan with community resources. She will follow up with her therapist, Cathy España at [facility] and medication management provider, Radha Terrazas at [facility]. She will explore support groups at [facility] and [facility]. Katerina accepted well wishes from peers in group. Ktaerina  and peers in  group discussed relapse prevention tools as a theme. Katerina was d/c from the day treatment program today.     In Psychotherapy Group 2, Katerina and group discussed the theme of problem solving.   Katerina demonstrates an understanding of group content by fully  participating, sharing her feelings, and problem solving. Also accepted feedback and support when appropriate.    Description and Outcome of Coping with Depression:   Katerina was able to identify her strengths and create a personal affirmation statement to cultivate self esteem and use as a cognitive behavioral tool. She shared with a peer in group. D/C paperwork signed and completed.         Is this a Weekly Review of the Progress on the Treatment Plan?  Yes. Katerina met her treatment plan goals. She is aware of the pattern of her mood symptoms, coping skills needed and the role of support needed in her recovery. She continues to find ways to structure her time both in and outside of her home. Katerina is using CBT skills as well as mindfulness  tools. She has community resources: therapist, medication management provider and community support group.      Are Treatment Plan Goals being addressed?  No, treatment goals revised        Are Treatment Plan Strategies to Address Goals Effective?  Yes, continue treatment strategies        Are there any current contracts in place?  No

## 2020-08-03 NOTE — PROGRESS NOTES
"  Adult Mental Health Outpatient Group Therapy Progress Note       See Initial Treatment suggestions for the client during the time between Diagnostic Assessment and completion of the Master Individualized Treatment Plan.    Treatment Goals:     Will plan and problem-solve to return to previous socia, enjoyable activities and decrease lonliness and isolation  Will plan and problem-solve to decrease fears and anxiety and increase self confidence  Client will notify staff when needing assistance to develop or implement a coping plan to manage suicidal or self injurious urges.       Area of Treatment Focus:  Symptom Management, Community Resources/Discharge Planning and Develop Socialization / Interpersonal Relationship Skills    Therapeutic Interventions/Treatment Strategies:  Support, Feedback, Structured Activity, Problem Solving, Clarification and Education    Response to Treatment Strategies:  Accepted Feedback, Gave Feedback, Listened, Focused on Goals, Attentive, Accepted Support and Alert    Name of Group:  Psychotherapy , Coping With Depression     Description and Outcome:  Hour 1: Katerina shared that she was feeling ok today. She still is quite troubled by the amount of anxiety she feels when she is at her home and has nothing scheduled to do. She indicated that she can't relax or sit still. She needs to find ways to get out of the house. She is beginning to focus more on the positive things she is accomplishing. She has not yet called to schedule her neuropsych testing.  Hour 2: The group shared their thoughts on the progress they have been making and how they might better use the group to pursue the changes they are wanting in their lives.       Coping With Depression: The client actively participated in reading and discussing the handout, \"10 Strategies for Staying Stuck\". Clients identified items from the handout that they could put into practice to try and resolve obstacles towards their own progress and " also discuss what the impact might be on their spouse or partner.     Client demonstrates an understanding of group content by fully participating, sharing and problem solving.        Is this a Weekly Review of the Progress on the Treatment Plan?  Yes.      Are Treatment Plan Goals being addressed?  Yes, continue treatment goals      Are Treatment Plan Strategies to Address Goals Effective?  Yes, continue treatment strategies      Are there any current contracts in place?  No       (4) no limitation

## 2022-05-23 NOTE — PROGRESS NOTES
"  Adult Mental Health Outpatient Group Therapy Progress Note     Client Initial Individualized Goals for Treatment:Katerina verbalizes the following treatment/discharge goals: \"To decrease the fear of each day. Increase my self confidence again. To be able to face the day. Decrease anxiety symptoms\".      See Initial Treatment suggestions for the client during the time between Diagnostic Assessment and completion of the Master Individualized Treatment Plan.    Treatment Goals:     see above     Area of Treatment Focus:  Symptom Management and Develop Socialization / Interpersonal Relationship Skills    Therapeutic Interventions/Treatment Strategies:  Support, Feedback, Structured Activity, Clarification and Education    Response to Treatment Strategies:  Accepted Feedback, Gave Feedback, Listened, Focused on Goals, Attentive, Accepted Support and Alert    Name of Group:  Mental health management Wellness    Description and Outcome:    Facilitated 7 chair yoga poses with attention on calming the nervous system by using breath and gentle stretch. Then led progressive relaxation with focus on letting go of thinking, finally led guided meditation. Katerina noted that she noticed that she would get caught up in ruminative thinking but was able to use the imagery of \" clouds drifting through the koko\", as a way of bringing her attention back to the present. She reported she felt she could use this to help reduce her rumination and anxiety.         Is this a Weekly Review of the Progress on the Treatment Plan?  No  " nausea and dizziness

## 2023-10-25 NOTE — PROGRESS NOTES
"  Adult Mental Health Outpatient Group Therapy Progress Note     Client Initial Individualized Goals for Treatment:Katerina verbalizes the following treatment/discharge goals: \"To decrease the fear of each day. Increase my self confidence again. To be able to face the day. Decrease anxiety symptoms\".      See Initial Treatment suggestions for the client during the time between Diagnostic Assessment and completion of the Master Individualized Treatment Plan.    Treatment Goals:  . Will plan and problem-solve to return to previous socia, enjoyable activities and decrease lonliness and isolation  Will plan and problem-solve to decrease fears and anxiety and increase self confidence  Client will notify staff when needing assistance to develop or implement a coping plan to manage suicidal or self injurious urges.       Area of Treatment Focus:  Symptom Management and Develop Socialization / Interpersonal Relationship Skills    Therapeutic Interventions/Treatment Strategies:  Support, Feedback, Structured Activity, Clarification and Education    Response to Treatment Strategies:  Accepted Feedback, Gave Feedback, Listened, Focused on Goals, Attentive, Accepted Support and Alert    Name of Group:  Mental health management     Description and Outcome:  The group was provided with a guided breathing and warmup structure with focus on increasing self awareness and on providing an embodied experience.  Discussion included the importance of listening to body cues as a way of identifying emotion as well as accompanying needs and wants, and as a way of practising self compassion, authenticity, mindfulness, self expression,  and connection to other.  Group members were encouraged to explore, on their own, by listening to body cues, what required attention today.  They were then encouraged to develop a movement and share this with the group.  The group ended compiling member's movement and moving forward toward each other. Katerina " Your COVID/influenza swab is negative, and there is no evidence of pneumonia noted on your chest x-ray.  Suspect underlying illness not tested for today or since your symptoms started last night it may be too soon to show up positive on the test used today.  Continue to monitor your symptoms and treat accordingly with over-the-counter medications.  Follow-up with your primary provider for reevaluation.  Return to the ER for any new or worsening symptoms.   shared her movement phrase, which was accepting self love.  Katerina accepted observation about the fullness of her breath when she engaged  In this movement.  She made progress toward goal of increasing self confidence.    Is this a Weekly Review of the Progress on the Treatment Plan?  No
